# Patient Record
Sex: MALE | Race: WHITE | NOT HISPANIC OR LATINO | Employment: FULL TIME | URBAN - METROPOLITAN AREA
[De-identification: names, ages, dates, MRNs, and addresses within clinical notes are randomized per-mention and may not be internally consistent; named-entity substitution may affect disease eponyms.]

---

## 2017-01-09 ENCOUNTER — TELEPHONE (OUTPATIENT)
Dept: TRANSPLANT | Facility: CLINIC | Age: 22
End: 2017-01-09

## 2017-01-09 NOTE — TELEPHONE ENCOUNTER
Left voicemail for Hussain regarding his appointment scheduled tomorrow 1/10/17 with Dr. Pearson.  Just called to confirm he was able to make it to the appointment.  Asked him to give me a call back.

## 2017-01-25 DIAGNOSIS — Z94.0 KIDNEY REPLACED BY TRANSPLANT: ICD-10-CM

## 2017-01-25 PROCEDURE — 80158 DRUG ASSAY CYCLOSPORINE: CPT | Performed by: PEDIATRICS

## 2017-01-26 LAB
CYCLOSPORINE BLD LC/MS/MS-MCNC: 84 UG/L (ref 50–400)
TME LAST DOSE: NORMAL H

## 2017-02-24 DIAGNOSIS — Z94.0 KIDNEY REPLACED BY TRANSPLANT: ICD-10-CM

## 2017-02-24 PROCEDURE — 80158 DRUG ASSAY CYCLOSPORINE: CPT | Performed by: PEDIATRICS

## 2017-02-25 LAB
CYCLOSPORINE BLD LC/MS/MS-MCNC: 86 UG/L (ref 50–400)
TME LAST DOSE: NORMAL H

## 2017-03-08 ENCOUNTER — OFFICE VISIT (OUTPATIENT)
Dept: NEPHROLOGY | Facility: CLINIC | Age: 22
End: 2017-03-08
Attending: PEDIATRICS
Payer: COMMERCIAL

## 2017-03-08 VITALS
BODY MASS INDEX: 32.88 KG/M2 | DIASTOLIC BLOOD PRESSURE: 67 MMHG | HEIGHT: 74 IN | HEART RATE: 74 BPM | WEIGHT: 256.17 LBS | SYSTOLIC BLOOD PRESSURE: 136 MMHG | TEMPERATURE: 98.9 F

## 2017-03-08 DIAGNOSIS — D84.9 IMMUNOSUPPRESSION (H): ICD-10-CM

## 2017-03-08 DIAGNOSIS — Z94.0 KIDNEY TRANSPLANT STATUS: Primary | ICD-10-CM

## 2017-03-08 DIAGNOSIS — I15.1 HYPERTENSION SECONDARY TO OTHER RENAL DISORDERS: ICD-10-CM

## 2017-03-08 DIAGNOSIS — Z94.0 KIDNEY REPLACED BY TRANSPLANT: ICD-10-CM

## 2017-03-08 DIAGNOSIS — N18.30 CKD (CHRONIC KIDNEY DISEASE) STAGE 3, GFR 30-59 ML/MIN (H): ICD-10-CM

## 2017-03-08 PROCEDURE — 99212 OFFICE O/P EST SF 10 MIN: CPT | Mod: ZF

## 2017-03-08 PROCEDURE — 90651 9VHPV VACCINE 2/3 DOSE IM: CPT | Mod: ZF

## 2017-03-08 PROCEDURE — 25000128 H RX IP 250 OP 636: Mod: ZF

## 2017-03-08 PROCEDURE — 90472 IMMUNIZATION ADMIN EACH ADD: CPT

## 2017-03-08 PROCEDURE — 90471 IMMUNIZATION ADMIN: CPT

## 2017-03-08 PROCEDURE — 90732 PPSV23 VACC 2 YRS+ SUBQ/IM: CPT | Mod: ZF

## 2017-03-08 PROCEDURE — 90620 MENB-4C VACCINE IM: CPT | Mod: ZF

## 2017-03-08 PROCEDURE — 25000125 ZZHC RX 250: Mod: ZF

## 2017-03-08 PROCEDURE — G0009 ADMIN PNEUMOCOCCAL VACCINE: HCPCS

## 2017-03-08 RX ORDER — MYCOPHENOLATE MOFETIL 250 MG/1
1000 CAPSULE ORAL EVERY 12 HOURS
Qty: 240 CAPSULE | Refills: 6 | Status: SHIPPED | OUTPATIENT
Start: 2017-03-08 | End: 2017-10-31

## 2017-03-08 ASSESSMENT — PAIN SCALES - GENERAL: PAINLEVEL: NO PAIN (0)

## 2017-03-08 NOTE — PROGRESS NOTES
Return Visit for kidney transplant, hypertension, CKD3, immunosuppression    Chief Complaint:  Chief Complaint   Patient presents with     RECHECK     kidney txp follow up        HPI:    I had the pleasure of seeing Hussain Steven in the Pediatric Nephrology Clinic today for follow-up of kidney transplant, hypertension, CKD3, immunosuppression. Hussain is a 21 year old male who was transplanted in 2009.    Hussain had no complaints today.  He has been well recently without fevers, severe headaches, changes in vision, sore throat, difficulty swallowing, persistent cough, difficulty breathing, chest pain, abdominal pain, vomiting, diarrhea, dysuria, extremity pain or swelling, rash or skin lesions.  He is taking his medications as prescribed.    Hussain will be graduating this spring from [employer].  He hopes to get a job in [employer] in the [place] area.    Review of Systems:  A comprehensive review of systems was performed and found to be negative other than noted in the HPI.    Allergies:  Hussain is allergic to lisinopril..    Active Medications:  Current Outpatient Prescriptions   Medication Sig Dispense Refill     mycophenolate (CELLCEPT - GENERIC EQUIVALENT) 250 MG capsule Take 4 capsules (1,000 mg) by mouth every 12 hours 240 capsule 6     losartan (COZAAR) 50 MG tablet Take 1 tablet (50 mg) by mouth daily 30 tablet 11     atenolol (TENORMIN) 50 MG tablet Take 1 tablet (50 mg) by mouth two times daily 60 tablet 11     sulfamethoxazole-trimethoprim (BACTRIM,SEPTRA) 400-80 MG per tablet Take 1 tablet by mouth twice a week Twice a week, decreased for low WBC 10 tablet 11     GENGRAF 25 MG PO CAPSULE Take 4 capsules (100 mg) by mouth every 12 hours Dose change, family aware.  Please profile. 240 capsule 11     [DISCONTINUED] mycophenolate (CELLCEPT - GENERIC EQUIVALENT) 250 MG capsule Take 4 capsules (1,000 mg) by mouth every 12 hours 240 capsule 11        Immunizations:  Immunization History   Administered Date(s) Administered     DTAP  (<7y) 1995, 1995, 01/09/1996, 10/08/1996, 07/13/2000     HIB 1995, 1995, 01/09/1996, 10/08/1996     Hepatitis A Vac Ped/Adol-2 Dose 01/19/2009, 09/20/2010     Hepatitis B 1995, 1995, 01/09/1996, 01/19/2009, 07/02/2009     Human Papilloma Virus 08/26/2013, 12/26/2013, 03/08/2017     IPV 07/13/2000     Influenza (H1N1) 10/26/2009     Influenza (IIV3) 09/28/2009, 09/20/2010, 10/01/2011, 10/10/2012, 10/15/2013     Influenza Vaccine IM 3yrs+ 4 Valent IIV4 10/14/2014, 10/13/2015, 10/12/2016     MMR 07/09/1996, 07/13/2000     Mantoux 01/07/2009     Meningococcal (Bexsero ) 03/08/2017     Meningococcal (Menactra ) 05/21/2007, 08/26/2013     OPV 1995, 1995, 01/09/1996     Pneumococcal (PCV 13) 12/26/2013     Pneumococcal 23 valent 01/19/2009, 03/08/2017     Tdap (Adacel,Boostrix) 05/21/2007     Tetramune (DtP/HIB) 1995, 1995, 01/09/1996, 10/08/1996     Varicella 07/11/1997, 05/21/2007        PMHx:  Past Medical History   Diagnosis Date     Congenital hydrocele      rt side     Hyperplasia of gingivae      from amlodipine s/p gingeval resection     Kidney transplant Feb 2009     Unspecified hypertensive kidney disease      VUR (vesicoureteric reflux)      in native kidney, removed 2/09         PSHx:    Past Surgical History   Procedure Laterality Date     Transplant kidney recipient living related  2/2009     Biopsy kidney  4/2009     Biopsy kidney  8/2011     Orchiopexy infant       L with hernia at 18 mo     Orthopedic surgery  8/2009     Genu Valgum plating, removed 4/2010       FHx:  Family History   Problem Relation Age of Onset     Hypertension Father      DIABETES Paternal Uncle      Type 2     DIABETES Paternal Grandmother      Type 2     Hypertension Maternal Grandmother      Hypertension Maternal Grandfather      Hypertension Paternal Grandmother      Hypertension Paternal Grandfather      CANCER Paternal Grandfather      Lung     Cancer - colorectal  "Paternal Grandfather      Lyphoma, also with IP     Breast Cancer Maternal Grandmother        SHx:  Social History   Substance Use Topics     Smoking status: Never Smoker     Smokeless tobacco: Not on file     Alcohol use Not on file     Social History     Social History Narrative       Physical Exam:    /67 (BP Location: Right arm, Patient Position: Chair, Cuff Size: Adult Large)  Pulse 74  Temp 98.9  F (37.2  C) (Oral)  Ht 6' 2.29\" (188.7 cm)  Wt 256 lb 2.8 oz (116.2 kg)  BMI 32.63 kg/m2  Exam:  Constitutional: healthy, alert and no distress  Head: Normocephalic. No masses, lesions, tenderness or abnormalities  Neck: Neck supple. No adenopathy. Thyroid symmetric, normal size,  EYE: no periorbital edema  ENT: ENT exam normal, no neck nodes or sinus tenderness  Cardiovascular: negative, PMI normal. No lifts, heaves, or thrills. RRR. No murmurs, clicks gallops or rub  Respiratory: negative, Percussion normal. Good diaphragmatic excursion. Lungs clear  Gastrointestinal: Abdomen soft, non-tender. BS normal. No masses, organomegaly  : Deferred  Musculoskeletal: extremities normal- no gross deformities noted, gait normal, normal muscle tone and no  ankle edema  Skin: no suspicious lesions or rashes  Neurologic: Gait normal.   Psychiatric: mentation appears normal and affect normal/bright  Hematologic/Lymphatic/Immunologic: normal ant/post cervical, axillary, supraclavicular and inguinal nodes    Labs and Imaging:  Results for orders placed or performed in visit on 03/01/17   TXP External Lab Result   Result Value Ref Range    Glucose (External) 95 65 - 99 mg/dL    Urea Nitrogen (External) 29 (H) 7 - 25 mg/dL    Creatinine (External) 1.77 (H) 0.60 - 1.35 mg/dL    GFR Estimated (External) 54 (L) >=60 ml/min/1.73 m2    GFR Est if Black (External) 62 >=60 ml/min/1.73 m2    BUN/Creatinine Ratio (External) 16 6 - 22    Sodium (External) 139 135 - 146 mmol/L    Potassium (External) 4.4 3.5 - 5.3 mmol/L    Chloride " (External) 107 98 - 110 mmol/L    CO2 (External) 23 20 - 31 mmol/L    Calcium (External) 10.4 (H) 8.6 - 10.3 mg/dL    Phosphorus (External) 4.0 2.5 - 4.5 mg/dL    Albumin (External) 4.5 3.6 - 5.1 g/dL    Magnesium (External) 1.9 1.5 - 2.5 mg/dL    WBC Count (External) 7.2 4.1 - 11.0 K/uL    RBC Count (External) 4.99 3.60 - 6.30 M/uL    Hemoglobin (External) 14.1 12.0 - 18.0 g/dL    Hematocrit (External) 41.5 35.0 - 54.0 %    MCV (External) 83 80 - 99 fL    MCH (External) 28.3 26.0 - 32.0 pg    MCHC (External) 34.0 31.0 - 36.0 g/dL    RDW (External) 12.1 11.5 - 14.5 %    Platelet Count (External) 312 140 - 440 K/uL    MPV (External) 6.8 0.0 - 40.0 FL    Narrative    Verified by Radha Amato on 03/01/2017.       I personally reviewed results of laboratory evaluation, imaging studies and past medical records that were available during this outpatient visit.      Assessment and Plan:      ICD-10-CM    1. Kidney transplant status Z94.0 HUMAN PAPILLOMAVIRUS VACCINE     PNEUMOCOCCAL VACCINE,ADULT,SQ OR IM (PPSV23)     MENINGOCOCCAL RP W/OMV VACCINE 2 DOSE IM (BEXSERO )   2. Renal transplant recipient Z94.0 mycophenolate (CELLCEPT - GENERIC EQUIVALENT) 250 MG capsule   3. Hypertension secondary to other renal disorders I15.1 mycophenolate (CELLCEPT - GENERIC EQUIVALENT) 250 MG capsule    N28.89    4. CKD (chronic kidney disease) stage 3, GFR 30-59 ml/min N18.3    5. Immunosuppression (H) D89.9        Hussain is 8 years post renal transplant with stable CKD3.  He has hypertension that is well controlled on his current regimen.     I made no medication changes today.  We gave Hussain his 3rd HPV, PPV23 and his first Bexsera.    Our plan is to transition Hussain to the adult transplant program, since he is 21 and responsibly managing his care.  We will help him identify a nephrologist in the [place] area to manage his hypertension and CKD, and a transplant physician at the [facility].  I will remain his transplant  physician until he has established care with my counterpart in the adult program.    Patient Education: During this visit I discussed in detail the patient s symptoms, physical exam and evaluation results findings, tentative diagnosis as well as the treatment plan (Including but not limited to possible side effects and complications related to the disease, treatment modalities and intervention(s). Family expressed understanding and consent. Family was receptive and ready to learn; no apparent learning barriers were identified.    Follow up: Return in about 6 months (around 9/8/2017) for With Transplant. Please return sooner should Hussain become symptomatic.          Sincerely,    Mando Pearson MD   Pediatric Nephrology    CC:   Patient Care Team:  Mary Giles NP as PCP - General  Lizabeth Ohara, RN as Registered Nurse (Pediatrics)  Mando Pearson MD as MD (Pediatrics)  INDIANA ANDREA MD    Copy to patient  Lindsey Steven [facility] [id number]

## 2017-03-08 NOTE — LETTER
3/8/2017      RE: Hussain Steven  [address] [address]       Return Visit for kidney transplant, hypertension, CKD3, immunosuppression    Chief Complaint:  Chief Complaint   Patient presents with     RECHECK     kidney txp follow up        HPI:    I had the pleasure of seeing Hussain Steven in the Pediatric Nephrology Clinic today for follow-up of kidney transplant, hypertension, CKD3, immunosuppression. Hussain is a 21 year old male who was transplanted in 2009.    Hussain had no complaints today.  He has been well recently without fevers, severe headaches, changes in vision, sore throat, difficulty swallowing, persistent cough, difficulty breathing, chest pain, abdominal pain, vomiting, diarrhea, dysuria, extremity pain or swelling, rash or skin lesions.  He is taking his medications as prescribed.    Hussain will be graduating this spring from [employer].  He hopes to get a job in [employer] in the [place] area.    Review of Systems:  A comprehensive review of systems was performed and found to be negative other than noted in the HPI.    Allergies:  Hussain is allergic to lisinopril..    Active Medications:  Current Outpatient Prescriptions   Medication Sig Dispense Refill     mycophenolate (CELLCEPT - GENERIC EQUIVALENT) 250 MG capsule Take 4 capsules (1,000 mg) by mouth every 12 hours 240 capsule 6     losartan (COZAAR) 50 MG tablet Take 1 tablet (50 mg) by mouth daily 30 tablet 11     atenolol (TENORMIN) 50 MG tablet Take 1 tablet (50 mg) by mouth two times daily 60 tablet 11     sulfamethoxazole-trimethoprim (BACTRIM,SEPTRA) 400-80 MG per tablet Take 1 tablet by mouth twice a week Twice a week, decreased for low WBC 10 tablet 11     GENGRAF 25 MG PO CAPSULE Take 4 capsules (100 mg) by mouth every 12 hours Dose change, family aware.  Please profile. 240 capsule 11     [DISCONTINUED] mycophenolate (CELLCEPT - GENERIC EQUIVALENT) 250 MG capsule Take 4 capsules (1,000 mg) by mouth every 12 hours 240 capsule 11         Immunizations:  Immunization History   Administered Date(s) Administered     DTAP (<7y) 1995, 1995, 01/09/1996, 10/08/1996, 07/13/2000     HIB 1995, 1995, 01/09/1996, 10/08/1996     Hepatitis A Vac Ped/Adol-2 Dose 01/19/2009, 09/20/2010     Hepatitis B 1995, 1995, 01/09/1996, 01/19/2009, 07/02/2009     Human Papilloma Virus 08/26/2013, 12/26/2013, 03/08/2017     IPV 07/13/2000     Influenza (H1N1) 10/26/2009     Influenza (IIV3) 09/28/2009, 09/20/2010, 10/01/2011, 10/10/2012, 10/15/2013     Influenza Vaccine IM 3yrs+ 4 Valent IIV4 10/14/2014, 10/13/2015, 10/12/2016     MMR 07/09/1996, 07/13/2000     Mantoux 01/07/2009     Meningococcal (Bexsero ) 03/08/2017     Meningococcal (Menactra ) 05/21/2007, 08/26/2013     OPV 1995, 1995, 01/09/1996     Pneumococcal (PCV 13) 12/26/2013     Pneumococcal 23 valent 01/19/2009, 03/08/2017     Tdap (Adacel,Boostrix) 05/21/2007     Tetramune (DtP/HIB) 1995, 1995, 01/09/1996, 10/08/1996     Varicella 07/11/1997, 05/21/2007        PMHx:  Past Medical History   Diagnosis Date     Congenital hydrocele      rt side     Hyperplasia of gingivae      from amlodipine s/p gingeval resection     Kidney transplant Feb 2009     Unspecified hypertensive kidney disease      VUR (vesicoureteric reflux)      in native kidney, removed 2/09         PSHx:    Past Surgical History   Procedure Laterality Date     Transplant kidney recipient living related  2/2009     Biopsy kidney  4/2009     Biopsy kidney  8/2011     Orchiopexy infant       L with hernia at 18 mo     Orthopedic surgery  8/2009     Genu Valgum plating, removed 4/2010       FHx:  Family History   Problem Relation Age of Onset     Hypertension Father      DIABETES Paternal Uncle      Type 2     DIABETES Paternal Grandmother      Type 2     Hypertension Maternal Grandmother      Hypertension Maternal Grandfather      Hypertension Paternal Grandmother      Hypertension Paternal  "Grandfather      CANCER Paternal Grandfather      Lung     Cancer - colorectal Paternal Grandfather      Lyphoma, also with IP     Breast Cancer Maternal Grandmother        SHx:  Social History   Substance Use Topics     Smoking status: Never Smoker     Smokeless tobacco: Not on file     Alcohol use Not on file     Social History     Social History Narrative       Physical Exam:    /67 (BP Location: Right arm, Patient Position: Chair, Cuff Size: Adult Large)  Pulse 74  Temp 98.9  F (37.2  C) (Oral)  Ht 6' 2.29\" (188.7 cm)  Wt 256 lb 2.8 oz (116.2 kg)  BMI 32.63 kg/m2  Exam:  Constitutional: healthy, alert and no distress  Head: Normocephalic. No masses, lesions, tenderness or abnormalities  Neck: Neck supple. No adenopathy. Thyroid symmetric, normal size,  EYE: no periorbital edema  ENT: ENT exam normal, no neck nodes or sinus tenderness  Cardiovascular: negative, PMI normal. No lifts, heaves, or thrills. RRR. No murmurs, clicks gallops or rub  Respiratory: negative, Percussion normal. Good diaphragmatic excursion. Lungs clear  Gastrointestinal: Abdomen soft, non-tender. BS normal. No masses, organomegaly  : Deferred  Musculoskeletal: extremities normal- no gross deformities noted, gait normal, normal muscle tone and no  ankle edema  Skin: no suspicious lesions or rashes  Neurologic: Gait normal.   Psychiatric: mentation appears normal and affect normal/bright  Hematologic/Lymphatic/Immunologic: normal ant/post cervical, axillary, supraclavicular and inguinal nodes    Labs and Imaging:  Results for orders placed or performed in visit on 03/01/17   TXP External Lab Result   Result Value Ref Range    Glucose (External) 95 65 - 99 mg/dL    Urea Nitrogen (External) 29 (H) 7 - 25 mg/dL    Creatinine (External) 1.77 (H) 0.60 - 1.35 mg/dL    GFR Estimated (External) 54 (L) >=60 ml/min/1.73 m2    GFR Est if Black (External) 62 >=60 ml/min/1.73 m2    BUN/Creatinine Ratio (External) 16 6 - 22    Sodium (External) 139 " 135 - 146 mmol/L    Potassium (External) 4.4 3.5 - 5.3 mmol/L    Chloride (External) 107 98 - 110 mmol/L    CO2 (External) 23 20 - 31 mmol/L    Calcium (External) 10.4 (H) 8.6 - 10.3 mg/dL    Phosphorus (External) 4.0 2.5 - 4.5 mg/dL    Albumin (External) 4.5 3.6 - 5.1 g/dL    Magnesium (External) 1.9 1.5 - 2.5 mg/dL    WBC Count (External) 7.2 4.1 - 11.0 K/uL    RBC Count (External) 4.99 3.60 - 6.30 M/uL    Hemoglobin (External) 14.1 12.0 - 18.0 g/dL    Hematocrit (External) 41.5 35.0 - 54.0 %    MCV (External) 83 80 - 99 fL    MCH (External) 28.3 26.0 - 32.0 pg    MCHC (External) 34.0 31.0 - 36.0 g/dL    RDW (External) 12.1 11.5 - 14.5 %    Platelet Count (External) 312 140 - 440 K/uL    MPV (External) 6.8 0.0 - 40.0 FL    Narrative    Verified by Radha Amato on 03/01/2017.       I personally reviewed results of laboratory evaluation, imaging studies and past medical records that were available during this outpatient visit.      Assessment and Plan:      ICD-10-CM    1. Kidney transplant status Z94.0 HUMAN PAPILLOMAVIRUS VACCINE     PNEUMOCOCCAL VACCINE,ADULT,SQ OR IM (PPSV23)     MENINGOCOCCAL RP W/OMV VACCINE 2 DOSE IM (BEXSERO )   2. Renal transplant recipient Z94.0 mycophenolate (CELLCEPT - GENERIC EQUIVALENT) 250 MG capsule   3. Hypertension secondary to other renal disorders I15.1 mycophenolate (CELLCEPT - GENERIC EQUIVALENT) 250 MG capsule    N28.89    4. CKD (chronic kidney disease) stage 3, GFR 30-59 ml/min N18.3    5. Immunosuppression (H) D89.9        Hussain is 8 years post renal transplant with stable CKD3.  He has hypertension that is well controlled on his current regimen.     I made no medication changes today.  We gave Hussain his 3rd HPV, PPV23 and his first Bexsera.    Our plan is to transition Hussain to the adult transplant program, since he is 21 and responsibly managing his care.  We will help him identify a nephrologist in the [place] area to manage his hypertension and CKD, and a transplant  physician at the [facility].  I will remain his transplant physician until he has established care with my counterpart in the adult program.    Patient Education: During this visit I discussed in detail the patient s symptoms, physical exam and evaluation results findings, tentative diagnosis as well as the treatment plan (Including but not limited to possible side effects and complications related to the disease, treatment modalities and intervention(s). Family expressed understanding and consent. Family was receptive and ready to learn; no apparent learning barriers were identified.    Follow up: Return in about 6 months (around 9/8/2017) for With Transplant. Please return sooner should Hussain become symptomatic.          Sincerely,    Mando Pearson MD   Pediatric Nephrology    CC:   Patient Care Team:  Mary Giles NP as PCP - General  Lizabeth Ohara, RN as Registered Nurse (Pediatrics)  INDIANA ANDREA MD    Copy to patient  Lindsey Steven   [address] [place] [id number]

## 2017-03-08 NOTE — PATIENT INSTRUCTIONS
Follow up with Primary care physician in [place]. Call Lizabeth with name and phone number if this is a physician you want to stay with, [phone].  Follow up with Transplant in 6 months.  Monthly Labs.

## 2017-03-08 NOTE — NURSING NOTE
"Chief Complaint   Patient presents with     RECHECK     kidney txp follow up        Initial /67 (BP Location: Right arm, Patient Position: Chair, Cuff Size: Adult Large)  Pulse 74  Temp 98.9  F (37.2  C) (Oral)  Ht 6' 2.29\" (188.7 cm)  Wt 256 lb 2.8 oz (116.2 kg)  BMI 32.63 kg/m2 Estimated body mass index is 32.63 kg/(m^2) as calculated from the following:    Height as of this encounter: 6' 2.29\" (188.7 cm).    Weight as of this encounter: 256 lb 2.8 oz (116.2 kg).  Medication Reconciliation: complete   Beatriz Do LPN      "

## 2017-03-08 NOTE — NURSING NOTE
Medications reviewed with Hussain.  Lab frequency discuss, Hussain expressed understanding of our recommendations.  Hussain Steven uses [facility] lab.  Orders are up to date.  Print out of current med list provided.  Hussain verbalized understanding of the clinic visit and plan of care.  Hussain verbalized understanding of upcoming tests and appointments.  Hussain is going to graduate from college in May. He is looking for a job in [place]. He is ready for transition.

## 2017-03-08 NOTE — LETTER
March 8, 2017        Hussain Steven  [address]  [place] [address]    Dear Hussain:    We have reviewed your immunization records.     We are recommending that you complete the following immunizations in the near future:    [x]  Bexero Mengicoccal B vaccine Due after April 8th, 2017. First dose given March 8, 2017.     Please note you SHOULD NOT receive any LIVE vaccines due to post transplant/immunocompromised status.  These vaccines include, but are not limited to MMR, Varicella and Flu Mist.    Please schedule an appointment with your primary care provider or talk to your transplant coordinator about getting immunizations updated at the [facility]    If you receive the immunizations at your primary care office, please have the updated record faxed to Cecy Chapman or Melody Kyle at [phone].    Sincerely,    Your Transplant Care Team    Shara Nation, LIZET Ohara, VERONICA Ryan, VERONICA Kyle, [facility]  Cecy Chapman, [facility]

## 2017-03-08 NOTE — MR AVS SNAPSHOT
After Visit Summary   3/8/2017    Hussain Steven    MRN: [id number]           Patient Information     Date Of Birth          1995        Visit Information        Provider Department      3/8/2017 1:00 PM Mando Pearson MD Peds Nephrology        Today's Diagnoses     Kidney transplant status    -  1    Renal transplant recipient        Hypertension secondary to other renal disorders          Care Instructions    Follow up with Primary care physician in [place]. Call Lizabeth with name and phone number if this is a physician you want to stay with, [phone].  Follow up with Transplant in 6 months.  Monthly Labs.        Follow-ups after your visit        Follow-up notes from your care team     Return in about 6 months (around 9/8/2017) for With Transplant.      Who to contact     Please call your clinic at [phone] to:    Ask questions about your health    Make or cancel appointments    Discuss your medicines    Learn about your test results    Speak to your doctor   If you have compliments or concerns about an experience at your clinic, or if you wish to file a complaint, please contact [facility] Patient Relations at [phone] or email us at [email or web address]         Additional Information About Your Visit        MyChart Information     [employer] gives you secure access to your electronic health record. If you see a primary care provider, you can also send messages to your care team and make appointments. If you have questions, please call your primary care clinic.  If you do not have a primary care provider, please call [phone] and they will assist you.      [employer] is an electronic gateway that provides easy, online access to your medical records. With [employer], you can request a clinic appointment, read your test results, renew a prescription or communicate with your care team.     To access your existing account, please contact your  "[facility] or call [phone] for assistance.        Care EveryWhere ID     This is your Care EveryWhere ID. This could be used by other organizations to access your [place] medical records  [id number]        Your Vitals Were     Pulse Temperature Height BMI (Body Mass Index)          74 98.9  F (37.2  C) (Oral) 6' 2.29\" (188.7 cm) 32.63 kg/m2         Blood Pressure from Last 3 Encounters:   03/08/17 136/67   07/12/16 135/57   04/03/16 122/65    Weight from Last 3 Encounters:   03/08/17 256 lb 2.8 oz (116.2 kg)   07/12/16 255 lb 15.3 oz (116.1 kg)   01/12/16 254 lb 3.1 oz (115.3 kg)              We Performed the Following     HUMAN PAPILLOMAVIRUS VACCINE     MENINGOCOCCAL RP W/OMV VACCINE 2 DOSE IM (BEXSERO )     PNEUMOCOCCAL VACCINE,ADULT,SQ OR IM (PPSV23)          Where to get your medicines      These medications were sent to [facility] [address] [place] [address] AT [facility] & [address] [address]     Phone:  [phone]     mycophenolate 250 MG capsule          Primary Care Provider Office Phone # Fax #    Mary Giles [phone]       [address] [address]        Thank you!     Thank you for choosing PEDS NEPHROLOGY  for your care. Our goal is always to provide you with excellent care. Hearing back from our patients is one way we can continue to improve our services. Please take a few minutes to complete the written survey that you may receive in the mail after your visit with us. Thank you!             Your Updated Medication List - Protect others around you: Learn how to safely use, store and throw away your medicines at www.disposemymeds.org.          This list is accurate as of: 3/8/17  1:56 PM.  Always use your most recent med list.                   Brand Name Dispense Instructions for use    atenolol 50 MG tablet    TENORMIN    60 tablet    Take 1 tablet (50 " mg) by mouth two times daily       cycloSPORINE modified capsule     240 capsule    Take 4 capsules (100 mg) by mouth every 12 hours Dose change, family aware.  Please profile.       losartan 50 MG tablet    COZAAR    30 tablet    Take 1 tablet (50 mg) by mouth daily       mycophenolate 250 MG capsule    CELLCEPT - GENERIC EQUIVALENT    240 capsule    Take 4 capsules (1,000 mg) by mouth every 12 hours       sulfamethoxazole-trimethoprim 400-80 MG per tablet    BACTRIM/SEPTRA    10 tablet    Take 1 tablet by mouth twice a week Twice a week, decreased for low WBC

## 2017-03-24 DIAGNOSIS — Z94.0 KIDNEY REPLACED BY TRANSPLANT: ICD-10-CM

## 2017-03-24 PROCEDURE — 80158 DRUG ASSAY CYCLOSPORINE: CPT | Performed by: PEDIATRICS

## 2017-03-27 LAB
CYCLOSPORINE BLD LC/MS/MS-MCNC: 78 UG/L (ref 50–400)
TME LAST DOSE: NORMAL H

## 2017-04-28 DIAGNOSIS — Z94.0 KIDNEY REPLACED BY TRANSPLANT: ICD-10-CM

## 2017-04-28 PROCEDURE — 80158 DRUG ASSAY CYCLOSPORINE: CPT | Performed by: PEDIATRICS

## 2017-04-30 LAB
CYCLOSPORINE BLD LC/MS/MS-MCNC: 88 UG/L (ref 50–400)
TME LAST DOSE: NORMAL H

## 2017-05-26 DIAGNOSIS — Z94.0 KIDNEY REPLACED BY TRANSPLANT: ICD-10-CM

## 2017-05-26 PROCEDURE — 80158 DRUG ASSAY CYCLOSPORINE: CPT | Performed by: PEDIATRICS

## 2017-05-31 ENCOUNTER — TELEPHONE (OUTPATIENT)
Dept: TRANSPLANT | Facility: CLINIC | Age: 22
End: 2017-05-31

## 2017-05-31 LAB
CYCLOSPORINE BLD LC/MS/MS-MCNC: 45 UG/L (ref 50–400)
TME LAST DOSE: ABNORMAL H

## 2017-05-31 NOTE — TELEPHONE ENCOUNTER
Called Hussain with cyclosporine dose adjustment due to lab result of 45.  Hussain confirm the doses were taken accurately but states that the labs were taken two hours late.  Hussain confirmed current dose is 100mg every 12 hours.  Informed him we will not make a dose adjustment at this time but would like to repeat a level next week.  Hussain verbalized understanding of this information and confirmed he uses the [facility] lab in [place].  A lab order will be faxed over for next week (June 5-9).

## 2017-06-06 DIAGNOSIS — Z94.0 KIDNEY REPLACED BY TRANSPLANT: ICD-10-CM

## 2017-06-06 PROCEDURE — 80158 DRUG ASSAY CYCLOSPORINE: CPT | Performed by: PEDIATRICS

## 2017-06-08 LAB
CYCLOSPORINE BLD LC/MS/MS-MCNC: 67 UG/L (ref 50–400)
TME LAST DOSE: NORMAL H

## 2017-07-11 DIAGNOSIS — Z94.0 KIDNEY REPLACED BY TRANSPLANT: ICD-10-CM

## 2017-07-11 PROCEDURE — 80158 DRUG ASSAY CYCLOSPORINE: CPT | Performed by: PEDIATRICS

## 2017-07-13 LAB
CYCLOSPORINE BLD LC/MS/MS-MCNC: 67 UG/L (ref 50–400)
TME LAST DOSE: NORMAL H

## 2017-07-25 DIAGNOSIS — I15.1 HYPERTENSION SECONDARY TO OTHER RENAL DISORDERS: ICD-10-CM

## 2017-07-25 DIAGNOSIS — Z94.0 KIDNEY REPLACED BY TRANSPLANT: ICD-10-CM

## 2017-07-25 RX ORDER — ATENOLOL 50 MG/1
50 TABLET ORAL
Qty: 60 TABLET | Refills: 11 | Status: SHIPPED | OUTPATIENT
Start: 2017-07-25 | End: 2017-07-26

## 2017-07-26 ENCOUNTER — TELEPHONE (OUTPATIENT)
Dept: TRANSPLANT | Facility: CLINIC | Age: 22
End: 2017-07-26

## 2017-07-26 DIAGNOSIS — Z94.0 KIDNEY REPLACED BY TRANSPLANT: ICD-10-CM

## 2017-07-26 DIAGNOSIS — I15.1 HYPERTENSION SECONDARY TO OTHER RENAL DISORDERS: ICD-10-CM

## 2017-07-26 RX ORDER — ATENOLOL 50 MG/1
50 TABLET ORAL
Qty: 60 TABLET | Refills: 11 | Status: SHIPPED | OUTPATIENT
Start: 2017-07-26 | End: 2017-07-26

## 2017-07-26 RX ORDER — METOPROLOL TARTRATE 100 MG
100 TABLET ORAL 2 TIMES DAILY
Qty: 60 TABLET | Refills: 1 | Status: SHIPPED | OUTPATIENT
Start: 2017-07-26

## 2017-07-26 RX ORDER — ATENOLOL 50 MG/1
50 TABLET ORAL
Qty: 60 TABLET | Refills: 11 | Status: SHIPPED | OUTPATIENT
Start: 2017-07-26 | End: 2017-07-28

## 2017-07-26 NOTE — TELEPHONE ENCOUNTER
Catracho Nixon,  Hope you re having a good summer. Just thinking about you and wondering what your plans are for transitioning to Adult care.  Are you staying in [place]?  If so I can connect you with a Nephrologist there and transition you to our Adult Transplant Providers.    Let me know when you get a chance.    Thanks,    Lizabeth Ohara, RN BSN CPN  Pediatric Liver and Kidney Post Transplant Coordinator     [facility]  Pediatric Solid Organ Transplant -[address]  [facility] [place] [address]  [email or web address]  Office: [phone]  Main Transplant Center [phone]  Pager: [phone]  Fax:

## 2017-07-28 DIAGNOSIS — I15.1 HYPERTENSION SECONDARY TO OTHER RENAL DISORDERS: ICD-10-CM

## 2017-07-28 DIAGNOSIS — Z94.0 KIDNEY REPLACED BY TRANSPLANT: ICD-10-CM

## 2017-08-01 ENCOUNTER — OFFICE VISIT (OUTPATIENT)
Dept: NEPHROLOGY | Facility: CLINIC | Age: 22
End: 2017-08-01
Attending: INTERNAL MEDICINE
Payer: COMMERCIAL

## 2017-08-01 ENCOUNTER — APPOINTMENT (OUTPATIENT)
Dept: TRANSPLANT | Facility: CLINIC | Age: 22
End: 2017-08-01
Attending: INTERNAL MEDICINE
Payer: COMMERCIAL

## 2017-08-01 VITALS
DIASTOLIC BLOOD PRESSURE: 69 MMHG | HEIGHT: 74 IN | TEMPERATURE: 98.4 F | OXYGEN SATURATION: 96 % | BODY MASS INDEX: 33.22 KG/M2 | HEART RATE: 69 BPM | WEIGHT: 258.9 LBS | SYSTOLIC BLOOD PRESSURE: 113 MMHG

## 2017-08-01 DIAGNOSIS — Z94.0 STATUS POST KIDNEY TRANSPLANT: Primary | ICD-10-CM

## 2017-08-01 DIAGNOSIS — N18.30 CKD (CHRONIC KIDNEY DISEASE) STAGE 3, GFR 30-59 ML/MIN (H): ICD-10-CM

## 2017-08-01 DIAGNOSIS — Z48.298 CARE AFTER ORGAN TRANSPLANT: ICD-10-CM

## 2017-08-01 DIAGNOSIS — I10 ESSENTIAL HYPERTENSION: ICD-10-CM

## 2017-08-01 DIAGNOSIS — D84.9 IMMUNOSUPPRESSION (H): ICD-10-CM

## 2017-08-01 DIAGNOSIS — Z94.0 KIDNEY REPLACED BY TRANSPLANT: Primary | ICD-10-CM

## 2017-08-01 PROCEDURE — 99212 OFFICE O/P EST SF 10 MIN: CPT | Mod: ZF

## 2017-08-01 RX ORDER — SERTRALINE HYDROCHLORIDE 100 MG/1
100 TABLET, FILM COATED ORAL DAILY
COMMUNITY
Start: 2017-05-12 | End: 2022-02-07

## 2017-08-01 ASSESSMENT — PAIN SCALES - GENERAL: PAINLEVEL: NO PAIN (0)

## 2017-08-01 NOTE — MR AVS SNAPSHOT
After Visit Summary   8/1/2017    Hussain Steven    MRN: [id number]           Patient Information     Date Of Birth          1995        Visit Information        Provider Department      8/1/2017 3:00 PM Tash Segundo MSW [facility] Solid Organ Transplant        Today's Diagnoses     Renal transplant recipient    -  1       Follow-ups after your visit        Who to contact     If you have questions or need follow up information about today's clinic visit or your schedule please contact [facility] SOLID ORGAN TRANSPLANT directly at [phone].  Normal or non-critical lab and imaging results will be communicated to you by [employer], letter or phone within 4 business days after the clinic has received the results. If you do not hear from us within 7 days, please contact the clinic through [employer] or phone. If you have a critical or abnormal lab result, we will notify you by phone as soon as possible.  Submit refill requests through [employer] or call your pharmacy and they will forward the refill request to us. Please allow 3 business days for your refill to be completed.          Additional Information About Your Visit        MyChart Information     [employer] gives you secure access to your electronic health record. If you see a primary care provider, you can also send messages to your care team and make appointments. If you have questions, please call your primary care clinic.  If you do not have a primary care provider, please call [phone] and they will assist you.        Care EveryWhere ID     This is your Care EveryWhere ID. This could be used by other organizations to access your [place] medical records  [id number]         Blood Pressure from Last 3 Encounters:   08/01/17 113/69   03/08/17 136/67   07/12/16 135/57    Weight from Last 3 Encounters:   08/01/17 117.4 kg (258 lb 14.4 oz)   03/08/17 116.2 kg (256 lb 2.8 oz)   07/12/16 116.1 kg (255 lb 15.3 oz)              Today, you had  the following     No orders found for display       Primary Care Provider Office Phone # Fax #    Mary Giles, SHIRLEY [phone]       [facility] [address] [facility] [address]        Equal Access to Services     ASHLEE KEVIN : Hadii aad ku hadsaraho Sojaiali, waaxda luqadaha, qaybta kaalmada adecristianada, eros joshua lindajesus rosedelmisjessi loya. So [facility] [phone].    ATENCIÓN: Si habla español, tiene a kaufman disposición servicios gratuitos de asistencia lingüística. Llame al [phone].    We comply with applicable federal civil rights laws and [place] laws. We do not discriminate on the basis of race, color, national origin, age, disability sex, sexual orientation or gender identity.            Thank you!     Thank you for choosing [facility] SOLID ORGAN TRANSPLANT  for your care. Our goal is always to provide you with excellent care. Hearing back from our patients is one way we can continue to improve our services. Please take a few minutes to complete the written survey that you may receive in the mail after your visit with us. Thank you!             Your Updated Medication List - Protect others around you: Learn how to safely use, store and throw away your medicines at www.disposemymeds.org.          This list is accurate as of: 8/1/17  3:49 PM.  Always use your most recent med list.                   Brand Name Dispense Instructions for use Diagnosis    cycloSPORINE modified capsule     240 capsule    Take 4 capsules (100 mg) by mouth every 12 hours Dose change, family aware.  Please profile.    Kidney replaced by transplant       losartan 50 MG tablet    COZAAR    30 tablet    Take 1 tablet (50 mg) by mouth daily    Kidney replaced by transplant, Hypertension secondary to other renal disorders       metoprolol 100 MG tablet    LOPRESSOR    60 tablet    Take 1 tablet (100 mg) by mouth 2 times daily    Hypertension secondary to other renal disorders, Kidney replaced by transplant        mycophenolate 250 MG capsule    CELLCEPT - GENERIC EQUIVALENT    240 capsule    Take 4 capsules (1,000 mg) by mouth every 12 hours    Kidney replaced by transplant, Hypertension secondary to other renal disorders       sertraline 100 MG tablet    ZOLOFT     Take 100 mg by mouth daily        sulfamethoxazole-trimethoprim 400-80 MG per tablet    BACTRIM/SEPTRA    10 tablet    Take 1 tablet by mouth twice a week Twice a week, decreased for low WBC    Kidney replaced by transplant, Hypertension secondary to other renal disorders

## 2017-08-01 NOTE — NURSING NOTE
"Chief Complaint   Patient presents with     RECHECK     Follow up CKD stage 2.       Initial /69  Pulse 69  Temp 98.4  F (36.9  C) (Oral)  Ht 1.887 m (6' 2.29\")  Wt 117.4 kg (258 lb 14.4 oz)  SpO2 96%  BMI 32.98 kg/m2 Estimated body mass index is 32.98 kg/(m^2) as calculated from the following:    Height as of this encounter: 1.887 m (6' 2.29\").    Weight as of this encounter: 117.4 kg (258 lb 14.4 oz).  Medication Reconciliation: complete   Elsie Lagunas., CMA    "

## 2017-08-01 NOTE — PROGRESS NOTES
Assessment and Plan:  1. LDKT with CKD III - Mr Steven underwent a LR kid tx in 2009. His baseline creatinine is 1.7 mg / dl. His current immunosuppression is with cyclosporine 100 mg po bid, mmf 1000 mg po bid. No BK or DSA on last checks.   2. Immunosuppression: goal cyclo is . Continue his cellcept.   3. CKD III: stable kidney function. bp at goal. Follow proteinuria every 12 months. On losartan currently.   4. HTN: at goal on losartan / metoprolol.   5. 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   6. No DSA in 2016.     Assessment and plan was discussed with patient and he voiced his understanding and agreement.    Reason for Visit:  Mr. Steven is here for routine follow up.    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         Transplant Hx:       Tx: LDKT  Date: 2/3/2009       Present Maintenance IS: Cyclosporine and Mycophenolate mofetil       Baseline Creatinine: 1.7 mg / dl       Recent DSA: No         Biopsy: Yes: 2011 - negative for rejection    Mr. Steven is transferring from his pediatric nephrologist to us for adult care. He developed ESRD necessitating LR kid tx in 2009. HIs baseline creatinine is about 1.7 mg / dl. Biopsy in 2011 was negative for any rejection.     His immunosuppression is with cyclosporine / mmf.     His bp is currently good. On losartan / metoprolol.     Home BP: at goal.    No cp, sob, no n/v, no constipation or diarrhea, no f/s/c.       ROS:   A comprehensive review of systems was obtained and negative, except as noted in the HPI or PMH.    Active Medical Problems:  Patient Active Problem List   Diagnosis     Hypertensive kidney disease     Hydrocele, right     Renal transplant recipient     CKD (chronic kidney disease) stage 2     Personal Hx:  Social History     Social History     Marital status: Single     Spouse name: N/A     Number of children: N/A     Years of education: N/A  "    Occupational History     Not on file.     Social History Main Topics     Smoking status: Never Smoker     Smokeless tobacco: Not on file     Alcohol use Not on file     Drug use: Not on file     Sexual activity: Not on file     Other Topics Concern     Not on file     Social History Narrative     Allergies:  Allergies   Allergen Reactions     Lisinopril Other (See Comments)     headache     Medications:  Prior to Admission medications    Medication Sig Start Date End Date Taking? Authorizing Provider   metoprolol (LOPRESSOR) 100 MG tablet Take 1 tablet (100 mg) by mouth 2 times daily 7/26/17   Mando Pearson MD   mycophenolate (CELLCEPT - GENERIC EQUIVALENT) 250 MG capsule Take 4 capsules (1,000 mg) by mouth every 12 hours 3/8/17   Mando Pearson MD   losartan (COZAAR) 50 MG tablet Take 1 tablet (50 mg) by mouth daily 9/23/16   Mando Pearson MD   sulfamethoxazole-trimethoprim (BACTRIM,SEPTRA) 400-80 MG per tablet Take 1 tablet by mouth twice a week Twice a week, decreased for low WBC 9/26/16   Mando Pearson MD   GENGRAF 25 MG PO CAPSULE Take 4 capsules (100 mg) by mouth every 12 hours Dose change, family aware.  Please profile. 9/23/16   Mando Pearson MD       Vitals:  /69  Pulse 69  Temp 98.4  F (36.9  C) (Oral)  Ht 1.887 m (6' 2.29\")  Wt 117.4 kg (258 lb 14.4 oz)  SpO2 96%  BMI 32.98 kg/m2    Exam:   GENERAL APPEARANCE: alert and no distress  HENT: mouth without ulcers or lesions  LYMPHATICS: no cervical or supraclavicular nodes  RESP: lungs clear to auscultation - no rales, rhonchi or wheezes  CV: regular rhythm, normal rate, no rub, no murmur  EDEMA: no LE edema bilaterally  ABDOMEN: soft, nondistended, nontender, bowel sounds normal  MS: extremities normal - no gross deformities noted, no evidence of inflammation in joints, no muscle tenderness  SKIN: no rash    Results:   Results were reviewed with the patient today.     "

## 2017-08-01 NOTE — PATIENT INSTRUCTIONS
Continue your current medications.    Goal blood pressure is 100-130 / 60-80.    Get your flu shot every year in October / November.    Continue with monthly labs.    Goal cyclosporine is around 75.    Call with any questions.    Follow up yearly. Sooner if any problems. Let us know where you would like a dermatology referral or we can schedule it with us for next year on the same day as your kidney transplant visit.

## 2017-08-01 NOTE — MR AVS SNAPSHOT
After Visit Summary   8/1/2017    Hussain Steven    MRN: [id number]           Patient Information     Date Of Birth          1995        Visit Information        Provider Department      8/1/2017 1:55 PM Sancho Marshall MD [facility] Nephrology        Care Instructions    Continue your current medications.    Goal blood pressure is 100-130 / 60-80.    Get your flu shot every year in October / November.    Continue with monthly labs.    Goal cyclosporine is around 70.    Call with any questions.    Follow up yearly. Sooner if any problems. Let us know where you would like a dermatology referral or we can schedule it with us for next year on the same day as your kidney transplant visit.           Follow-ups after your visit        Follow-up notes from your care team     Return in about 1 year (around 8/1/2018).      Your next 10 appointments already scheduled     Aug 01, 2017  3:00 PM CDT   (Arrive by 2:45 PM)   SOT SOCIAL WORK EVAL with BÁRBARA Hart   [facility] Solid Organ Transplant ([facility])    [address] [place] [id number] [phone]              Who to contact     If you have questions or need follow up information about today's clinic visit or your schedule please contact [facility] NEPHROLOGY directly at [phone].  Normal or non-critical lab and imaging results will be communicated to you by MyChart, letter or phone within 4 business days after the clinic has received the results. If you do not hear from us within 7 days, please contact the clinic through MyChart or phone. If you have a critical or abnormal lab result, we will notify you by phone as soon as possible.  Submit refill requests through [employer] or call your pharmacy and they will forward the refill request to us. Please allow 3 business days for your refill to be completed.          Additional Information About Your Visit        MyChart Information   "   [employer] gives you secure access to your electronic health record. If you see a primary care provider, you can also send messages to your care team and make appointments. If you have questions, please call your primary care clinic.  If you do not have a primary care provider, please call [phone] and they will assist you.        Care EveryWhere ID     This is your Care EveryWhere ID. This could be used by other organizations to access your [place] medical records  [id number]        Your Vitals Were     Pulse Temperature Height Pulse Oximetry BMI (Body Mass Index)       69 98.4  F (36.9  C) (Oral) 1.887 m (6' 2.29\") 96% 32.98 kg/m2        Blood Pressure from Last 3 Encounters:   08/01/17 113/69   03/08/17 136/67   07/12/16 135/57    Weight from Last 3 Encounters:   08/01/17 117.4 kg (258 lb 14.4 oz)   03/08/17 116.2 kg (256 lb 2.8 oz)   07/12/16 116.1 kg (255 lb 15.3 oz)              Today, you had the following     No orders found for display       Primary Care Provider Office Phone # Fax #    Mary GLADYS Giles [phone]       [address] [address]        Equal Access to Services     ASHLEE KEVIN : Hadii violet walton hadasho Soomaali, waaxda luqadaha, qaybta kaalmada adeegyada, eros niño . So [facility] [phone].    ATENCIÓN: Si habla español, tiene a kaufman disposición servicios gratuitos de asistencia lingüística. Llame al [phone].    We comply with applicable federal civil rights laws and [place] laws. We do not discriminate on the basis of race, color, national origin, age, disability sex, sexual orientation or gender identity.            Thank you!     Thank you for choosing [facility] NEPHROLOGY  for your care. Our goal is always to provide you with excellent care. Hearing back from our patients is one way we can continue to improve our services. Please take a few minutes to complete the written survey that you may receive " in the mail after your visit with us. Thank you!             Your Updated Medication List - Protect others around you: Learn how to safely use, store and throw away your medicines at www.disposemymeds.org.          This list is accurate as of: 8/1/17  2:37 PM.  Always use your most recent med list.                   Brand Name Dispense Instructions for use Diagnosis    cycloSPORINE modified capsule     240 capsule    Take 4 capsules (100 mg) by mouth every 12 hours Dose change, family aware.  Please profile.    Kidney replaced by transplant       losartan 50 MG tablet    COZAAR    30 tablet    Take 1 tablet (50 mg) by mouth daily    Kidney replaced by transplant, Hypertension secondary to other renal disorders       metoprolol 100 MG tablet    LOPRESSOR    60 tablet    Take 1 tablet (100 mg) by mouth 2 times daily    Hypertension secondary to other renal disorders, Kidney replaced by transplant       mycophenolate 250 MG capsule    CELLCEPT - GENERIC EQUIVALENT    240 capsule    Take 4 capsules (1,000 mg) by mouth every 12 hours    Kidney replaced by transplant, Hypertension secondary to other renal disorders       sertraline 100 MG tablet    ZOLOFT     Take 100 mg by mouth daily        sulfamethoxazole-trimethoprim 400-80 MG per tablet    BACTRIM/SEPTRA    10 tablet    Take 1 tablet by mouth twice a week Twice a week, decreased for low WBC    Kidney replaced by transplant, Hypertension secondary to other renal disorders

## 2017-08-01 NOTE — LETTER
8/1/2017      RE: Hussain Steven  [address]  [place] [address]       Assessment and Plan:  1. LDKT with CKD III - Mr Steven underwent a LR kid tx in 2009. His baseline creatinine is 1.7 mg / dl. His current immunosuppression is with cyclosporine 100 mg po bid, mmf 1000 mg po bid. No BK or DSA on last checks.   2. Immunosuppression: goal cyclo is . Continue his cellcept.   3. CKD III: stable kidney function. bp at goal. Follow proteinuria every 12 months. On losartan currently.   4. HTN: at goal on losartan / metoprolol.   5. 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   6. No DSA in 2016.     Assessment and plan was discussed with patient and he voiced his understanding and agreement.    Reason for Visit:  Mr. Steven is here for routine follow up.    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         Transplant Hx:       Tx: LDKT  Date: 2/3/2009       Present Maintenance IS: Cyclosporine and Mycophenolate mofetil       Baseline Creatinine: 1.7 mg / dl       Recent DSA: No         Biopsy: Yes: 2011 - negative for rejection    Mr. Steven is transferring from his pediatric nephrologist to us for adult care. He developed ESRD necessitating LR kid tx in 2009. HIs baseline creatinine is about 1.7 mg / dl. Biopsy in 2011 was negative for any rejection.     His immunosuppression is with cyclosporine / mmf.     His bp is currently good. On losartan / metoprolol.     Home BP: at goal.    No cp, sob, no n/v, no constipation or diarrhea, no f/s/c.       ROS:   A comprehensive review of systems was obtained and negative, except as noted in the HPI or PMH.    Active Medical Problems:  Patient Active Problem List   Diagnosis     Hypertensive kidney disease     Hydrocele, right     Renal transplant recipient     CKD (chronic kidney disease) stage 2     Personal Hx:  Social History     Social History     Marital status: Single      "Spouse name: N/A     Number of children: N/A     Years of education: N/A     Occupational History     Not on file.     Social History Main Topics     Smoking status: Never Smoker     Smokeless tobacco: Not on file     Alcohol use Not on file     Drug use: Not on file     Sexual activity: Not on file     Other Topics Concern     Not on file     Social History Narrative     Allergies:  Allergies   Allergen Reactions     Lisinopril Other (See Comments)     headache     Medications:  Prior to Admission medications    Medication Sig Start Date End Date Taking? Authorizing Provider   metoprolol (LOPRESSOR) 100 MG tablet Take 1 tablet (100 mg) by mouth 2 times daily 7/26/17   Mando Pearson MD   mycophenolate (CELLCEPT - GENERIC EQUIVALENT) 250 MG capsule Take 4 capsules (1,000 mg) by mouth every 12 hours 3/8/17   Mando Pearson MD   losartan (COZAAR) 50 MG tablet Take 1 tablet (50 mg) by mouth daily 9/23/16   Mando Pearson MD   sulfamethoxazole-trimethoprim (BACTRIM,SEPTRA) 400-80 MG per tablet Take 1 tablet by mouth twice a week Twice a week, decreased for low WBC 9/26/16   Mando Pearson MD   GENGRAF 25 MG PO CAPSULE Take 4 capsules (100 mg) by mouth every 12 hours Dose change, family aware.  Please profile. 9/23/16   Mando Pearson MD       Vitals:  /69  Pulse 69  Temp 98.4  F (36.9  C) (Oral)  Ht 1.887 m (6' 2.29\")  Wt 117.4 kg (258 lb 14.4 oz)  SpO2 96%  BMI 32.98 kg/m2    Exam:   GENERAL APPEARANCE: alert and no distress  HENT: mouth without ulcers or lesions  LYMPHATICS: no cervical or supraclavicular nodes  RESP: lungs clear to auscultation - no rales, rhonchi or wheezes  CV: regular rhythm, normal rate, no rub, no murmur  EDEMA: no LE edema bilaterally  ABDOMEN: soft, nondistended, nontender, bowel sounds normal  MS: extremities normal - no gross deformities noted, no evidence of inflammation in joints, no muscle tenderness  SKIN: no rash    Results:   Results were " reviewed with the patient today.       Sancho Marshall MD

## 2017-08-01 NOTE — PROGRESS NOTES
Psychosocial Assessment- Peds to Adult Transition  Patient Name/ Age: Hussain Steven 22 year old   Medical Record #: [id number]  Duration of Interview: 30 min  Process:   Face-to-Face Interview                (counseling < 50%)   Present at Appointment: Hussain        : BRITTANY Ramos Date:  August 1, 2017        Prior Transplants:   Kidney 2/3/2009 Status of Transplant: Functioning well       Current Living Situation    Location:   [address] [place] [id number]  With Whom: lives with their family       Family/ Social Support:    Hussain reported he does not have any children. Hussain has one older brother. Hussain lives with his parents.  available, helpful   Committed relationship:     other:  Single   Other supports:    available, helpful       Activities/ Functional Ability    Current level: ambulatory, visually impaired and independent with ADL's     Transportation drives self       Vocational/Employment/Financial     Employment   Unemployed/student   Job Description   Hussain recently graduated from [employer] with a degree in [employer]. Hussain reported he is currently looking for employment here in the [place].      Income   other: Parents assist   Insurance      At this time, patient can afford medication costs:  Yes  private insurance- BCBS through parents       Medical Status    Current mode of treatment for ESRD kidney transplant   Complications none       Behavioral    Tobacco Use No Chemical Dependency No    Hussain reported he has a couple alcoholic drinks a month.      Psychiatric Impairment Yes   Hussain reported he is diagnosed with anxiety and depression. Hussain reported he is prescribed medication by his primary care provider. Hussain reported he feels his mental health is well managed.     Reading ability Good  Education level: Bachelor's Degree Recent Legal History No      Coping Style/Strategies: Take a break, change thought       Ability to Adhere to Complex Medical Regime: Yes     Adherence History:  Hussain reported he attends his appointments, follows his physician's recommendations, and takes his medications as prescribed.         Education  _X_ Medicare  _X_ Rehabilitation  _X_ Donor issues  _X_ Community resources  _X_ Post discharge housing  _X_ Financial resources  _X_ Medical insurance options  _X_ Psych adjustment  _X_ Family adjustment  _X_ Health Care Directive No   Psychosocial Risks of Transplant Reviewed and Discussed:  _X_ Increased stress related to emotional,            family, social, employment or financial           situation  _X_ Affect on work and/or disability benefits  _X_ Affect on future health and life           insurance  _X_ Transplant outcome expectations may           not be met  _X_ Mental Health Risks: anxiety,           depression, PTSD, guilt, grief and           chronic fatigue     Notable Items:   None noted.       Final Evaluation/Assessment   Patient seemed to process information well. Appeared well informed, motivated and able to follow post transplant requirements. Behavior was appropriate during interview. Has adequate income and insurance coverage. Adequate social support.       Signature: BRITTANY Ramos    Title: Clinical

## 2017-08-02 ENCOUNTER — TELEPHONE (OUTPATIENT)
Dept: TRANSPLANT | Facility: CLINIC | Age: 22
End: 2017-08-02

## 2017-08-04 NOTE — TELEPHONE ENCOUNTER
Kidney Transplant Coordinator Pediatric to Adult Transition Note    Pt Name: Hussain Steven  : 1995    Nephrologist: Uday Marshall  Transplant Coordinator: Mason Nova      Patient Active Problem List   Diagnosis     Hypertensive kidney disease     Hydrocele, right     Renal transplant recipient     CKD (chronic kidney disease) stage 2       Education:  Writer met with Hussain Steven. We discussed his past transplant experience, his course has been very successful. He feels he has a firm understanding of his medications and labs. He would like to maintain an every 2 month lab draw schedule for now and move to Q 3 months in the future if successful. He is currently looking for a job as he recently graduated from [employer], he hopes to be located closer to [place]. We further discussed immunosuppression medications, dose adjustments, and lab monitoring. Lab draw schedule was given to pt and fully explained; no questions. Further education was provided on adult transplant expectations, when we will call and when he should call coordinator, and office contact numbers.      Pt questions for follow up: None      Letty Lee RN  Post-Kidney Transplant Coordinator  (ph) [phone]  (pg) [phone]

## 2017-08-18 DIAGNOSIS — Z94.0 KIDNEY REPLACED BY TRANSPLANT: ICD-10-CM

## 2017-08-18 PROCEDURE — 80158 DRUG ASSAY CYCLOSPORINE: CPT | Performed by: PEDIATRICS

## 2017-08-24 LAB
CYCLOSPORINE BLD LC/MS/MS-MCNC: 60 UG/L (ref 50–400)
TME LAST DOSE: NORMAL H

## 2017-08-25 ENCOUNTER — TELEPHONE (OUTPATIENT)
Dept: TRANSPLANT | Facility: CLINIC | Age: 22
End: 2017-08-25

## 2017-08-25 NOTE — TELEPHONE ENCOUNTER
Received message from Barbara MARQUEZ, transplant coordinator of the following information:  Can you call Hussain and ask him if he has been taking 100 mg of CSA BID and at the correct times? If he has will you tell him to increase to 125 mg in the AM and remain at 100 mg in the PM. He should then recheck a level in 3-5 days.    Left voicemail for Hussain to call regarding low CSA level of 60.  This writer informed him we would like to confirm the current doses and the times that he takes his medications.  Requested Hussain to call us back to discuss.

## 2017-08-25 NOTE — TELEPHONE ENCOUNTER
Received a call back from Hussain.  He confirmed his current dose is 100mg every 12 hours and he takes his doses at 8am and 8pm and denies any missed doses.  Informed him to increase to 125mg in the morning and 100mg in the evening and repeat next week.  Hussain verbalized understanding and confirmed we should send the repeat order to [facility].  Hussain proceeded to let us know that he was recently seen with the adult team. After looking into this and discussing with the pediatric transplant coordinator, it looks like the transplant coordinator was not updated in Epic and the lab result came to Lizabeth Ohara.  The writer informed Hussain to proceed with the dose adjustment given to him and we will update this information in [facility] and let the adult transplant coordinator know what happened. In the future the lab results should go to the adult coordinator.

## 2017-08-28 DIAGNOSIS — Z94.0 KIDNEY REPLACED BY TRANSPLANT: ICD-10-CM

## 2017-08-28 RX ORDER — CYCLOSPORINE 25 MG/1
CAPSULE ORAL
Qty: 240 CAPSULE | Refills: 11 | Status: SHIPPED | OUTPATIENT
Start: 2017-08-28 | End: 2018-08-09

## 2017-10-04 DIAGNOSIS — I15.1 HYPERTENSION SECONDARY TO OTHER RENAL DISORDERS: ICD-10-CM

## 2017-10-04 DIAGNOSIS — Z94.0 KIDNEY REPLACED BY TRANSPLANT: ICD-10-CM

## 2017-10-04 RX ORDER — SULFAMETHOXAZOLE AND TRIMETHOPRIM 400; 80 MG/1; MG/1
1 TABLET ORAL
Qty: 10 TABLET | Refills: 11 | Status: SHIPPED | OUTPATIENT
Start: 2017-10-05 | End: 2018-08-09

## 2017-10-23 PROCEDURE — 80158 DRUG ASSAY CYCLOSPORINE: CPT | Performed by: PEDIATRICS

## 2017-10-26 LAB
CYCLOSPORINE BLD LC/MS/MS-MCNC: 49 UG/L (ref 50–400)
TME LAST DOSE: ABNORMAL H

## 2017-10-31 DIAGNOSIS — Z94.0 KIDNEY REPLACED BY TRANSPLANT: ICD-10-CM

## 2017-10-31 DIAGNOSIS — I15.1 HYPERTENSION SECONDARY TO OTHER RENAL DISORDERS: ICD-10-CM

## 2017-10-31 RX ORDER — MYCOPHENOLATE MOFETIL 250 MG/1
1000 CAPSULE ORAL EVERY 12 HOURS
Qty: 240 CAPSULE | Refills: 6 | Status: SHIPPED | OUTPATIENT
Start: 2017-10-31 | End: 2018-06-15

## 2018-01-02 DIAGNOSIS — Z94.0 KIDNEY REPLACED BY TRANSPLANT: ICD-10-CM

## 2018-01-02 PROCEDURE — 80158 DRUG ASSAY CYCLOSPORINE: CPT | Performed by: PEDIATRICS

## 2018-01-07 LAB
CYCLOSPORINE BLD LC/MS/MS-MCNC: 85 UG/L (ref 50–400)
TME LAST DOSE: NORMAL H

## 2018-02-19 PROCEDURE — 80158 DRUG ASSAY CYCLOSPORINE: CPT | Performed by: PEDIATRICS

## 2018-02-21 DIAGNOSIS — Z94.0 KIDNEY REPLACED BY TRANSPLANT: Primary | ICD-10-CM

## 2018-02-21 DIAGNOSIS — Z79.899 ENCOUNTER FOR LONG-TERM CURRENT USE OF MEDICATION: ICD-10-CM

## 2018-02-21 DIAGNOSIS — Z48.298 AFTERCARE FOLLOWING ORGAN TRANSPLANT: ICD-10-CM

## 2018-02-21 LAB
CYCLOSPORINE BLD LC/MS/MS-MCNC: 91 UG/L (ref 50–400)
TME LAST DOSE: NORMAL H

## 2018-05-04 DIAGNOSIS — Z48.298 AFTERCARE FOLLOWING ORGAN TRANSPLANT: ICD-10-CM

## 2018-05-04 DIAGNOSIS — Z79.899 ENCOUNTER FOR LONG-TERM CURRENT USE OF MEDICATION: ICD-10-CM

## 2018-05-04 DIAGNOSIS — Z94.0 KIDNEY REPLACED BY TRANSPLANT: ICD-10-CM

## 2018-05-04 PROCEDURE — 80158 DRUG ASSAY CYCLOSPORINE: CPT | Performed by: INTERNAL MEDICINE

## 2018-05-08 LAB
CYCLOSPORINE BLD LC/MS/MS-MCNC: 67 UG/L (ref 50–400)
TME LAST DOSE: NORMAL H

## 2018-06-15 DIAGNOSIS — Z94.0 KIDNEY REPLACED BY TRANSPLANT: ICD-10-CM

## 2018-06-15 DIAGNOSIS — I15.1 HYPERTENSION SECONDARY TO OTHER RENAL DISORDERS: ICD-10-CM

## 2018-06-15 RX ORDER — MYCOPHENOLATE MOFETIL 250 MG/1
1000 CAPSULE ORAL EVERY 12 HOURS
Qty: 240 CAPSULE | Refills: 11 | Status: SHIPPED | OUTPATIENT
Start: 2018-06-15 | End: 2018-08-09

## 2018-06-29 DIAGNOSIS — Z94.0 KIDNEY REPLACED BY TRANSPLANT: ICD-10-CM

## 2018-06-29 DIAGNOSIS — Z79.899 ENCOUNTER FOR LONG-TERM CURRENT USE OF MEDICATION: ICD-10-CM

## 2018-06-29 DIAGNOSIS — Z48.298 AFTERCARE FOLLOWING ORGAN TRANSPLANT: ICD-10-CM

## 2018-06-29 PROCEDURE — 80158 DRUG ASSAY CYCLOSPORINE: CPT | Performed by: INTERNAL MEDICINE

## 2018-07-02 LAB
CYCLOSPORINE BLD LC/MS/MS-MCNC: 67 UG/L (ref 50–400)
TME LAST DOSE: NORMAL H

## 2018-07-23 ENCOUNTER — TELEPHONE (OUTPATIENT)
Dept: NEPHROLOGY | Facility: CLINIC | Age: 23
End: 2018-07-23

## 2018-07-23 NOTE — TELEPHONE ENCOUNTER
Attempted to reach patient for transplant follow up. No answer, no voicemail.    Eleanor Harris RN

## 2018-08-09 ENCOUNTER — OFFICE VISIT (OUTPATIENT)
Dept: NEPHROLOGY | Facility: CLINIC | Age: 23
End: 2018-08-09
Attending: INTERNAL MEDICINE
Payer: COMMERCIAL

## 2018-08-09 VITALS
WEIGHT: 255.2 LBS | TEMPERATURE: 99.4 F | HEART RATE: 78 BPM | DIASTOLIC BLOOD PRESSURE: 71 MMHG | BODY MASS INDEX: 32.51 KG/M2 | SYSTOLIC BLOOD PRESSURE: 126 MMHG | OXYGEN SATURATION: 97 %

## 2018-08-09 DIAGNOSIS — E55.9 HYPOVITAMINOSIS D: ICD-10-CM

## 2018-08-09 DIAGNOSIS — I15.1 HYPERTENSION SECONDARY TO OTHER RENAL DISORDERS: ICD-10-CM

## 2018-08-09 DIAGNOSIS — Z48.298 AFTERCARE FOLLOWING ORGAN TRANSPLANT: Primary | ICD-10-CM

## 2018-08-09 DIAGNOSIS — Z94.0 KIDNEY REPLACED BY TRANSPLANT: ICD-10-CM

## 2018-08-09 PROCEDURE — G0463 HOSPITAL OUTPT CLINIC VISIT: HCPCS | Mod: ZF

## 2018-08-09 RX ORDER — SULFAMETHOXAZOLE AND TRIMETHOPRIM 400; 80 MG/1; MG/1
1 TABLET ORAL
Qty: 10 TABLET | Refills: 11 | Status: SHIPPED | OUTPATIENT
Start: 2018-08-09 | End: 2019-09-04

## 2018-08-09 RX ORDER — CYCLOSPORINE 25 MG/1
CAPSULE ORAL
Qty: 240 CAPSULE | Refills: 11 | Status: SHIPPED | OUTPATIENT
Start: 2018-08-09 | End: 2019-07-09

## 2018-08-09 RX ORDER — MYCOPHENOLATE MOFETIL 250 MG/1
1000 CAPSULE ORAL EVERY 12 HOURS
Qty: 240 CAPSULE | Refills: 11 | Status: SHIPPED | OUTPATIENT
Start: 2018-08-09 | End: 2019-08-07

## 2018-08-09 ASSESSMENT — PAIN SCALES - GENERAL: PAINLEVEL: NO PAIN (0)

## 2018-08-09 NOTE — LETTER
8/9/2018      RE: Hussain Steven  [address]  [place] [address]       Assessment and Plan:  1. LDKT with CKD III -  His baseline creatinine is 1.5-1.6 mg /dL. His current immunosuppression is with cyclosporine 125/100 mg, mmf 1000 mg po bid. No BK or DSA on last checks.   2. Immunosuppression: goal cyclo is . Continue his Cellcept at the same dose   3. CKD III: stable kidney function. bp at goal. Follow proteinuria every 12 months. On losartan currently.   4. HTN: at goal on losartan / metoprolol.   5. 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   6. Renal osteodystrophy will check PTH and vitamin D  7. Mild anemia will check iron stores   8. PAPA I advised dermatology screens once a year     Assessment and plan was discussed with patient and he voiced his understanding and agreement.    Reason for Visit:  Mr. Steven is here for routine follow up.    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         Transplant Hx:       Tx: LDKT  Date: 2/3/2009       Present Maintenance IS: Cyclosporine and Mycophenolate mofetil       Baseline Creatinine: 1.7 mg / dl       Recent DSA: No         Biopsy: Yes: 2011 - negative for rejection    Mr. Steven is here for routine follow-up.  He has no specific complaints or concerns.  He is tolerating his immunosuppression fairly well without any complications.  He specifically denied any chest pain shortness of breath nausea vomiting diarrhea.  No night sweats or chills.  He was last seen by dermatologist couple years ago without any known lesions.  He had one episode of abdominal pain 2 weeks ago that resolved spontaneously and currently nontender.  Home BP: at goal.      ROS:   A comprehensive review of systems was obtained and negative, except as noted in the HPI or PMH.    Active Medical Problems:  Patient Active Problem List   Diagnosis     Hypertensive kidney disease     Hydrocele, right      Renal transplant recipient     CKD (chronic kidney disease) stage 2     Personal Hx:  Social History     Social History     Marital status: Single     Spouse name: N/A     Number of children: N/A     Years of education: N/A     Occupational History     Not on file.     Social History Main Topics     Smoking status: Never Smoker     Smokeless tobacco: Never Used     Alcohol use Not on file     Drug use: Not on file     Sexual activity: Not on file     Other Topics Concern     Not on file     Social History Narrative     Allergies:  Allergies   Allergen Reactions     Lisinopril Other (See Comments)     headache     Medications:  Outpatient Encounter Prescriptions as of 8/9/2018   Medication Sig Dispense Refill     GENGRAF (BRAND) 25 MG CAPSULE Take 5 caps (125mg) in the AM and 4 caps (100mg) in the PM. 240 capsule 11     losartan (COZAAR) 50 MG tablet Take 1 tablet (50 mg) by mouth daily 30 tablet 11     metoprolol (LOPRESSOR) 100 MG tablet Take 1 tablet (100 mg) by mouth 2 times daily 60 tablet 1     mycophenolate (GENERIC EQUIVALENT) 250 MG capsule Take 4 capsules (1,000 mg) by mouth every 12 hours 240 capsule 11     sertraline (ZOLOFT) 100 MG tablet Take 100 mg by mouth daily       sulfamethoxazole-trimethoprim (BACTRIM/SEPTRA) 400-80 MG per tablet Take 1 tablet by mouth twice a week Twice a week, decreased for low WBC 10 tablet 11     [DISCONTINUED] GENGRAF (BRAND) 25 MG CAPSULE Take 5 caps (125mg) in the AM and 4 caps (100mg) in the PM. 240 capsule 11     [DISCONTINUED] mycophenolate (GENERIC EQUIVALENT) 250 MG capsule Take 4 capsules (1,000 mg) by mouth every 12 hours 240 capsule 11     [DISCONTINUED] mycophenolate (GENERIC EQUIVALENT) 250 MG capsule Take 4 capsules (1,000 mg) by mouth every 12 hours 240 capsule 6     [DISCONTINUED] sulfamethoxazole-trimethoprim (BACTRIM/SEPTRA) 400-80 MG per tablet Take 1 tablet by mouth twice a week Twice a week, decreased for low WBC 10 tablet 11     No  facility-administered encounter medications on file as of 8/9/2018.          Vitals:  /71 (BP Location: Right arm)  Pulse 78  Temp 99.4  F (37.4  C) (Oral)  Wt 115.8 kg (255 lb 3.2 oz)  SpO2 97%  BMI 32.51 kg/m2    Exam:   GENERAL APPEARANCE: alert and no distress  HENT: mouth without ulcers or lesions  LYMPHATICS: no cervical or supraclavicular nodes  RESP: lungs clear to auscultation - no rales, rhonchi or wheezes  CV: regular rhythm, normal rate, no rub, no murmur  EDEMA: no LE edema bilaterally  ABDOMEN: soft, nondistended, nontender, bowel sounds normal  MS: extremities normal - no gross deformities noted, no evidence of inflammation in joints, no muscle tenderness  SKIN: no rash    Results:   Results were reviewed with the patient today.     Kidney/Pancreas Recipient

## 2018-08-09 NOTE — NURSING NOTE
Chief Complaint   Patient presents with     RECHECK     Follow up Kidney TX     /71 (BP Location: Right arm)  Pulse 78  Temp 99.4  F (37.4  C) (Oral)  Wt 115.8 kg (255 lb 3.2 oz)  SpO2 97%  BMI 32.51 kg/m2   Ally Blake

## 2018-08-09 NOTE — PROGRESS NOTES
Assessment and Plan:  1. LDKT with CKD III -  His baseline creatinine is 1.5-1.6 mg /dL. His current immunosuppression is with cyclosporine 125/100 mg, mmf 1000 mg po bid. No BK or DSA on last checks.   2. Immunosuppression: goal cyclo is . Continue his Cellcept at the same dose   3. CKD III: stable kidney function. bp at goal. Follow proteinuria every 12 months. On losartan currently.   4. HTN: at goal on losartan / metoprolol.   5. 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   6. Renal osteodystrophy will check PTH and vitamin D  7. Mild anemia will check iron stores   8. PAPA I advised dermatology screens once a year     Assessment and plan was discussed with patient and he voiced his understanding and agreement.    Reason for Visit:  Mr. Steven is here for routine follow up.    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         Transplant Hx:       Tx: LDKT  Date: 2/3/2009       Present Maintenance IS: Cyclosporine and Mycophenolate mofetil       Baseline Creatinine: 1.7 mg / dl       Recent DSA: No         Biopsy: Yes: 2011 - negative for rejection    Mr. Steven is here for routine follow-up.  He has no specific complaints or concerns.  He is tolerating his immunosuppression fairly well without any complications.  He specifically denied any chest pain shortness of breath nausea vomiting diarrhea.  No night sweats or chills.  He was last seen by dermatologist couple years ago without any known lesions.  He had one episode of abdominal pain 2 weeks ago that resolved spontaneously and currently nontender.  Home BP: at goal.      ROS:   A comprehensive review of systems was obtained and negative, except as noted in the HPI or PMH.    Active Medical Problems:  Patient Active Problem List   Diagnosis     Hypertensive kidney disease     Hydrocele, right     Renal transplant recipient     CKD (chronic kidney disease) stage 2      Personal Hx:  Social History     Social History     Marital status: Single     Spouse name: N/A     Number of children: N/A     Years of education: N/A     Occupational History     Not on file.     Social History Main Topics     Smoking status: Never Smoker     Smokeless tobacco: Never Used     Alcohol use Not on file     Drug use: Not on file     Sexual activity: Not on file     Other Topics Concern     Not on file     Social History Narrative     Allergies:  Allergies   Allergen Reactions     Lisinopril Other (See Comments)     headache     Medications:  Outpatient Encounter Prescriptions as of 8/9/2018   Medication Sig Dispense Refill     GENGRAF (BRAND) 25 MG CAPSULE Take 5 caps (125mg) in the AM and 4 caps (100mg) in the PM. 240 capsule 11     losartan (COZAAR) 50 MG tablet Take 1 tablet (50 mg) by mouth daily 30 tablet 11     metoprolol (LOPRESSOR) 100 MG tablet Take 1 tablet (100 mg) by mouth 2 times daily 60 tablet 1     mycophenolate (GENERIC EQUIVALENT) 250 MG capsule Take 4 capsules (1,000 mg) by mouth every 12 hours 240 capsule 11     sertraline (ZOLOFT) 100 MG tablet Take 100 mg by mouth daily       sulfamethoxazole-trimethoprim (BACTRIM/SEPTRA) 400-80 MG per tablet Take 1 tablet by mouth twice a week Twice a week, decreased for low WBC 10 tablet 11     [DISCONTINUED] GENGRAF (BRAND) 25 MG CAPSULE Take 5 caps (125mg) in the AM and 4 caps (100mg) in the PM. 240 capsule 11     [DISCONTINUED] mycophenolate (GENERIC EQUIVALENT) 250 MG capsule Take 4 capsules (1,000 mg) by mouth every 12 hours 240 capsule 11     [DISCONTINUED] mycophenolate (GENERIC EQUIVALENT) 250 MG capsule Take 4 capsules (1,000 mg) by mouth every 12 hours 240 capsule 6     [DISCONTINUED] sulfamethoxazole-trimethoprim (BACTRIM/SEPTRA) 400-80 MG per tablet Take 1 tablet by mouth twice a week Twice a week, decreased for low WBC 10 tablet 11     No facility-administered encounter medications on file as of 8/9/2018.          Vitals:  BP  126/71 (BP Location: Right arm)  Pulse 78  Temp 99.4  F (37.4  C) (Oral)  Wt 115.8 kg (255 lb 3.2 oz)  SpO2 97%  BMI 32.51 kg/m2    Exam:   GENERAL APPEARANCE: alert and no distress  HENT: mouth without ulcers or lesions  LYMPHATICS: no cervical or supraclavicular nodes  RESP: lungs clear to auscultation - no rales, rhonchi or wheezes  CV: regular rhythm, normal rate, no rub, no murmur  EDEMA: no LE edema bilaterally  ABDOMEN: soft, nondistended, nontender, bowel sounds normal  MS: extremities normal - no gross deformities noted, no evidence of inflammation in joints, no muscle tenderness  SKIN: no rash    Results:   Results were reviewed with the patient today.

## 2018-08-09 NOTE — MR AVS SNAPSHOT
After Visit Summary   8/9/2018    Hussain Steven    MRN: [id number]           Patient Information     Date Of Birth          1995        Visit Information        Provider Department      8/9/2018 2:45 PM Recipient, Uc Kidney/Pancreas [facility]        Today's Diagnoses     Hypovitaminosis D    -  1    Renal transplant recipient        Hypertension secondary to other renal disorders           Follow-ups after your visit        Follow-up notes from your care team     Return in about 1 year (around 8/9/2019).      Your next 10 appointments already scheduled     Aug 08, 2019  1:30 PM CDT   (Arrive by 1:00 PM)   Return Kidney Transplant with Uc Kidney/Pancreas Recipient   [facility] ([facility])    [address]  [address]  [facility] [address] [phone]              Future tests that were ordered for you today     Open Future Orders        Priority Expected Expires Ordered    IRON Routine  8/9/2019 8/9/2018    Ferritin Routine  8/9/2019 8/9/2018    Parathyroid Hormone Intact Routine  9/8/2018 8/9/2018    Vitamin D Deficiency Routine  9/8/2018 8/9/2018            Who to contact     If you have questions or need follow up information about today's clinic visit or your schedule please contact [facility] directly at [phone].  Normal or non-critical lab and imaging results will be communicated to you by MyChart, letter or phone within 4 business days after the clinic has received the results. If you do not hear from us within 7 days, please contact the clinic through [employer] or phone. If you have a critical or abnormal lab result, we will notify you by phone as soon as possible.  Submit refill requests through [employer] or call your pharmacy and they will forward the refill request to us. Please allow 3 business days for your refill to be completed.          Additional Information About Your Visit        MyChart Information     [employer]  gives you secure access to your electronic health record. If you see a primary care provider, you can also send messages to your care team and make appointments. If you have questions, please call your primary care clinic.  If you do not have a primary care provider, please call [phone] and they will assist you.        Care EveryWhere ID     This is your Care EveryWhere ID. This could be used by other organizations to access your [facility] medical records  [id number]        Your Vitals Were     Pulse Temperature Pulse Oximetry BMI (Body Mass Index)          78 99.4  F (37.4  C) (Oral) 97% 32.51 kg/m2         Blood Pressure from Last 3 Encounters:   08/09/18 126/71   08/01/17 113/69   03/08/17 136/67    Weight from Last 3 Encounters:   08/09/18 115.8 kg (255 lb 3.2 oz)   08/01/17 117.4 kg (258 lb 14.4 oz)   03/08/17 116.2 kg (256 lb 2.8 oz)                 Where to get your medicines      These medications were sent to [facility] [address] [place] [address]  AT [facility] [address]  [address] [facility] [address]     Phone:  [phone]     cycloSPORINE modified 25 MG capsule    mycophenolate 250 MG capsule    sulfamethoxazole-trimethoprim 400-80 MG per tablet          Primary Care Provider Office Phone # Fax #    Mary GLADYS Giles [phone]       [facility] [address] [facility] [address]        Equal Access to Services     ASHLEE KEVIN AH: Hadii violet ku hadasho Soomaali, waaxda luqadaha, qaybta kaalmada adeegyada, eros loya. So [facility] [phone].    ATENCIÓN: Si habla rajinder, tiene a kaufman disposición servicios gratuitos de asistencia lingüística. Llame al [phone].    We comply with applicable federal civil rights laws and [place] laws. We do not discriminate on the basis of race, color, national origin, age, disability, sex, sexual orientation, or gender identity.            Thank you!     Thank you  for choosing [facility]  for your care. Our goal is always to provide you with excellent care. Hearing back from our patients is one way we can continue to improve our services. Please take a few minutes to complete the written survey that you may receive in the mail after your visit with us. Thank you!             Your Updated Medication List - Protect others around you: Learn how to safely use, store and throw away your medicines at www.disposemymeds.org.          This list is accurate as of 8/9/18  3:03 PM.  Always use your most recent med list.                   Brand Name Dispense Instructions for use Diagnosis    cycloSPORINE modified 25 MG capsule     240 capsule    Take 5 caps (125mg) in the AM and 4 caps (100mg) in the PM.    Kidney replaced by transplant       losartan 50 MG tablet    COZAAR    30 tablet    Take 1 tablet (50 mg) by mouth daily    Kidney replaced by transplant, Hypertension secondary to other renal disorders       metoprolol tartrate 100 MG tablet    LOPRESSOR    60 tablet    Take 1 tablet (100 mg) by mouth 2 times daily    Hypertension secondary to other renal disorders, Kidney replaced by transplant       mycophenolate 250 MG capsule    GENERIC EQUIVALENT    240 capsule    Take 4 capsules (1,000 mg) by mouth every 12 hours    Kidney replaced by transplant, Hypertension secondary to other renal disorders       sertraline 100 MG tablet    ZOLOFT     Take 100 mg by mouth daily        sulfamethoxazole-trimethoprim 400-80 MG per tablet    BACTRIM/SEPTRA    10 tablet    Take 1 tablet by mouth twice a week Twice a week, decreased for low WBC    Kidney replaced by transplant, Hypertension secondary to other renal disorders

## 2018-08-24 DIAGNOSIS — Z48.298 AFTERCARE FOLLOWING ORGAN TRANSPLANT: ICD-10-CM

## 2018-08-24 DIAGNOSIS — Z79.899 ENCOUNTER FOR LONG-TERM CURRENT USE OF MEDICATION: ICD-10-CM

## 2018-08-24 DIAGNOSIS — Z94.0 KIDNEY REPLACED BY TRANSPLANT: ICD-10-CM

## 2018-08-24 PROCEDURE — 80158 DRUG ASSAY CYCLOSPORINE: CPT | Performed by: INTERNAL MEDICINE

## 2018-08-28 LAB
CYCLOSPORINE BLD LC/MS/MS-MCNC: 81 UG/L (ref 50–400)
TME LAST DOSE: NORMAL H

## 2018-10-19 DIAGNOSIS — Z79.899 ENCOUNTER FOR LONG-TERM CURRENT USE OF MEDICATION: ICD-10-CM

## 2018-10-19 DIAGNOSIS — Z94.0 KIDNEY REPLACED BY TRANSPLANT: ICD-10-CM

## 2018-10-19 DIAGNOSIS — Z48.298 AFTERCARE FOLLOWING ORGAN TRANSPLANT: ICD-10-CM

## 2018-10-19 PROCEDURE — 80158 DRUG ASSAY CYCLOSPORINE: CPT | Performed by: INTERNAL MEDICINE

## 2018-10-24 LAB
CYCLOSPORINE BLD LC/MS/MS-MCNC: 72 UG/L (ref 50–400)
TME LAST DOSE: NORMAL H

## 2018-12-10 ENCOUNTER — TRANSFERRED RECORDS (OUTPATIENT)
Dept: HEALTH INFORMATION MANAGEMENT | Facility: CLINIC | Age: 23
End: 2018-12-10

## 2018-12-21 DIAGNOSIS — Z79.899 ENCOUNTER FOR LONG-TERM CURRENT USE OF MEDICATION: ICD-10-CM

## 2018-12-21 DIAGNOSIS — Z94.0 KIDNEY REPLACED BY TRANSPLANT: ICD-10-CM

## 2018-12-21 DIAGNOSIS — Z48.298 AFTERCARE FOLLOWING ORGAN TRANSPLANT: ICD-10-CM

## 2018-12-21 PROCEDURE — 80158 DRUG ASSAY CYCLOSPORINE: CPT | Performed by: INTERNAL MEDICINE

## 2018-12-23 LAB
CYCLOSPORINE BLD LC/MS/MS-MCNC: 78 UG/L (ref 50–400)
TME LAST DOSE: NORMAL H

## 2019-03-01 DIAGNOSIS — Z48.298 AFTERCARE FOLLOWING ORGAN TRANSPLANT: ICD-10-CM

## 2019-03-01 DIAGNOSIS — Z94.0 KIDNEY REPLACED BY TRANSPLANT: ICD-10-CM

## 2019-03-01 DIAGNOSIS — Z79.899 ENCOUNTER FOR LONG-TERM CURRENT USE OF MEDICATION: ICD-10-CM

## 2019-03-01 PROCEDURE — 80158 DRUG ASSAY CYCLOSPORINE: CPT | Performed by: INTERNAL MEDICINE

## 2019-03-06 LAB
CYCLOSPORINE BLD LC/MS/MS-MCNC: 83 UG/L (ref 50–400)
TME LAST DOSE: NORMAL H

## 2019-04-30 PROCEDURE — 80158 DRUG ASSAY CYCLOSPORINE: CPT | Performed by: INTERNAL MEDICINE

## 2019-05-02 LAB
CYCLOSPORINE BLD LC/MS/MS-MCNC: 87 UG/L (ref 50–400)
TME LAST DOSE: NORMAL H

## 2019-05-10 DIAGNOSIS — Z94.0 KIDNEY REPLACED BY TRANSPLANT: Primary | ICD-10-CM

## 2019-05-10 DIAGNOSIS — Z48.298 AFTERCARE FOLLOWING ORGAN TRANSPLANT: ICD-10-CM

## 2019-05-10 DIAGNOSIS — Z79.899 ENCOUNTER FOR LONG-TERM CURRENT USE OF MEDICATION: ICD-10-CM

## 2019-06-21 DIAGNOSIS — Z94.0 KIDNEY REPLACED BY TRANSPLANT: ICD-10-CM

## 2019-06-21 DIAGNOSIS — Z48.298 AFTERCARE FOLLOWING ORGAN TRANSPLANT: ICD-10-CM

## 2019-06-21 DIAGNOSIS — Z79.899 ENCOUNTER FOR LONG-TERM CURRENT USE OF MEDICATION: ICD-10-CM

## 2019-06-21 PROCEDURE — 80158 DRUG ASSAY CYCLOSPORINE: CPT | Performed by: INTERNAL MEDICINE

## 2019-06-24 ENCOUNTER — TELEPHONE (OUTPATIENT)
Dept: TRANSPLANT | Facility: CLINIC | Age: 24
End: 2019-06-24

## 2019-06-24 DIAGNOSIS — Z94.0 KIDNEY TRANSPLANTED: Primary | ICD-10-CM

## 2019-06-24 NOTE — LETTER
PHYSICIAN ORDERS      DATE & TIME ISSUED: 2019 11:25 AM  PATIENT NAME: Hussain Steven   : 1995     [facility] MR# [if applicable]: [id number]     DIAGNOSIS:  Kidney transplant  ICD-10 CODE: Z94.0     Please obtain the following labs within 1-2 weeks   BMP   CBC    Any questions please call: [phone]  Please fax results to [phone].

## 2019-06-24 NOTE — TELEPHONE ENCOUNTER
ISSUE:  Creatinine elevated 1.86  Bicarb 17    PLAN:  Call and assess hydration status.  How much water is he drinking per day?  Any recent illness, diarrhea, s/s of a UTI, or medication changes?  Any increased intake of alcohol or caffeine?  Recommend increasing hydration and repeating labs within 1 week.  Will review with MD if sodium bicarb supplement should be started    OUTCOME:  Called and left a v/m requesting a return call to sot office to discuss.

## 2019-06-25 LAB
CYCLOSPORINE BLD LC/MS/MS-MCNC: 97 UG/L (ref 50–400)
TME LAST DOSE: NORMAL H

## 2019-06-27 RX ORDER — SODIUM BICARBONATE 650 MG/1
1300 TABLET ORAL 2 TIMES DAILY
Qty: 120 TABLET | Refills: 11 | Status: SHIPPED | OUTPATIENT
Start: 2019-06-27 | End: 2019-08-26

## 2019-06-27 NOTE — TELEPHONE ENCOUNTER
Called and spoke with Hussain.  He confirms that he has been having some diarrhea over the last week or so.  He states it is starting to slow down.   He denies any other illness, s/s if a UTI, medication changes, or increased intake of caffeine or alcohol.    We discussed the need to start sodium bicarb.  Rx sent.  He states he is aiming to drink about 60 oz of water per day.  I advised him to increase his fluid intake aiming closer to  oz of water daily and repeat labs next week.  Lab order faxed.

## 2019-07-09 DIAGNOSIS — Z94.0 KIDNEY REPLACED BY TRANSPLANT: ICD-10-CM

## 2019-07-09 RX ORDER — CYCLOSPORINE 25 MG/1
CAPSULE ORAL
Qty: 240 CAPSULE | Refills: 11 | Status: SHIPPED | OUTPATIENT
Start: 2019-07-09 | End: 2020-03-23 | Stop reason: ALTCHOICE

## 2019-07-12 DIAGNOSIS — Z48.298 AFTERCARE FOLLOWING ORGAN TRANSPLANT: ICD-10-CM

## 2019-07-12 DIAGNOSIS — Z94.0 KIDNEY REPLACED BY TRANSPLANT: ICD-10-CM

## 2019-07-12 DIAGNOSIS — Z79.899 ENCOUNTER FOR LONG-TERM CURRENT USE OF MEDICATION: ICD-10-CM

## 2019-07-12 LAB
HCT VFR BLD AUTO: 31.2 %
HEMOGLOBIN: 9.6 G/DL (ref 13.3–17.7)

## 2019-07-12 PROCEDURE — 80158 DRUG ASSAY CYCLOSPORINE: CPT | Performed by: INTERNAL MEDICINE

## 2019-07-16 LAB
CYCLOSPORINE BLD LC/MS/MS-MCNC: 94 UG/L (ref 50–400)
TME LAST DOSE: NORMAL H

## 2019-08-07 DIAGNOSIS — I15.1 HYPERTENSION SECONDARY TO OTHER RENAL DISORDERS: ICD-10-CM

## 2019-08-07 DIAGNOSIS — Z94.0 KIDNEY REPLACED BY TRANSPLANT: ICD-10-CM

## 2019-08-07 RX ORDER — MYCOPHENOLATE MOFETIL 250 MG/1
1000 CAPSULE ORAL EVERY 12 HOURS
Qty: 240 CAPSULE | Refills: 11 | Status: SHIPPED | OUTPATIENT
Start: 2019-08-07 | End: 2019-11-12

## 2019-08-07 RX ORDER — MYCOPHENOLATE MOFETIL 250 MG/1
1000 CAPSULE ORAL EVERY 12 HOURS
Qty: 240 CAPSULE | Refills: 11 | Status: SHIPPED | OUTPATIENT
Start: 2019-08-07 | End: 2019-08-07

## 2019-08-26 ENCOUNTER — OFFICE VISIT (OUTPATIENT)
Dept: NEPHROLOGY | Facility: CLINIC | Age: 24
End: 2019-08-26
Attending: INTERNAL MEDICINE
Payer: COMMERCIAL

## 2019-08-26 VITALS
TEMPERATURE: 98.2 F | BODY MASS INDEX: 32.28 KG/M2 | DIASTOLIC BLOOD PRESSURE: 70 MMHG | WEIGHT: 251.5 LBS | OXYGEN SATURATION: 97 % | HEART RATE: 89 BPM | SYSTOLIC BLOOD PRESSURE: 124 MMHG | HEIGHT: 74 IN

## 2019-08-26 DIAGNOSIS — Z94.0 KIDNEY REPLACED BY TRANSPLANT: ICD-10-CM

## 2019-08-26 DIAGNOSIS — N25.81 SECONDARY RENAL HYPERPARATHYROIDISM (H): ICD-10-CM

## 2019-08-26 DIAGNOSIS — D84.9 IMMUNOSUPPRESSION (H): ICD-10-CM

## 2019-08-26 DIAGNOSIS — R63.4 UNINTENTIONAL WEIGHT LOSS: ICD-10-CM

## 2019-08-26 DIAGNOSIS — Z48.298 AFTERCARE FOLLOWING ORGAN TRANSPLANT: ICD-10-CM

## 2019-08-26 DIAGNOSIS — B35.4 TINEA OF THE BODY: ICD-10-CM

## 2019-08-26 DIAGNOSIS — I15.1 HTN, KIDNEY TRANSPLANT RELATED: ICD-10-CM

## 2019-08-26 DIAGNOSIS — R53.83 FATIGUE, UNSPECIFIED TYPE: Primary | ICD-10-CM

## 2019-08-26 DIAGNOSIS — Z79.899 ENCOUNTER FOR LONG-TERM CURRENT USE OF MEDICATION: ICD-10-CM

## 2019-08-26 DIAGNOSIS — R53.83 FATIGUE, UNSPECIFIED TYPE: ICD-10-CM

## 2019-08-26 DIAGNOSIS — N18.30 ANEMIA IN STAGE 3 CHRONIC KIDNEY DISEASE (H): ICD-10-CM

## 2019-08-26 DIAGNOSIS — Z94.0 HTN, KIDNEY TRANSPLANT RELATED: ICD-10-CM

## 2019-08-26 DIAGNOSIS — D63.1 ANEMIA IN STAGE 3 CHRONIC KIDNEY DISEASE (H): ICD-10-CM

## 2019-08-26 PROBLEM — N18.9 ANEMIA IN CHRONIC RENAL DISEASE: Status: ACTIVE | Noted: 2019-08-26

## 2019-08-26 LAB
CREAT UR-MCNC: 88 MG/DL
FERRITIN SERPL-MCNC: 62 NG/ML (ref 26–388)
IRON SERPL-MCNC: 23 UG/DL (ref 35–180)
LDH SERPL L TO P-CCNC: 246 U/L (ref 85–227)
PROT UR-MCNC: 0.15 G/L
PROT/CREAT 24H UR: 0.17 G/G CR (ref 0–0.2)
PTH-INTACT SERPL-MCNC: 12 PG/ML (ref 18–80)

## 2019-08-26 PROCEDURE — 82728 ASSAY OF FERRITIN: CPT | Performed by: INTERNAL MEDICINE

## 2019-08-26 PROCEDURE — 83615 LACTATE (LD) (LDH) ENZYME: CPT | Performed by: INTERNAL MEDICINE

## 2019-08-26 PROCEDURE — 87799 DETECT AGENT NOS DNA QUANT: CPT | Performed by: INTERNAL MEDICINE

## 2019-08-26 PROCEDURE — 36415 COLL VENOUS BLD VENIPUNCTURE: CPT | Performed by: INTERNAL MEDICINE

## 2019-08-26 PROCEDURE — 83540 ASSAY OF IRON: CPT | Performed by: INTERNAL MEDICINE

## 2019-08-26 PROCEDURE — 84156 ASSAY OF PROTEIN URINE: CPT | Performed by: INTERNAL MEDICINE

## 2019-08-26 PROCEDURE — G0463 HOSPITAL OUTPT CLINIC VISIT: HCPCS | Mod: ZF

## 2019-08-26 PROCEDURE — 82306 VITAMIN D 25 HYDROXY: CPT | Performed by: INTERNAL MEDICINE

## 2019-08-26 PROCEDURE — 83970 ASSAY OF PARATHORMONE: CPT | Performed by: INTERNAL MEDICINE

## 2019-08-26 RX ORDER — CLOTRIMAZOLE 1 %
CREAM (GRAM) TOPICAL 2 TIMES DAILY
Qty: 15 G | Refills: 0 | Status: SHIPPED | OUTPATIENT
Start: 2019-08-26 | End: 2021-02-24

## 2019-08-26 ASSESSMENT — MIFFLIN-ST. JEOR: SCORE: 2200.55

## 2019-08-26 ASSESSMENT — PAIN SCALES - GENERAL: PAINLEVEL: NO PAIN (0)

## 2019-08-26 NOTE — NURSING NOTE
Chief Complaint   Patient presents with     RECHECK     Annual follow up     Lizabeth Nesbitt on 8/26/2019 at 1:40 PM

## 2019-08-26 NOTE — PROGRESS NOTES
CHRONIC TRANSPLANT NEPHROLOGY VISIT    Assessment & Plan   # LDKT: Stable   - Baseline Cr ~ 1.6-1.9   - Proteinuria: Normal (<0.2 grams)   - Date DSA Last Checked: Feb/2016       Latest DSA: No   - BK Viremia: Not checked recently due to time from transplant   - Kidney Tx Biopsy: Aug 26, 2011; Result: No diagnostic evidence of acute rejection. Unremarkable.     # Immunosuppression: Cyclosporine (goal ) and Mycophenolate mofetil (goal not followed)   - Changes: No    # Prophylaxis:   - PJP: Sulfa/TMP (Bactrim)    # Hypertension: Controlled; Goal BP: < 130/80   - Changes: No    # Anemia in Chronic Renal Disease: Hgb: Trend down  MELIA: No   - Iron studies: Not checked recently and will recheck iron studies.    # Mineral Bone Disorder:   - Secondary renal hyperparathyroidism; PTH level: Not checked recently and will recheck PTH.  - Vitamin D; level: Not checked recently and will recheck vitamin D level.  - Calcium; level: High and will check PTH and vitamin D levels.    # Electrolytes:   - Potassium; level: Normal  - Bicarbonate; level: Normal and will continue on sodium bicarbonate supplement.    # Unintentional Weight Loss: Patient reports a weight gain of ~30 pounds in the last year, and now has lost all that weight. He says he is continuing to lose weight.  Weight documented today is ~ 251 lbs, which is only 4 lbs down from last clinic appointment a year ago when he was ~ 255 lbs.   - Will check CMV and EBV PCR, as well as LDH as a lymphoma screen.   - If weight loss continues, would recommend obtaining CT chest/abd/pelvis to rule out cancer (lymphoma).   - Would consider changing off mycophenolate if weight loss persists and no obvious etiology.    # Rash, Right Arm: On inner arm. Appears fungal (tinea versicolor).    - Recommend using clotrimazole 1% cream to affected area twice daily.   - If no improvement over the next week, recommend referral to Dermatology.    # Skin Cancer Risk:    - Discussed sun  protection and recommend regular follow up with Dermatology.    # Medical Compliance: Yes    # Transplant History:  Etiology of kidney failure: congenital solitary kidney  Tx: LDKT  Transplant: 2/3/2009 (Kidney)  Donor Type: Living Donor Class: Standard Criteria Donor  Significant changes in immunosuppression: None  Significant transplant-related complications: None    Transplant Office Phone Number: [phone]    Assessment and plan was discussed with the patient and he voiced his understanding and agreement.    Return visit: Return in about 1 year (around 8/26/2020).    Chief Complaint   Mr. Steven is a 24 year old here for routine follow up.    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 He was last seen in clinic by Dr. Vernon on 8/9/18; please see that note for further details.     Since his last visit, the patient reports doing well. However, he lost weight recently without changing anything drastically. He had gained weight (~ 30 pounds) within the last year, but then lost it all prior to today. He feels that he is still losing weight, and thinks that his appetite has decreased as well. He has not changed his diet in the last year. He also suffered from some intermittent nausea that would last for a few hours at a time. The patient also reports getting the chicken pox for the first time in February 2019, but did not mention any complications or issues recovering from it.     Today, the patient reports feeling well. He had no edema in his legs today and denies edema at any other times. His weight today is only four pounds different than it was last year. He had no other questions or concerns today.      Recent Hospitalizations:  [x] No [] Yes    New Medical Issues: [x] No [] Yes    Decreased energy: [] No [x] Yes Has stayed consistent   Chest pain or  SOB with exertion:  [x] No [] Yes    Appetite change or weight change: [x] No [] Yes Slight decrease in appetite, lost weight recently   Nausea, vomiting or diarrhea:  [] No [x] Yes Intermittent nausea   Fever, sweats or chills: [] No [x] Yes Night sweats once per month   Leg swelling: [x] No [] Yes      Home BP: 120-130/70-80    Review of Systems   A comprehensive review of systems was obtained and negative, except as noted in the HPI or PMH.    Problem List   Patient Active Problem List   Diagnosis     HTN, kidney transplant related     Hydrocele, right     Kidney replaced by transplant     CKD (chronic kidney disease) stage 2     Aftercare following organ transplant     Immunosuppression (H)     Anemia in chronic renal disease       Social History   Social History     Tobacco Use     Smoking status: Never Smoker     Smokeless tobacco: Never Used   Substance Use Topics     Alcohol use: Yes     Comment: rare     Drug use: Never       Allergies   Allergies   Allergen Reactions     Lisinopril Other (See Comments)     headache       Medications   Current Outpatient Medications   Medication Sig     clotrimazole (LOTRIMIN) 1 % external cream Apply topically 2 times daily     GENGRAF (BRAND) 25 MG CAPSULE Take 5 caps (125mg) in the AM and 4 caps (100mg) in the PM.     losartan (COZAAR) 50 MG tablet Take 1 tablet (50 mg) by mouth daily     metoprolol (LOPRESSOR) 100 MG tablet Take 1 tablet (100 mg) by mouth 2 times daily     mycophenolate (GENERIC EQUIVALENT) 250 MG capsule Take 4 capsules (1,000 mg) by mouth every 12 hours     sertraline (ZOLOFT) 100 MG tablet Take 100 mg by mouth daily     sulfamethoxazole-trimethoprim (BACTRIM/SEPTRA) 400-80 MG per tablet Take 1 tablet by mouth twice a week Twice a week, decreased for low WBC     No current facility-administered medications for this visit.      Medications Discontinued During This Encounter   Medication Reason     sodium bicarbonate 650 MG tablet        Physical Exam  "  Vital Signs: /70 (BP Location: Right arm, Patient Position: Sitting, Cuff Size: Adult Regular)   Pulse 89   Temp 98.2  F (36.8  C) (Oral)   Ht 1.88 m (6' 2\")   Wt 114.1 kg (251 lb 8 oz)   SpO2 97%   BMI 32.29 kg/m      GENERAL APPEARANCE: alert and no distress  HENT: mouth without ulcers or lesions  LYMPHATICS: no cervical or supraclavicular nodes  RESP: lungs clear to auscultation - no rales, rhonchi or wheezes  CV: regular rhythm, normal rate, no rub, no murmur  EDEMA: no LE edema bilaterally  ABDOMEN: soft, nondistended, nontender, bowel sounds normal, overweight  MS: extremities normal - no gross deformities noted, no evidence of inflammation in joints, no muscle tenderness  SKIN: no rash  TX KIDNEY: normal  DIALYSIS ACCESS:  None      Data     Renal Latest Ref Rng & Units 7/12/2019 6/21/2019 4/30/2019   Na mmol/L - - -   Na (external) 134 - 143 mEq/L 140 139 139   K mmol/L - - -   K (external) 3.4 - 5.1 mEq/L 4.7 4.8 4.5   Cl mmol/L - - -   Cl (external) 99 - 110 mEq/L 104 109 107   CO2 mmol/L - - -   CO2 (external) 19 - 29 mEq/L 23 17(L) 21   BUN mg/dL - - -   BUN (external) 5 - 24 mg/dL 24 39(H) 33(H)   Cr 0.50 - 1.00 mg/dL - - -   Cr (external) 0.70 - 1.20 mg/dL 1.65(H) 1.86(H) 1.77(H)   Glucose 60 - 99 mg/dL - - -   Glucose (external) 70 - 99 mg/dL 99 95 99   Ca  mg/dL - - -   Ca (external) 8.4 - 10.5 mg/dL 10.2 10.7(H) 10.6(H)   Mg mg/dL - - -   Mg (external) 1.5 - 2.2 mEq/L - - -     Bone Health Latest Ref Rng & Units 7/11/2017 6/6/2017 5/26/2017   Phos mg/dL - - -   Phos (external) 2.5 - 4.6 mg/dL 3.9 3.8 3.1   PTHi pg/mL - - -     Heme Latest Ref Rng & Units 7/12/2019 6/21/2019 4/30/2019   WBC 10:9/L - - -   WBC (external) 3.2 - 11.0 10*9/L 8.2 9.3 7.5   Hgb 11.7 - 15.7 gm/dL - - -   Hgb (external) 12.9 - 16.9 g/dL 9.6(L) 9.9(L) 10.9(L)   Plt 10:9/L - - -   Plt (external) 130 - 375 10*9/L 441(H) 418(H) 332     Liver Latest Ref Rng & Units 7/11/2017 6/6/2017 5/26/2017   AP 65 - 260 U/L - - " -   AP (external) 40 - 115 iu/l - - -   TBili 0.2 - 1.3 mg/dL - - -   TBili (external) 0.2 - 1.2 mg/dl - - -   DBili (external) <=0.2 mg/dl - - -   ALT 0 - 50 U/L - - -   ALT (external) 9 - 46 U/L - - -   AST 0 - 45 U/L - - -   AST (external) 10 - 40 U/L - - -   Tot Protein 6.8 - 8.8 g/dL - - -   Tot Protein (external) 6.1 - 8.1 g/dL - - -   Albumin 3.9 - 5.1 g/dL - - -   Albumin (external) 3.5 - 5.0 g/dL 4.1 4.0 4.2        Iron studies Latest Ref Rng & Units 8/11/2009 6/2/2009 4/7/2009   Iron 35 - 180 ug/dL 62 45 75   Ferritin 7 - 142 ng/mL - 23 22     [facility] Txp Virology Latest Ref Rng & Units 12/9/2013 8/9/2013 12/28/2012   CMV IgG EU/mL - - -   CMV IgM <0.90 - - -   CMV IgM Interp <0.90 - - -   CVM DNA Quant - Whole blood, EDTA anticoagulant - -   CMV Quant <100 Copies/mL <100 - -   CMV QT Log <2.0 Log copies/mL <2.0  The Cytomegalovirus DNA Quantitation assay is a real-time polymerase chain   reaction (PCR) utilizing analyte specific reagents manufactured by [employer]   [employer]. Analyte Specific Reagents (ASRs) are used in many laboratory   tests necessary for standard medical care and generally do not require FDA   approval.   This test was developed and its performance characteristics determined by   [facility].  It has not been   cleared or approved by the US Food and Drug Administration. - -   BK Spec - Plasma, EDTA anticoagulant  CORRECTED ON 12/16 AT 0902: PREVIOUSLY REPORTED AS Whole blood, EDTA   anticoagulant Plasma, EDTA anticoagulant  CORRECTED ON 08/13 AT 1007: PREVIOUSLY REPORTED AS Whole blood, EDTA   anticoagulant Plasma, EDTA anticoagulant  CORRECTED ON 01/01 AT 1400: PREVIOUSLY REPORTED AS Whole blood, EDTA   anticoagulant   BK Res <1000 copies/mL <1000 <1000 <1000   BK Log <3.0 Log copies/mL <3.0  The lower limit of detection for this assay is 1000 copies/mL.  Real-time TaqMan   PCR was performed using BK primers and probe for the detection of a 90  bp   portion of the  1 gene.  The performance characteristics were validated by   the [facility].   It has not been cleared or approved by the U.S. Food and Drug Administration. <3.0  The lower limit of detection for this assay is 1000 copies/mL.  Real-time TaqMan   PCR was performed using BK primers and probe for the detection of a 90 bp   portion of the  1 gene.  The performance characteristics were validated by   the [facility].   It has not been cleared or approved by the U.S. Food and Drug Administration. <3.0  The lower limit of detection for this assay is 1000 copies/mL.  Real-time TaqMan   PCR was performed using BK primers and probe for the detection of a 90 bp   portion of the  1 gene.  The performance characteristics were validated by   the [facility].   It has not been cleared or approved by the U.S. Food and Drug Administration.   EBV IgG - - - -   Hep B Core NEG - - -   Hep B Surf - - - -   HIV 1&2 NEG - - -     Scribe Disclosure:  I, Cassia Mendes, am serving as a scribe to document services personally performed by James Dumont M.D. at this visit, based upon the provider's statements to me. All documentation has been reviewed by the aforementioned provider prior to being entered into the official medical record.

## 2019-08-26 NOTE — LETTER
8/26/2019      RE: Hussain Steven  [address]  [place] [address]       CHRONIC TRANSPLANT NEPHROLOGY VISIT    Assessment & Plan   # LDKT: Stable   - Baseline Cr ~ 1.6-1.9   - Proteinuria: Normal (<0.2 grams)   - Date DSA Last Checked: Feb/2016       Latest DSA: No   - BK Viremia: Not checked recently due to time from transplant   - Kidney Tx Biopsy: Aug 26, 2011; Result: No diagnostic evidence of acute rejection. Unremarkable.     # Immunosuppression: Cyclosporine (goal ) and Mycophenolate mofetil (goal not followed)   - Changes: No    # Prophylaxis:   - PJP: Sulfa/TMP (Bactrim)    # Hypertension: Controlled; Goal BP: < 130/80   - Changes: No    # Anemia in Chronic Renal Disease: Hgb: Trend down  MELIA: No   - Iron studies: Not checked recently and will recheck iron studies.    # Mineral Bone Disorder:   - Secondary renal hyperparathyroidism; PTH level: Not checked recently and will recheck PTH.  - Vitamin D; level: Not checked recently and will recheck vitamin D level.  - Calcium; level: High and will check PTH and vitamin D levels.    # Electrolytes:   - Potassium; level: Normal  - Bicarbonate; level: Normal and will continue on sodium bicarbonate supplement.    # Unintentional Weight Loss: Patient reports a weight gain of ~30 pounds in the last year, and now has lost all that weight. He says he is continuing to lose weight.  Weight documented today is ~ 251 lbs, which is only 4 lbs down from last clinic appointment a year ago when he was ~ 255 lbs.   - Will check CMV and EBV PCR, as well as LDH as a lymphoma screen.   - If weight loss continues, would recommend obtaining CT chest/abd/pelvis to rule out cancer (lymphoma).   - Would consider changing off mycophenolate if weight loss persists and no obvious etiology.    # Rash, Right Arm: On inner arm. Appears fungal (tinea versicolor).    - Recommend using clotrimazole 1% cream to affected area twice daily.   - If no improvement over the next week,  recommend referral to Dermatology.    # Skin Cancer Risk:    - Discussed sun protection and recommend regular follow up with Dermatology.    # Medical Compliance: Yes    # Transplant History:  Etiology of kidney failure: congenital solitary kidney  Tx: LDKT  Transplant: 2/3/2009 (Kidney)  Donor Type: Living Donor Class: Standard Criteria Donor  Significant changes in immunosuppression: None  Significant transplant-related complications: None    Transplant Office Phone Number: [phone]    Assessment and plan was discussed with the patient and he voiced his understanding and agreement.    Return visit: Return in about 1 year (around 8/26/2020).    Chief Complaint   Mr. Steven is a 24 year old here for routine follow up.    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 He was last seen in clinic by Dr. Vernon on 8/9/18; please see that note for further details.     Since his last visit, the patient reports doing well. However, he lost weight recently without changing anything drastically. He had gained weight (~ 30 pounds) within the last year, but then lost it all prior to today. He feels that he is still losing weight, and thinks that his appetite has decreased as well. He has not changed his diet in the last year. He also suffered from some intermittent nausea that would last for a few hours at a time. The patient also reports getting the chicken pox for the first time in February 2019, but did not mention any complications or issues recovering from it.     Today, the patient reports feeling well. He had no edema in his legs today and denies edema at any other times. His weight today is only four pounds different than it was last year. He had no other questions or concerns today.      Recent Hospitalizations:  [x] No [] Yes    New Medical Issues: [x] No []  Yes    Decreased energy: [] No [x] Yes Has stayed consistent   Chest pain or SOB with exertion:  [x] No [] Yes    Appetite change or weight change: [x] No [] Yes Slight decrease in appetite, lost weight recently   Nausea, vomiting or diarrhea:  [] No [x] Yes Intermittent nausea   Fever, sweats or chills: [] No [x] Yes Night sweats once per month   Leg swelling: [x] No [] Yes      Home BP: 120-130/70-80    Review of Systems   A comprehensive review of systems was obtained and negative, except as noted in the HPI or PMH.    Problem List   Patient Active Problem List   Diagnosis     HTN, kidney transplant related     Hydrocele, right     Kidney replaced by transplant     CKD (chronic kidney disease) stage 2     Aftercare following organ transplant     Immunosuppression (H)     Anemia in chronic renal disease       Social History   Social History     Tobacco Use     Smoking status: Never Smoker     Smokeless tobacco: Never Used   Substance Use Topics     Alcohol use: Yes     Comment: rare     Drug use: Never       Allergies   Allergies   Allergen Reactions     Lisinopril Other (See Comments)     headache       Medications   Current Outpatient Medications   Medication Sig     clotrimazole (LOTRIMIN) 1 % external cream Apply topically 2 times daily     GENGRAF (BRAND) 25 MG CAPSULE Take 5 caps (125mg) in the AM and 4 caps (100mg) in the PM.     losartan (COZAAR) 50 MG tablet Take 1 tablet (50 mg) by mouth daily     metoprolol (LOPRESSOR) 100 MG tablet Take 1 tablet (100 mg) by mouth 2 times daily     mycophenolate (GENERIC EQUIVALENT) 250 MG capsule Take 4 capsules (1,000 mg) by mouth every 12 hours     sertraline (ZOLOFT) 100 MG tablet Take 100 mg by mouth daily     sulfamethoxazole-trimethoprim (BACTRIM/SEPTRA) 400-80 MG per tablet Take 1 tablet by mouth twice a week Twice a week, decreased for low WBC     No current facility-administered medications for this visit.      Medications Discontinued During This Encounter  "  Medication Reason     sodium bicarbonate 650 MG tablet        Physical Exam   Vital Signs: /70 (BP Location: Right arm, Patient Position: Sitting, Cuff Size: Adult Regular)   Pulse 89   Temp 98.2  F (36.8  C) (Oral)   Ht 1.88 m (6' 2\")   Wt 114.1 kg (251 lb 8 oz)   SpO2 97%   BMI 32.29 kg/m       GENERAL APPEARANCE: alert and no distress  HENT: mouth without ulcers or lesions  LYMPHATICS: no cervical or supraclavicular nodes  RESP: lungs clear to auscultation - no rales, rhonchi or wheezes  CV: regular rhythm, normal rate, no rub, no murmur  EDEMA: no LE edema bilaterally  ABDOMEN: soft, nondistended, nontender, bowel sounds normal, overweight  MS: extremities normal - no gross deformities noted, no evidence of inflammation in joints, no muscle tenderness  SKIN: no rash  TX KIDNEY: normal  DIALYSIS ACCESS:  None      Data     Renal Latest Ref Rng & Units 7/12/2019 6/21/2019 4/30/2019   Na mmol/L - - -   Na (external) 134 - 143 mEq/L 140 139 139   K mmol/L - - -   K (external) 3.4 - 5.1 mEq/L 4.7 4.8 4.5   Cl mmol/L - - -   Cl (external) 99 - 110 mEq/L 104 109 107   CO2 mmol/L - - -   CO2 (external) 19 - 29 mEq/L 23 17(L) 21   BUN mg/dL - - -   BUN (external) 5 - 24 mg/dL 24 39(H) 33(H)   Cr 0.50 - 1.00 mg/dL - - -   Cr (external) 0.70 - 1.20 mg/dL 1.65(H) 1.86(H) 1.77(H)   Glucose 60 - 99 mg/dL - - -   Glucose (external) 70 - 99 mg/dL 99 95 99   Ca  mg/dL - - -   Ca (external) 8.4 - 10.5 mg/dL 10.2 10.7(H) 10.6(H)   Mg mg/dL - - -   Mg (external) 1.5 - 2.2 mEq/L - - -     Bone Health Latest Ref Rng & Units 7/11/2017 6/6/2017 5/26/2017   Phos mg/dL - - -   Phos (external) 2.5 - 4.6 mg/dL 3.9 3.8 3.1   PTHi pg/mL - - -     Heme Latest Ref Rng & Units 7/12/2019 6/21/2019 4/30/2019   WBC 10:9/L - - -   WBC (external) 3.2 - 11.0 10*9/L 8.2 9.3 7.5   Hgb 11.7 - 15.7 gm/dL - - -   Hgb (external) 12.9 - 16.9 g/dL 9.6(L) 9.9(L) 10.9(L)   Plt 10:9/L - - -   Plt (external) 130 - 375 10*9/L 441(H) 418(H) 332 "     Liver Latest Ref Rng & Units 7/11/2017 6/6/2017 5/26/2017   AP 65 - 260 U/L - - -   AP (external) 40 - 115 iu/l - - -   TBili 0.2 - 1.3 mg/dL - - -   TBili (external) 0.2 - 1.2 mg/dl - - -   DBili (external) <=0.2 mg/dl - - -   ALT 0 - 50 U/L - - -   ALT (external) 9 - 46 U/L - - -   AST 0 - 45 U/L - - -   AST (external) 10 - 40 U/L - - -   Tot Protein 6.8 - 8.8 g/dL - - -   Tot Protein (external) 6.1 - 8.1 g/dL - - -   Albumin 3.9 - 5.1 g/dL - - -   Albumin (external) 3.5 - 5.0 g/dL 4.1 4.0 4.2        Iron studies Latest Ref Rng & Units 8/11/2009 6/2/2009 4/7/2009   Iron 35 - 180 ug/dL 62 45 75   Ferritin 7 - 142 ng/mL - 23 22     [facility] Txp Virology Latest Ref Rng & Units 12/9/2013 8/9/2013 12/28/2012   CMV IgG EU/mL - - -   CMV IgM <0.90 - - -   CMV IgM Interp <0.90 - - -   CVM DNA Quant - Whole blood, EDTA anticoagulant - -   CMV Quant <100 Copies/mL <100 - -   CMV QT Log <2.0 Log copies/mL <2.0  The Cytomegalovirus DNA Quantitation assay is a real-time polymerase chain   reaction (PCR) utilizing analyte specific reagents manufactured by [employer]   [facility]. Analyte Specific Reagents (ASRs) are used in many laboratory   tests necessary for standard medical care and generally do not require FDA   approval.   This test was developed and its performance characteristics determined by   [facility].  It has not been   cleared or approved by the US Food and Drug Administration. - -   BK Spec - Plasma, EDTA anticoagulant  CORRECTED ON 12/16 AT 0902: PREVIOUSLY REPORTED AS Whole blood, EDTA   anticoagulant Plasma, EDTA anticoagulant  CORRECTED ON 08/13 AT 1007: PREVIOUSLY REPORTED AS Whole blood, EDTA   anticoagulant Plasma, EDTA anticoagulant  CORRECTED ON 01/01 AT 1400: PREVIOUSLY REPORTED AS Whole blood, EDTA   anticoagulant   BK Res <1000 copies/mL <1000 <1000 <1000   BK Log <3.0 Log copies/mL <3.0  The lower limit of detection for this assay is 1000 copies/mL.   Real-time TaqMan   PCR was performed using BK primers and probe for the detection of a 90 bp   portion of the  1 gene.  The performance characteristics were validated by   the [facility].   It has not been cleared or approved by the U.S. Food and Drug Administration. <3.0  The lower limit of detection for this assay is 1000 copies/mL.  Real-time TaqMan   PCR was performed using BK primers and probe for the detection of a 90 bp   portion of the  1 gene.  The performance characteristics were validated by   the [facility].   It has not been cleared or approved by the U.S. Food and Drug Administration. <3.0  The lower limit of detection for this assay is 1000 copies/mL.  Real-time TaqMan   PCR was performed using BK primers and probe for the detection of a 90 bp   portion of the  1 gene.  The performance characteristics were validated by   the [facility].   It has not been cleared or approved by the U.S. Food and Drug Administration.   EBV IgG - - - -   Hep B Core NEG - - -   Hep B Surf - - - -   HIV 1&2 NEG - - -     Scribe Disclosure:  I, Cassia Mendes, am serving as a scribe to document services personally performed by James Dumont M.D. at this visit, based upon the provider's statements to me. All documentation has been reviewed by the aforementioned provider prior to being entered into the official medical record.     James Dumont MD

## 2019-08-27 LAB
CMV DNA SPEC NAA+PROBE-ACNC: NORMAL [IU]/ML
CMV DNA SPEC NAA+PROBE-LOG#: NORMAL {LOG_IU}/ML
DEPRECATED CALCIDIOL+CALCIFEROL SERPL-MC: 29 UG/L (ref 20–75)
SPECIMEN SOURCE: NORMAL

## 2019-08-28 LAB
EBV DNA # SPEC NAA+PROBE: NORMAL {COPIES}/ML
EBV DNA SPEC NAA+PROBE-LOG#: NORMAL {LOG_COPIES}/ML

## 2019-08-29 ENCOUNTER — TELEPHONE (OUTPATIENT)
Dept: TRANSPLANT | Facility: CLINIC | Age: 24
End: 2019-08-29

## 2019-08-29 DIAGNOSIS — D64.9 ANEMIA: ICD-10-CM

## 2019-08-29 DIAGNOSIS — Z94.0 KIDNEY TRANSPLANTED: Primary | ICD-10-CM

## 2019-08-29 NOTE — TELEPHONE ENCOUNTER
Reviewed low iron with Dr. Vernon.    Plan to check FIT stool sample to see if he is losing blood in stool and refer him to anemia clinic.   Lab order/referral placed.   XRONet message sent to Hussain with recommendations.

## 2019-09-03 ENCOUNTER — TELEPHONE (OUTPATIENT)
Dept: PHARMACY | Facility: CLINIC | Age: 24
End: 2019-09-03

## 2019-09-03 DIAGNOSIS — Z94.0 KIDNEY REPLACED BY TRANSPLANT: Primary | ICD-10-CM

## 2019-09-03 DIAGNOSIS — D63.1 ANEMIA OF CHRONIC RENAL FAILURE, STAGE 3 (MODERATE) (H): ICD-10-CM

## 2019-09-03 DIAGNOSIS — N18.30 CKD (CHRONIC KIDNEY DISEASE) STAGE 3, GFR 30-59 ML/MIN (H): ICD-10-CM

## 2019-09-03 DIAGNOSIS — N18.30 ANEMIA OF CHRONIC RENAL FAILURE, STAGE 3 (MODERATE) (H): ICD-10-CM

## 2019-09-03 NOTE — TELEPHONE ENCOUNTER
Anemia Management Note - Enrollment  SUBJECTIVE/OBJECTIVE:    Referred by Dr. Dwayne Vernon on 2019  Primary Diagnosis: Anemia in Chronic Kidney Disease (N18.3, D63.1)     Secondary Diagnosis:  Chronic Kidney Disease, Stage 3 (N18.3)   Kidney Tx: 2009  Hgb goal range:  9-10  Epo/Darbo: TBD. Initiate when Hgb <9   Iron regimen:  NA  Labs : 2020  Recent MELIA use, transfusion, IV iron: Unknown  RX/TX plans : TBD  No history of stroke, MI and blood clots or cancers    Contact:  Ok to leave message regarding Medical, Billing and Scheduling per consent to communicate dated 2014    OK to speak with Lindsey Steven (mother)and Silvano Steven (father) regarding Medical, Billing and Scheduling per consent to communicate   dated 2014    Labs at [facility].   Phone # [phone]  Fax # [phone]      Anemia Latest Ref Rng & Units 2011   Hemoglobin 13.3 - 17.7 g/dL 13.5 12.7 11.6(L) 11.9 12.9 9.6(A) -   Ferritin 26 - 388 ng/mL - - - - - - 62       BP Readings from Last 3 Encounters:   19 124/70   18 126/71   17 113/69     Wt Readings from Last 2 Encounters:   19 114.1 kg (251 lb 8 oz)   18 115.8 kg (255 lb 3.2 oz)     Current Outpatient Medications   Medication Sig Dispense Refill     clotrimazole (LOTRIMIN) 1 % external cream Apply topically 2 times daily 15 g 0     GENGRAF (BRAND) 25 MG CAPSULE Take 5 caps (125mg) in the AM and 4 caps (100mg) in the PM. 240 capsule 11     losartan (COZAAR) 50 MG tablet Take 1 tablet (50 mg) by mouth daily 30 tablet 11     metoprolol (LOPRESSOR) 100 MG tablet Take 1 tablet (100 mg) by mouth 2 times daily 60 tablet 1     mycophenolate (GENERIC EQUIVALENT) 250 MG capsule Take 4 capsules (1,000 mg) by mouth every 12 hours 240 capsule 11     sertraline (ZOLOFT) 100 MG tablet Take 100 mg by mouth daily       sulfamethoxazole-trimethoprim (BACTRIM/SEPTRA) 400-80  MG per tablet Take 1 tablet by mouth twice a week Twice a week, decreased for low WBC 10 tablet 11     ASSESSMENT:  Hgb At goal. Due to have labs drawn.  Ferritin: Due for labs  TSat: Due for labs  Iron regimen recommended: NA  Recommended MELIA regimen to initiate when Hgb < 9  Blood Pressure: Stable    PLAN:  1.Spoke with Hussain today for enrollment in Anemia Management Service.  2. Discussed:  anemia overview, monitoring service and goal hemoglobin range and rationale and risks of MELIA blood clots, stroke and increase in blood pressure  3. Dose location: [facility]  4. Labs: [facility]  5. Pharmacy: [facility]      9/10/19; Hussain will start taking Ferrous Sulfate 1 tab daily with lunch.  Recheck Iron labs in 4 weeks.  He will have his Hgb drawn early next week. Has been fatigued. Andrews start MELIA therapy if Hgb <9.     Patient verbalized understanding of the plan.     Next call date:  09/06/2019 9/11 fax labs to [facility] Levi RN   Anemia Services  [address] [address]   [email or web address]   Office : [phone]  Fax: [phone]

## 2019-09-04 DIAGNOSIS — Z94.0 KIDNEY REPLACED BY TRANSPLANT: ICD-10-CM

## 2019-09-04 DIAGNOSIS — I15.1 HYPERTENSION SECONDARY TO OTHER RENAL DISORDERS: ICD-10-CM

## 2019-09-04 RX ORDER — SULFAMETHOXAZOLE AND TRIMETHOPRIM 400; 80 MG/1; MG/1
1 TABLET ORAL
Qty: 10 TABLET | Refills: 11 | Status: SHIPPED | OUTPATIENT
Start: 2019-09-05

## 2019-09-06 DIAGNOSIS — Z79.899 ENCOUNTER FOR LONG-TERM CURRENT USE OF MEDICATION: ICD-10-CM

## 2019-09-06 DIAGNOSIS — Z94.0 KIDNEY REPLACED BY TRANSPLANT: ICD-10-CM

## 2019-09-06 DIAGNOSIS — Z48.298 AFTERCARE FOLLOWING ORGAN TRANSPLANT: ICD-10-CM

## 2019-09-06 PROCEDURE — 80158 DRUG ASSAY CYCLOSPORINE: CPT | Performed by: INTERNAL MEDICINE

## 2019-09-10 ENCOUNTER — TELEPHONE (OUTPATIENT)
Dept: TRANSPLANT | Facility: CLINIC | Age: 24
End: 2019-09-10

## 2019-09-10 NOTE — LETTER
PHYSICIAN ORDERS      DATE & TIME ISSUED: September 10, 2019 9:28 AM  PATIENT NAME: Hussain Steven   : 1995     [facility] MR# [if applicable]: [id number]     DIAGNOSIS:  Kidney transplant/ Hypercalcemia  ICD-10 CODE: Z94.0/ E83.52    Please obtain the following labs within 1-2 weeks   Renal Panel    TSH   1,25 Dihydroxyvitamin D   PTH Related Peptide Test (aka Parathyroid Hormone Related Protein)       Any questions please call: [phone]  Please fax results to [phone].    .

## 2019-09-10 NOTE — TELEPHONE ENCOUNTER
ISSUE:  Calcium 11.7  PTH 12   Vitamin D 29    PLAN:  Call and encourage good hydration.  Confirm patient is taking no calcium supplements (including TUMS).  Plan to check Renal panel, 1,25 dihydroxy vitamin D, TSH, and PTHrp.    OUTCOME:  Called and spoke with Hussain.  He denies any calcium supplements or TUMS.  He verbalized understanding to increase hydration and repeat labs this week.  Lab orders faxed to [facility] in [place].    James Dumont MD Hasebroock, Rebecca, RN Becca,     Please make sure patient is hydrated and recheck renal panel (includes BMP plus albumin and phosphorus).  Also check 1,25 dihydroxy vitamin D (not vitamin D deficiency, but 1,25) and TSH and PTHrp (different PTH).     Make sure he is not taking any calcium supplement for heartburn or anything.     Thanks.

## 2019-09-11 LAB
CYCLOSPORINE BLD LC/MS/MS-MCNC: 71 UG/L (ref 50–400)
TME LAST DOSE: NORMAL H

## 2019-09-18 LAB
HCT VFR BLD AUTO: 30.2 %
HEMOGLOBIN: 9.3 G/DL (ref 13.3–17.7)

## 2019-09-19 ENCOUNTER — TELEPHONE (OUTPATIENT)
Dept: PHARMACY | Facility: CLINIC | Age: 24
End: 2019-09-19

## 2019-09-19 NOTE — TELEPHONE ENCOUNTER
Anemia Management Note  SUBJECTIVE/OBJECTIVE:  Referred by Dr. Dwayne Vernon on 2019  Primary Diagnosis: Anemia in Chronic Kidney Disease (N18.3, D63.1)     Secondary Diagnosis:  Chronic Kidney Disease, Stage 3 (N18.3)   Kidney Tx: 2009  Hgb goal range:  9-10  Epo/Darbo: TBD. Initiate when Hgb <9   Iron regimen:  NA  Labs : 2020  Recent MELIA use, transfusion, IV iron: Unknown  RX/TX plans : TBD  No history of stroke, MI and blood clots or cancers     Contact:            Ok to leave message regarding Medical, Billing and Scheduling per consent to communicate dated 2014                              OK to speak with Lindsey Steven (mother)and Silvano Steven (father) regarding Medical, Billing and Scheduling per consent to communicate   dated 2014     Labs at [facility].   Phone # [phone]  Fax # [phone]    Anemia Latest Ref Rng & Units 2011   Hemoglobin 13.3 - 17.7 g/dL 12.7 11.6(L) 11.9 12.9 9.6(A) - 9.3(A)   Ferritin 26 - 388 ng/mL - - - - - 62 -     BP Readings from Last 3 Encounters:   19 124/70   18 126/71   17 113/69     Wt Readings from Last 2 Encounters:   19 114.1 kg (251 lb 8 oz)   18 115.8 kg (255 lb 3.2 oz)       Per conversation on 9/10/19; Hussain will start taking Ferrous Sulfate 1 tab daily with lunch.  Recheck Iron labs in 4 weeks.  He will have his Hgb drawn early next week. Has been fatigued. Andrews start MELIA therapy if Hgb <9.    ASSESSMENT:  Hgb:at goal - continue to monitor  TSat: Due for labs Ferritin: Due for labs    PLAN:  RTC for Hgb in 1-2 week(s)    Orders needed to be renewed (for next follow-up date) in LETTERS - for external labs: None    Iron labs due:  10/08/2019    Plan discussed with:  LVM for Hussain  Plan provided by:  [facility]    NEXT FOLLOW-UP DATE:  2019    Anemia Management Service  Liudmila Levi RN  Phone: [phone]  Fax:  [phone]

## 2019-09-23 ENCOUNTER — TELEPHONE (OUTPATIENT)
Dept: TRANSPLANT | Facility: CLINIC | Age: 24
End: 2019-09-23

## 2019-09-23 DIAGNOSIS — Z94.0 KIDNEY TRANSPLANTED: Primary | ICD-10-CM

## 2019-09-23 NOTE — TELEPHONE ENCOUNTER
Contacted patients lab to follow up on 1,25 dihydroxy vitamin D and PTHrp.  Per Laury in the lab, they are still in process and they will fax results when they are in.

## 2019-09-24 RX ORDER — SODIUM BICARBONATE 650 MG/1
650 TABLET ORAL 2 TIMES DAILY
Qty: 60 TABLET | Refills: 11 | Status: SHIPPED | OUTPATIENT
Start: 2019-09-24 | End: 2021-02-23

## 2019-09-24 NOTE — TELEPHONE ENCOUNTER
ISSUE:  Bicarb 18     PLAN:  Call and see if he has been having any diarrhea.   Encourage good hydration   Recommend starting sodium bicarb 650 mg BID    OUTCOME:  Called and spoke with Hussain.  He denies any recent illness/diarrhea.    He verbalized understanding to start bicarb supplement.  Rx sent.    James Dumont MD Hasebroock, Rebecca, VERONICA             Would start sodium bicarbonate 650 mg bid.

## 2019-09-26 ENCOUNTER — TELEPHONE (OUTPATIENT)
Dept: TRANSPLANT | Facility: CLINIC | Age: 24
End: 2019-09-26

## 2019-09-26 NOTE — TELEPHONE ENCOUNTER
Will continue to monitor labs per Dr. Dumont.     James Dumont MD Hasebroock, Rebecca, VERONICA             No, just follow.    Previous Messages      ----- Message -----   From: Dinora Kebede, RN   Sent: 9/26/2019   8:45 AM CDT   To: James Dumont MD   Subject: review labs                                       Hi Dr. Dumont-   Please review and advise on Hussain's labs.  His 1,25 dihydroxy vitamin d came back at 51.2, PTHrp at 0.7, TSH was 1.58.  Calcium improved from 11.7 to 10.8, albumin and phos were normal.       Let me know if you have further recommendations.   ----- Message -----   From: James Dumont MD   Sent: 9/9/2019   7:28 PM CDT   To: Dinora Kebede, VERONICA     Chhaya,     Please make sure patient is hydrated and recheck renal panel (includes BMP plus albumin and phosphorus).  Also check 1,25 dihydroxy vitamin D (not vitamin D deficiency, but 1,25) and TSH and PTHrp (different PTH).

## 2019-10-03 ENCOUNTER — TELEPHONE (OUTPATIENT)
Dept: PHARMACY | Facility: CLINIC | Age: 24
End: 2019-10-03

## 2019-10-03 DIAGNOSIS — Z94.0 KIDNEY REPLACED BY TRANSPLANT: ICD-10-CM

## 2019-10-03 DIAGNOSIS — Z48.298 AFTERCARE FOLLOWING ORGAN TRANSPLANT: ICD-10-CM

## 2019-10-03 DIAGNOSIS — Z79.899 ENCOUNTER FOR LONG-TERM CURRENT USE OF MEDICATION: ICD-10-CM

## 2019-10-03 NOTE — TELEPHONE ENCOUNTER
Follow-up with anemia management service:    LVM for Hussain to remind him that he is due to have labs drawn.     Anemia Latest Ref Rng & Units 8/26/2011 8/26/2011 8/27/2011 11/4/2011 7/12/2019 8/26/2019 9/18/2019   Hemoglobin 13.3 - 17.7 g/dL 12.7 11.6(L) 11.9 12.9 9.6(A) - 9.3(A)   Ferritin 26 - 388 ng/mL - - - - - 62 -           Follow-up call date: 10/09/2019        Anemia Management Service  Liudmila Levi RN  Phone: [phone]  Fax: [phone]

## 2019-10-04 ENCOUNTER — HOSPITAL ENCOUNTER (OUTPATIENT)
Facility: CLINIC | Age: 24
Setting detail: SPECIMEN
Discharge: HOME | End: 2019-10-04
Admitting: INTERNAL MEDICINE
Payer: COMMERCIAL

## 2019-10-04 LAB
HCT VFR BLD AUTO: 30.2 %
HEMOGLOBIN: 9.1 G/DL (ref 13.3–17.7)

## 2019-10-04 PROCEDURE — 80158 DRUG ASSAY CYCLOSPORINE: CPT | Performed by: INTERNAL MEDICINE

## 2019-10-07 ENCOUNTER — TELEPHONE (OUTPATIENT)
Dept: PHARMACY | Facility: CLINIC | Age: 24
End: 2019-10-07

## 2019-10-07 DIAGNOSIS — N18.30 CKD (CHRONIC KIDNEY DISEASE) STAGE 3, GFR 30-59 ML/MIN (H): ICD-10-CM

## 2019-10-07 DIAGNOSIS — N18.30 ANEMIA OF CHRONIC RENAL FAILURE, STAGE 3 (MODERATE) (H): ICD-10-CM

## 2019-10-07 DIAGNOSIS — D63.1 ANEMIA OF CHRONIC RENAL FAILURE, STAGE 3 (MODERATE) (H): ICD-10-CM

## 2019-10-07 NOTE — TELEPHONE ENCOUNTER
Anemia Management Note  SUBJECTIVE/OBJECTIVE:  Referred by Dr. Dwayne Vernon on 2019  Primary Diagnosis: Anemia in Chronic Kidney Disease (N18.3, D63.1)     Secondary Diagnosis:  Chronic Kidney Disease, Stage 3 (N18.3)   Kidney Tx: 2009  Hgb goal range:  9-10  Epo/Darbo: Aranesp 60mcg every 14 days for Hgb <10. In Clinic.   Iron regimen:  NA  Labs : 2020  Recent MELIA use, transfusion, IV iron: Unknown  RX/TX plans : TBD  No history of stroke, MI and blood clots or cancers     Contact:            Ok to leave message regarding Medical, Billing and Scheduling per consent to communicate dated 2014                              OK to speak with Lindsey Steven (mother)and Silvano Steven (father) regarding Medical, Billing and Scheduling per consent to communicate   dated 2014    Anemia Latest Ref Rng & Units 2011 2011 2011 2019 2019 2019 10/4/2019   Hemoglobin 13.3 - 17.7 g/dL 11.6(L) 11.9 12.9 9.6(A) - 9.3(A) 9.1(A)   Ferritin 26 - 388 ng/mL - - - - 62 - -     BP Readings from Last 3 Encounters:   19 124/70   18 126/71   17 113/69     Wt Readings from Last 2 Encounters:   19 114.1 kg (251 lb 8 oz)   18 115.8 kg (255 lb 3.2 oz)       Hussain called and would like to start the Aranesp injections.   Order placed and sent to Dr. Vernon for signature. Once Orders are singed will discuss injections with [facility].  [facility] is a 2hr drive.  Hussain was hesitant to give himself the injections.         PLAN:  Once Aranesp orders are signed call [facility] in [place] to arrange injections.         NEXT FOLLOW-UP DATE:  10/08/2019  Have orders been signed?    Anemia Management Service  Liudmila Levi RN  Phone: [phone]  Fax: [phone]

## 2019-10-08 LAB
CYCLOSPORINE BLD LC/MS/MS-MCNC: 73 UG/L (ref 50–400)
TME LAST DOSE: NORMAL H

## 2019-10-09 ENCOUNTER — TELEPHONE (OUTPATIENT)
Dept: PHARMACY | Facility: CLINIC | Age: 24
End: 2019-10-09

## 2019-10-09 NOTE — TELEPHONE ENCOUNTER
Aranesp orders faxed to Infusion [facility] at [facility] in [place], [place]  Fax #[phone].    Received call from [facility] Curiel. The fax # I  was given was for [facility].  Called [phone] (Ambulatory [facility]fusion [facility]) and ROSMERY for Fax # to [facility].     Hussain was called and is aware that orders have been faxed.     Liudmila Levi RN   Anemia Services  [facility]  [address] [place] [id number]   [email or web address]   Office : [phone]  Fax: [phone]

## 2019-10-09 NOTE — TELEPHONE ENCOUNTER
Follow-up with anemia management service:    LM at [facility] to see if they are able to administer Aranesp injections for Hussain.     Waiting for call back.     Anemia Latest Ref Rng & Units 8/26/2011 8/27/2011 11/4/2011 7/12/2019 8/26/2019 9/18/2019 10/4/2019   Hemoglobin 13.3 - 17.7 g/dL 11.6(L) 11.9 12.9 9.6(A) - 9.3(A) 9.1(A)   Ferritin 26 - 388 ng/mL - - - - 62 - -           Follow-up call date: 10/09/2019        Anemia Management Service  Liudmila Levi RN  Phone: [phone]  Fax: [phone]

## 2019-10-10 NOTE — TELEPHONE ENCOUNTER
Spoke with Klarissa at the injection clinic.  They are able to give Aranesp injections.     Orders Faxed to [phone]  Ph# to the injection clinic is [phone].    Liudmila Levi RN   Anemia Services  [address] [address]   [email or web address]   Office : [phone]  Fax: [phone]

## 2019-10-15 ENCOUNTER — TELEPHONE (OUTPATIENT)
Dept: PHARMACY | Facility: CLINIC | Age: 24
End: 2019-10-15

## 2019-10-15 ENCOUNTER — TELEPHONE (OUTPATIENT)
Dept: INFUSION THERAPY | Facility: OTHER | Age: 24
End: 2019-10-15

## 2019-10-15 NOTE — TELEPHONE ENCOUNTER
Rec'd call from Liudmila with the Anemia Clinic at the [facility]. Would like for patient to have his Aranesp injections here as it is closer to home. Treatment plan is able to be reviewed, as well as labs. Confirmed scheduling phone number with Liudmila who will give number to patient. This will pt's first injection, so whenever we can get him on the schedule will work for a date and time.     Routing to [facility] to give heads up that patient will be calling.

## 2019-10-15 NOTE — TELEPHONE ENCOUNTER
Patient scheduled for 10-17-19, as patient wanted to get first injection done right away.  Pharmacy notified.

## 2019-10-15 NOTE — TELEPHONE ENCOUNTER
Follow-up with anemia management service:    Received a call from Klarissa at [facility] in [place], [place] . They are unable to obtain a PA for an outside provider, and will not be able to give the Arasnesp.     Spoke with Hussain, he is NOT interested in giving injections to himself.  He would like for me to check with [facility] ([phone]) or [facility] ([phone])    Call placed to [facility]. They are able to give him his Aranesp.     Spoke with nurse in the infusion Center ([phone])     Hussain is to call [phone] to schedule an appt.     Anemia Latest Ref Rng & Units 8/26/2011 8/27/2011 11/4/2011 7/12/2019 8/26/2019 9/18/2019 10/4/2019   Hemoglobin 13.3 - 17.7 g/dL 11.6(L) 11.9 12.9 9.6(A) - 9.3(A) 9.1(A)   Ferritin 26 - 388 ng/mL - - - - 62 - -           Follow-up call date: 10/17/2019  Did he get Aranesp?    Anemia Management Service  Liudmila Levi RN  Phone: [phone]  Fax: [phone]

## 2019-10-16 ENCOUNTER — TELEPHONE (OUTPATIENT)
Dept: NEPHROLOGY | Facility: CLINIC | Age: 24
End: 2019-10-16

## 2019-10-16 NOTE — TELEPHONE ENCOUNTER
Patient called yesterday requesting he be placed on the schedule at [facility] to receive his aranesp injection.  Therapy plan in place, however this will need to be co-signed from a provider with signing privileges at [facility] prior to patient receiving injection.  This telephone encounter to be routed to Dr. Enedelia Saavedra, CMO,  for approval to administer injection.  All labwork will be sent to Dr. Vernon.      Received call from Everton Boucher, , that patient has not had iron studies since August.  She is checking with patient's insurance on this.  Administration of injection will need to wait until prior authorization is received.     Everton is calling patient to inform him of this and will reach out to Dr. Vernon regarding any further labs needed for coverage of aranesp.

## 2019-10-22 ENCOUNTER — TELEPHONE (OUTPATIENT)
Dept: PHARMACY | Facility: CLINIC | Age: 24
End: 2019-10-22

## 2019-10-22 NOTE — TELEPHONE ENCOUNTER
Follow-up with anemia management service:    LVM for Hussain for an update re Aranesp injections at [facility].     Currently has an appt scheduled for 11/1/2019    Anemia Latest Ref Rng & Units 8/26/2011 8/27/2011 11/4/2011 7/12/2019 8/26/2019 9/18/2019 10/4/2019   Hemoglobin 13.3 - 17.7 g/dL 11.6(L) 11.9 12.9 9.6(A) - 9.3(A) 9.1(A)   Ferritin 26 - 388 ng/mL - - - - 62 - -           Follow-up call date: 10/30/2019        Anemia Management Service  Liudmila Levi RN  Phone: [phone]  Fax: [phone]

## 2019-10-24 ENCOUNTER — TELEPHONE (OUTPATIENT)
Dept: NEPHROLOGY | Facility: CLINIC | Age: 24
End: 2019-10-24

## 2019-10-24 ENCOUNTER — TELEPHONE (OUTPATIENT)
Dept: PHARMACY | Facility: CLINIC | Age: 24
End: 2019-10-24

## 2019-10-24 DIAGNOSIS — Z94.0 KIDNEY REPLACED BY TRANSPLANT: Primary | ICD-10-CM

## 2019-10-24 DIAGNOSIS — D63.1 ANEMIA OF CHRONIC RENAL FAILURE: ICD-10-CM

## 2019-10-24 DIAGNOSIS — N18.9 ANEMIA OF CHRONIC RENAL FAILURE: ICD-10-CM

## 2019-10-24 DIAGNOSIS — N18.30 CKD (CHRONIC KIDNEY DISEASE) STAGE 3, GFR 30-59 ML/MIN (H): ICD-10-CM

## 2019-10-24 NOTE — TELEPHONE ENCOUNTER
Anemia Management Note  SUBJECTIVE/OBJECTIVE:  Referred by Dr. Dwayne Vernon on 2019  Primary Diagnosis: Anemia in Chronic Kidney Disease (N18.3, D63.1)     Secondary Diagnosis:  Chronic Kidney Disease, Stage 3 (N18.3)   Kidney Tx: 2009  Hgb goal range:  9-10  Epo/Darbo: Aranesp 60mcg every 14 days for Hgb <10. In Clinic.   Iron regimen:  NA  Labs : 2020  Recent MELIA use, transfusion, IV iron: Unknown  RX/TX plans : TBD  No history of stroke, MI and blood clots or cancers     Contact:            Ok to leave message regarding Medical, Billing and Scheduling per consent to communicate dated 2014                              OK to speak with Lindsey Steven (mother)and Silvano Steven (father) regarding Medical, Billing and Scheduling per consent to communicate   dated 2014    Aranesp will be given at [facility] [place].   #[phone]  Fax # to lab is [phone]    Scheduling # is [phone]    Anemia Latest Ref Rng & Units 2011 2011 2011 2019 2019 2019 10/4/2019   Hemoglobin 13.3 - 17.7 g/dL 11.6(L) 11.9 12.9 9.6(A) - 9.3(A) 9.1(A)   Ferritin 26 - 388 ng/mL - - - - 62 - -     BP Readings from Last 3 Encounters:   19 124/70   18 126/71   17 113/69     Wt Readings from Last 2 Encounters:   19 114.1 kg (251 lb 8 oz)   18 115.8 kg (255 lb 3.2 oz)       Lab orders faxed to [facility] (they were unable to see standing lab orders).    Hussain will have his labs drawn in the next couple of days.     Labs need to be complete before Prior Auth is given for the Aranesp.         PLAN:  Follow up 10/29/2019  Did he get his labs drawn?            Anemia Management Service  Liudmila Levi RN  Phone: [phone]  Fax: [phone]

## 2019-10-24 NOTE — TELEPHONE ENCOUNTER
Sosa with Dr. Vernon called stating Dr. Vernon has placed orders for patient to have iron studies done.  Unable to view these orders.  She will fax to the [facility].  Patient has been instructed to come into lab as soon as he can, and is scheduled for Aranesp injection on 11/1/19.  Sosa states she will follow up with patient on Tuesday.

## 2019-10-29 NOTE — TELEPHONE ENCOUNTER
Hussain has not gone in yet for Iron labs. He is aware that he needs to have Iron and Ferritin levels before his insurance will approve his Aranesp.    Liudmila Levi RN   Anemia Services  [facility]  [address] [place] [address]   [email or web address]   Office : [phone]  Fax: [phone]

## 2019-10-30 ENCOUNTER — TELEPHONE (OUTPATIENT)
Dept: TRANSPLANT | Facility: CLINIC | Age: 24
End: 2019-10-30

## 2019-10-30 PROCEDURE — 87799 DETECT AGENT NOS DNA QUANT: CPT | Performed by: PATHOLOGY

## 2019-10-30 NOTE — TELEPHONE ENCOUNTER
Received a call back from Trini RN at patients PCP's office.  Explained Dr. Dumnot's recommendation regarding rechecking EBV/LDH and making sure biopsy is stained for EBV.    Discussed I did speak with the RNCC at the surgeons office and she will be discussing that with the surgeon.    Gave her a heads up of patients mother's concerns regarding increased n/v.  Will discuss at appointment this afternoon.  Asked that they keep us in updated regarding patients results and plan of care going forward.  Trini verbalized understanding, given fax number.

## 2019-10-30 NOTE — TELEPHONE ENCOUNTER
Returned call to patients mother Lindsey.  She explained that Hussain has been experiencing unintentional weight loss since February, and recently developed abdominal pain, n/v, and decreased appetitie.  His PCP did a CT scan that showed a suspicious mass in LLQ.  Per patients mother, it is on the small intestine.  He is scheduled to have a biopsy on 11/4 in [place].  I discussed that if he wants to have it done here at the [facility] I can help arrange this.  Lindsey states they prefer to have it done closer to home.  Per Dr. Dumont, this is okay.  I explained to Lindsey that we need to be kept in the loop of the results and the treatment plan.    She is concerned that the n/v has increased and he could be getting dehydrated.  I explained the concerns of the mass possibly leading to an intestinal blockage and that increased abdominal pain or n/v would warrant evaluation in the ER.  Lindsey reports they are seeing the PCP this afternoon to discuss the plan and have Hussain assessed.  Encouraged good hydration as tolerated.  Discussed the need to go to the ED if unable to keep fluids or IS medication down.     I explained to Lindsey that Dr. Dumont is recommending we recheck EBV/LDH.  Lab orders faxed and he will have them drawn today.     Called patient's PCP office to discuss the plan going forward, asked that we be kept in the loop, and give Dr. Thurston recommendation to recheck EBV/LDH and make sure biopsy is stained for EBV.  No answer, left v/m.    Per Care everywhere, biopsy to be done by Dr. Hussain Holly in [place].  Contacted Dr. Holly's office and spoke with Cecelia RNCC.  Recommended that they stain biopsy for EBV per Dr. Dumont.  Cecelia will review it with Dr. Holly and contact us if there are any further questions, otherwise they will make sure the stain is added on.     James Dumont MD [place], Rebecca, RN             Would offer to have it done here, but okay if he really wants it done up there.  Would  recheck EBV PCR and LDH.  Make sure they stain it for EBV.     We need to stay in the loop in this.     Damir    Previous Messages      ----- Message -----   From: Dinora Kebede RN   Sent: 10/30/2019  10:49 AM CDT   To: James Dumont MD   Subject: mass found on CT                                 Hi Dr. Dumont-   You saw Hussain in August and he had reported an unintentional weight loss of about 30 lbs over the last year (although the documented weight loss in our system didn't reflect the weight loss).  We check CMV/EBV and both were negative.  LDH was mildly elevated at 246.       He recently went to his PCP for abdominal pain.  They did a CT scan that showed a LLQ mass- see impression below.     IMPRESSION:    1.  Large 18.9 cm multilobulated masslike structure within the left lower quadrant. Additional 6.1 cm soft tissue mass within the midpelvis. Findings are suspicious for a neoplastic process.    2.  Multiple dilated loops of small bowel with a questionable transition point in the midpelvis adjacent to the aforementioned soft tissue mass, nonspecific, may represent small bowel obstruction versus ileus.      He is getting set up to have a biopsy up in [place] with general surgery.  Wanted to give you an update and also check if this is something we would recommend he have done here due to his transplant, or is it ok to have done in [place]?  Looks like his kidney is on the right side.     Chhaya

## 2019-10-31 ENCOUNTER — TELEPHONE (OUTPATIENT)
Dept: PHARMACY | Facility: CLINIC | Age: 24
End: 2019-10-31

## 2019-10-31 NOTE — TELEPHONE ENCOUNTER
LVM for Hussain re labs.  Requested that he have his labs drawn today at [facility]. Informed Hussain via VM that appt for Aranesp on 11/1/2019 has been canceled.     Instructed Hussain to call me with any questions.    Liudmila Levi RN   Anemia Services  [facility]  [address] [place] [address]   [email or web address]   Office : [phone]  Fax: [phone]

## 2019-10-31 NOTE — TELEPHONE ENCOUNTER
Anemia Management Note  SUBJECTIVE/OBJECTIVE:  Referred by Dr. Dwayne Vernon on 2019  Primary Diagnosis: Anemia in Chronic Kidney Disease (N18.3, D63.1)     Secondary Diagnosis:  Chronic Kidney Disease, Stage 3 (N18.3)   Kidney Tx: 2009  Hgb goal range:  9-10  Epo/Darbo: TBD. Initiate when Hgb <9   Iron regimen:  NA  Labs : 2020  Recent MELIA use, transfusion, IV iron: Unknown  RX/TX plans : TBD  No history of stroke, MI and blood clots or cancers     Contact:            Ok to leave message regarding Medical, Billing and Scheduling per consent to communicate dated 2014                              OK to speak with Lindsey Steven (mother)and Silvano Steven (father) regarding Medical, Billing and Scheduling per consent to communicate   dated 2014     Labs at [facility].   Phone # [phone]  Fax # [phone]    Aranesp at [facility]  Phone # [phone]      Scheduling line for pts is: [phone]    Anemia Latest Ref Rng & Units 2011 2011 2011 2019 2019 2019 10/4/2019   Hemoglobin 13.3 - 17.7 g/dL 11.6(L) 11.9 12.9 9.6(A) - 9.3(A) 9.1(A)   Ferritin 26 - 388 ng/mL - - - - 62 - -     BP Readings from Last 3 Encounters:   19 124/70   18 126/71   17 113/69     Wt Readings from Last 2 Encounters:   19 114.1 kg (251 lb 8 oz)   18 115.8 kg (255 lb 3.2 oz)       Hussain  Went to [facility] to have his labs drawn, and they did not have the Anemia Labs on file.  They were faxed on 2019 to an incorrect fax #.  Will re fax labs today and follow up with Hussain.    Iron and Ferritin levels need to be drawn before his insurance will approve his Aranesp.           PLAN:  Call Hussain to inform him that labs have been faxed to [facility].         NEXT FOLLOW-UP DATE:  10/31/2019    Anemia Management Service  Liudmila Levi RN  Phone: [phone]  Fax: [phone]

## 2019-11-04 LAB
EBV DNA # SPEC NAA+PROBE: NORMAL {COPIES}/ML
EBV DNA SPEC NAA+PROBE-LOG#: NORMAL {LOG_COPIES}/ML

## 2019-11-05 ENCOUNTER — TELEPHONE (OUTPATIENT)
Dept: PHARMACY | Facility: CLINIC | Age: 24
End: 2019-11-05

## 2019-11-05 DIAGNOSIS — Z94.0 KIDNEY REPLACED BY TRANSPLANT: ICD-10-CM

## 2019-11-05 DIAGNOSIS — N18.30 ANEMIA OF CHRONIC RENAL FAILURE, STAGE 3 (MODERATE) (H): ICD-10-CM

## 2019-11-05 DIAGNOSIS — N18.30 CKD (CHRONIC KIDNEY DISEASE) STAGE 3, GFR 30-59 ML/MIN (H): ICD-10-CM

## 2019-11-05 DIAGNOSIS — D63.1 ANEMIA OF CHRONIC RENAL FAILURE, STAGE 3 (MODERATE) (H): ICD-10-CM

## 2019-11-05 LAB
FERRITIN SERPL-MCNC: 53 NG/ML (ref 26–388)
HCT VFR BLD AUTO: 31.5 % (ref 40–53)
HGB BLD-MCNC: 9.5 G/DL (ref 13.3–17.7)
IRON SATN MFR SERPL: 7 % (ref 15–46)
IRON SERPL-MCNC: 20 UG/DL (ref 35–180)
TIBC SERPL-MCNC: 275 UG/DL (ref 240–430)

## 2019-11-05 PROCEDURE — 36415 COLL VENOUS BLD VENIPUNCTURE: CPT | Performed by: INTERNAL MEDICINE

## 2019-11-05 PROCEDURE — 82728 ASSAY OF FERRITIN: CPT | Performed by: INTERNAL MEDICINE

## 2019-11-05 PROCEDURE — 85018 HEMOGLOBIN: CPT | Performed by: INTERNAL MEDICINE

## 2019-11-05 PROCEDURE — 83540 ASSAY OF IRON: CPT | Performed by: INTERNAL MEDICINE

## 2019-11-05 PROCEDURE — 85014 HEMATOCRIT: CPT | Performed by: INTERNAL MEDICINE

## 2019-11-05 PROCEDURE — 83550 IRON BINDING TEST: CPT | Performed by: INTERNAL MEDICINE

## 2019-11-05 NOTE — TELEPHONE ENCOUNTER
Follow-up with anemia management service:    Labs were drawn today. Hgb is at 9.5.  Iron and Ferritin levels are pending. Notified Clinic ([facility]).  Waiting for approval from insurance before he can get the Aranesp injection.     Anemia Latest Ref Rng & Units 8/27/2011 11/4/2011 7/12/2019 8/26/2019 9/18/2019 10/4/2019 11/5/2019   Hemoglobin 13.3 - 17.7 g/dL 11.9 12.9 9.6(A) - 9.3(A) 9.1(A) 9.5(L)   Ferritin 26 - 388 ng/mL - - - 62 - - -           Follow-up call date: 11/12/2019  Has the PA been approved?        Anemia Management Service  Liudmila Levi RN  Phone: [phone]  Fax: [phone]

## 2019-11-06 ENCOUNTER — TRANSFERRED RECORDS (OUTPATIENT)
Dept: HEALTH INFORMATION MANAGEMENT | Facility: CLINIC | Age: 24
End: 2019-11-06

## 2019-11-09 ENCOUNTER — HEALTH MAINTENANCE LETTER (OUTPATIENT)
Age: 24
End: 2019-11-09

## 2019-11-12 ENCOUNTER — TELEPHONE (OUTPATIENT)
Dept: TRANSPLANT | Facility: CLINIC | Age: 24
End: 2019-11-12

## 2019-11-12 DIAGNOSIS — Z94.0 KIDNEY REPLACED BY TRANSPLANT: ICD-10-CM

## 2019-11-12 DIAGNOSIS — D47.Z1 PTLD (POST-TRANSPLANT LYMPHOPROLIFERATIVE DISORDER) (H): Primary | ICD-10-CM

## 2019-11-12 DIAGNOSIS — I15.1 HYPERTENSION SECONDARY TO OTHER RENAL DISORDERS: ICD-10-CM

## 2019-11-12 DIAGNOSIS — D84.9 IMMUNOSUPPRESSION (H): ICD-10-CM

## 2019-11-12 RX ORDER — PREDNISONE 10 MG/1
10 TABLET ORAL DAILY
Qty: 30 TABLET | Refills: 3 | Status: SHIPPED | OUTPATIENT
Start: 2019-11-12 | End: 2020-03-30

## 2019-11-12 RX ORDER — MYCOPHENOLATE MOFETIL 250 MG/1
500 CAPSULE ORAL EVERY 12 HOURS
Qty: 120 CAPSULE | Refills: 11 | Status: SHIPPED | OUTPATIENT
Start: 2019-11-12 | End: 2020-03-23 | Stop reason: ALTCHOICE

## 2019-11-12 NOTE — TELEPHONE ENCOUNTER
Provider Call: General    Reason for call: Please connect with the provider for a care update     Call back needed? Yes    Return Call Needed  Same as documented in contacts section  When to return call?: Same day: [facility]

## 2019-11-12 NOTE — TELEPHONE ENCOUNTER
Call placed to Dr Rose. 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  He is wondering what to do with pt IS.  Dr Dumont spoke with him regarding IS plan.  Call placed to pt to review IS plan.  Pt v/u of above plan     ISSUE:  Call placed to Dr Rose. 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  He is wondering what to do with pt IS.  Dr Dumont spoke with him regarding IS plan.    PLAN:   Per Dr Dumont:  -Starting today, pt to half MMF dose to 500 mg BID and cont on current CSA dose.    -Once pt starts R-Chop (likely starting next week), pt to STOP MMF and CSA.  Pt to START Pred 10 mg daily and cont on during therapy.    -Pt to f/u in clinic as his treatment is nearing completion.  Pt will call when he has 1 month left of treatment to get scheduled for f/u appointment.     OUTCOME:   Call placed to pt regarding above plan.  Pt v/u and aware of dose changes and when to make them.  Pt v/u to call when he has a date for his R-Chop start date.      Pt questions answered, pt v/u.

## 2019-11-13 ENCOUNTER — TELEPHONE (OUTPATIENT)
Dept: PHARMACY | Facility: CLINIC | Age: 24
End: 2019-11-13

## 2019-11-13 NOTE — TELEPHONE ENCOUNTER
Referred by Dr. 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  He will be receiving R-CHOP. All Aranesp/Lab appts through [facility] will be canceled.     Spoke with Payal MARTIN at [facility] in [place]. Oncology will be managing Anemia going forward.     Anemia Services Closed.     Anemia Latest Ref Rng & Units 8/27/2011 11/4/2011 7/12/2019 8/26/2019 9/18/2019 10/4/2019 11/5/2019   Hemoglobin 13.3 - 17.7 g/dL 11.9 12.9 9.6(A) - 9.3(A) 9.1(A) 9.5(L)   TSAT 15 - 46 % - - - - - - 7(L)   Ferritin 26 - 388 ng/mL - - - 62 - - 53       Anemia labs discontinued, therapy plans canceled, removed from active patient list. Dr. Dumont and Dr. Vernon are both aware.     Liudmila Levi RN   Anemia Services  [facility]  [address] [place] [address]   [email or web address]   Office : [phone]  Fax: [phone]

## 2019-11-15 ENCOUNTER — TRANSFERRED RECORDS (OUTPATIENT)
Dept: HEALTH INFORMATION MANAGEMENT | Facility: CLINIC | Age: 24
End: 2019-11-15

## 2019-11-16 ENCOUNTER — TELEPHONE (OUTPATIENT)
Dept: TRANSPLANT | Facility: CLINIC | Age: 24
End: 2019-11-16

## 2019-11-16 NOTE — TELEPHONE ENCOUNTER
Returned call to Oriana ([facility] Pharmacist) regarding Hussain's immunosuppression. She reported that Hussain was currently inpatient at [facility] with a SBO. Physician there wanting to switch immunosuppression to IV. Oriana stated stated that they have in stock IV MMF but do not have IV Cyclosporine. Wanting recommendations. This writer called adult Renal Fellow on call and discussed plan. Per Dr. Loaiza; ok for IV MMF; continue cyclosporine po, check levels frequently, and adjust dose as needed. Instructed Oriana of the above plan and she verbalized understanding.

## 2019-11-18 PROCEDURE — 80158 DRUG ASSAY CYCLOSPORINE: CPT | Performed by: INTERNAL MEDICINE

## 2019-11-19 ENCOUNTER — TELEPHONE (OUTPATIENT)
Dept: TRANSPLANT | Facility: CLINIC | Age: 24
End: 2019-11-19

## 2019-11-19 DIAGNOSIS — Z94.0 KIDNEY REPLACED BY TRANSPLANT: Primary | ICD-10-CM

## 2019-11-19 DIAGNOSIS — Z48.298 AFTERCARE FOLLOWING ORGAN TRANSPLANT: ICD-10-CM

## 2019-11-19 DIAGNOSIS — Z79.899 ENCOUNTER FOR LONG-TERM CURRENT USE OF MEDICATION: ICD-10-CM

## 2019-11-19 LAB
CYCLOSPORINE BLD LC/MS/MS-MCNC: 47 UG/L (ref 50–400)
TME LAST DOSE: ABNORMAL H

## 2019-11-19 NOTE — TELEPHONE ENCOUNTER
Called Hussain to check in on current health status.   Hussain is still inpatient at [facility] with a SBO. Hussain stated that they have started his chemo while inpatient. His MMF and CSA were stopped and he is on prednisone 10 mg daily (per plan in note written 11/12/19). Hussain does not know the duration of his current chemo regimen, nor how long he will be inpatient. Hussain states he will speak with his oncologist to find out the exact plan. He states he will call the transplant office back after speaking to his oncologist.

## 2019-12-27 ENCOUNTER — TELEPHONE (OUTPATIENT)
Dept: TRANSPLANT | Facility: CLINIC | Age: 24
End: 2019-12-27

## 2019-12-27 ENCOUNTER — TRANSFERRED RECORDS (OUTPATIENT)
Dept: HEALTH INFORMATION MANAGEMENT | Facility: CLINIC | Age: 24
End: 2019-12-27

## 2019-12-27 NOTE — TELEPHONE ENCOUNTER
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

## 2019-12-27 NOTE — LETTER
PHYSICIAN ORDERS      DATE & TIME ISSUED: 2019 10:35 AM  PATIENT NAME: Hussain Steven   : 1995     [facility] MR# [if applicable]: [id number]     DIAGNOSIS / ICD-10 CODE:    Kidney Transplanted (Z94.0),   Elevated Creatinine (R79.89),   Immunosuppressed Status (D89.9)     Please draw the following post-transplant labs once this week 19:     -BMP  -CBC w/platelets  -Cyclosporine level (12 hr trough-record last dose date and time)      Any questions please call: [phone]. Fax results to [phone].        .

## 2019-12-30 NOTE — TELEPHONE ENCOUNTER
Message   Received: Yesterday   Message Contents   James Dumont MD [place], Juliet Farias RN             Elevated serum creatinine and recommend good hydration and will recheck labs.

## 2019-12-30 NOTE — TELEPHONE ENCOUNTER
OUTCOME: RNCC spoke with Hussain regarding the increase in Creatinine. He is currently undergoing R-CHOP infusion. Feels as though he is dehydrated. Plans to hydrate and repeat labs later this week.     Patient Preferred Lab: Orders Faxed  [facility] [phone] (Phone)  [phone] (Fax)

## 2020-01-06 ENCOUNTER — TELEPHONE (OUTPATIENT)
Dept: TRANSPLANT | Facility: CLINIC | Age: 25
End: 2020-01-06

## 2020-02-12 ENCOUNTER — TRANSFERRED RECORDS (OUTPATIENT)
Dept: HEALTH INFORMATION MANAGEMENT | Facility: CLINIC | Age: 25
End: 2020-02-12

## 2020-03-03 ENCOUNTER — TELEPHONE (OUTPATIENT)
Dept: TRANSPLANT | Facility: CLINIC | Age: 25
End: 2020-03-03

## 2020-03-03 NOTE — TELEPHONE ENCOUNTER
ISSUE:  -urine pr/cr 0.4 (previously 0.1)    PLAN:  -Called pt, how is he feeling? How is blood pressure control? Any recent changes in diet or exercise?  -advise repeat with lab in 2 weeks     OUTCOME:  -Message left for pt with number to transplant office  -Lab order sent

## 2020-03-03 NOTE — LETTER
PHYSICIAN ORDERS      DATE & TIME ISSUED: March 3, 2020 2:01 PM  PATIENT NAME: Hussain Steven   : 1995     [facility] MR# [if applicable]: [id number]     DIAGNOSIS:  Kidney Transplant   ICD-10 CODE: Z94.0     Please complete the following lab in 1-2 weeks:  -Protein random urine with creatinine ratio    Any questions please call: [phone], option 5    Please fax these results to [phone]    .

## 2020-03-23 ENCOUNTER — TELEPHONE (OUTPATIENT)
Dept: TRANSPLANT | Facility: CLINIC | Age: 25
End: 2020-03-23

## 2020-03-23 DIAGNOSIS — Z94.0 KIDNEY TRANSPLANTED: Primary | ICD-10-CM

## 2020-03-23 RX ORDER — EVEROLIMUS 0.75 MG/1
0.75 TABLET ORAL 2 TIMES DAILY
Qty: 180 TABLET | Refills: 3 | Status: SHIPPED | OUTPATIENT
Start: 2020-03-23 | End: 2020-04-08

## 2020-03-23 RX ORDER — EVEROLIMUS 0.5 MG/1
0.5 TABLET ORAL 2 TIMES DAILY
Qty: 180 TABLET | Refills: 3 | Status: SHIPPED | OUTPATIENT
Start: 2020-03-23 | End: 2020-04-08

## 2020-03-23 NOTE — LETTER
PHYSICIAN ORDERS      DATE & TIME ISSUED: 2020 2:24 PM  PATIENT NAME: Hussain Steven   : 1995     [facility] MR# [if applicable]: [id number]     DIAGNOSIS:  Kidney Transplant   ICD-10 CODE: Z94.0     To [facility],  For patient Hussain Steven please collect the following labs:   -BMP twice a week   -CBC twice a week   -Everolimus drug level (12 hour trough) twice a week   -Liver function panel-now and every 6 months   -Lipid panel-now and every 6 months   -Urine protein/creatinine ratio-now and every 6 months      Any questions please call: [phone].

## 2020-03-23 NOTE — LETTER
PHYSICIAN ORDERS      DATE & TIME ISSUED: 2020 2:24 PM  PATIENT NAME: Hussain Steven   : 1995     [facility] MR# [if applicable]: [id number]     DIAGNOSIS:  Kidney Transplant   ICD-10 CODE: Z94.0     To [facility],  For patient Hussain Steven please collect the following labs:   -BMP twice a week   -CBC twice a week   -Everolimus drug level (12 hour trough) twice a week   -Liver function panel-now and every 6 months   -Lipid panel-now and every 6 months   -Urine protein/creatinine ratio-now and every 6 months      Any questions please call: [phone].

## 2020-03-23 NOTE — TELEPHONE ENCOUNTER
Per Dr. Marshall note:     Hussain is finished with the R-Chop. I would start everolimus 1.25 mg po bid and  check twice weekly levels. Goal is 4-6 ng / ml. Continue pred 10 mg daily until  therapeutic. Once therapeutic, can decrease pred to 5 mg daily.      Check lfts, upc per routine with mTOR initiation.     Outcome:  I spoke with Hussain regarding above plan. Hussain agreeable to starting Everolimus 1.25 mg BID and continuing Prednisone 10 mg daily until Everolimus is therapeutic (4-6). Medication sent to preferred pharmacy and lab orders sent.

## 2020-03-27 ENCOUNTER — TRANSFERRED RECORDS (OUTPATIENT)
Dept: HEALTH INFORMATION MANAGEMENT | Facility: CLINIC | Age: 25
End: 2020-03-27

## 2020-03-30 DIAGNOSIS — D47.Z1 PTLD (POST-TRANSPLANT LYMPHOPROLIFERATIVE DISORDER) (H): ICD-10-CM

## 2020-03-30 DIAGNOSIS — Z94.0 KIDNEY REPLACED BY TRANSPLANT: Primary | ICD-10-CM

## 2020-03-30 DIAGNOSIS — D84.9 IMMUNOSUPPRESSION (H): ICD-10-CM

## 2020-03-30 RX ORDER — PREDNISONE 10 MG/1
10 TABLET ORAL DAILY
Qty: 30 TABLET | Refills: 11 | Status: SHIPPED | OUTPATIENT
Start: 2020-03-30 | End: 2020-05-04 | Stop reason: ALTCHOICE

## 2020-04-01 ENCOUNTER — TELEPHONE (OUTPATIENT)
Dept: TRANSPLANT | Facility: CLINIC | Age: 25
End: 2020-04-01

## 2020-04-01 NOTE — TELEPHONE ENCOUNTER
Called Hussain to see if he had labs drawn to follow his Everolimus level since starting the medication on 3/23. Per the plan he should be having twice weekly labs.  No answer. I left a voice message reminding Hussain to have his labs drawn. Requested a call back to verbalize understanding.

## 2020-04-02 ENCOUNTER — TELEPHONE (OUTPATIENT)
Dept: TRANSPLANT | Facility: CLINIC | Age: 25
End: 2020-04-02

## 2020-04-02 NOTE — TELEPHONE ENCOUNTER
Called Hussain to make sure he was taking his Everolimus 1.25 mg BID as ordered (new to him as of 3/23/20) and that he was going to have labs drawn.  Hussain confirmed dose of Everolimus. He reports he feels well and has a lab appointment for tomorrow.

## 2020-04-03 PROCEDURE — 80169 DRUG ASSAY EVEROLIMUS: CPT | Performed by: INTERNAL MEDICINE

## 2020-04-06 ENCOUNTER — TELEPHONE (OUTPATIENT)
Dept: TRANSPLANT | Facility: CLINIC | Age: 25
End: 2020-04-06

## 2020-04-06 NOTE — TELEPHONE ENCOUNTER
Notes recorded by Maura Bustillos, RN on 4/6/2020 at 12:41 PM CDT   Hi Dr. Dumont, Hussain's WBC is 1.7, ANC 0.1. He finished R-CHOP and started Everolimus 1.25 mg BID on 3/23/20 (level is pending) with prednisone 10 mg daily. He takes Bactrim twice weekly (due to low WBC in the past.   Recommendations?   Barbara Johnson RN ,RNCC     James Dumont MD [place], Kathleen, VERONICA               This should be managed by Oncology as it is likely a result from chemotherapy.        Outcome:  I called Hussain to notify him of low WBC (1.7) and ANC (0.5). I advised Hussain to contact his oncologist regarding these results. Hussain stated he has been in touch with his oncologist recently and will call him with these lab results.   I educated Hussain to be diligent with hand hygiene, avoiding crowds and sick contacts-he verbalized understanding.    Waiting on an Everolimus level that was drawn on Friday (in process per the lab).

## 2020-04-07 DIAGNOSIS — Z94.0 KIDNEY REPLACED BY TRANSPLANT: Primary | ICD-10-CM

## 2020-04-07 DIAGNOSIS — Z79.899 ENCOUNTER FOR LONG-TERM CURRENT USE OF MEDICATION: ICD-10-CM

## 2020-04-07 DIAGNOSIS — Z48.298 AFTERCARE FOLLOWING ORGAN TRANSPLANT: ICD-10-CM

## 2020-04-08 ENCOUNTER — TELEPHONE (OUTPATIENT)
Dept: TRANSPLANT | Facility: CLINIC | Age: 25
End: 2020-04-08

## 2020-04-08 DIAGNOSIS — Z94.0 KIDNEY TRANSPLANTED: ICD-10-CM

## 2020-04-08 LAB
EVEROLIMUS BLD-MCNC: 2.5 UG/L (ref 3–8)
TME LAST DOSE: ABNORMAL H

## 2020-04-08 RX ORDER — EVEROLIMUS 0.5 MG/1
TABLET ORAL
Qty: 180 TABLET | Refills: 3 | Status: SHIPPED | OUTPATIENT
Start: 2020-04-08 | End: 2020-04-20

## 2020-04-08 RX ORDER — EVEROLIMUS 0.75 MG/1
TABLET ORAL
Qty: 360 TABLET | Refills: 3 | Status: SHIPPED | OUTPATIENT
Start: 2020-04-08 | End: 2020-04-20

## 2020-04-08 NOTE — TELEPHONE ENCOUNTER
ISSUE:   Everolimus level 2.5 on 4/3/20, goal 4-6, dose 1.25 mg BID.    PLAN:   Please call patient and confirm this was an accurate 12-hour trough. Verify Everolimus dose 1.25 mg BID. Confirm no new medications or illness. Confirm no missed doses. If accurate trough and accurate dose, increase Everolimus dose to 1.5 mg BID and repeat labs in 1 week.    OUTCOME:   Left a message with Hussain verifying a good 12 hour trough, and current dose of 1.25 mg BID-if so INCREASE Everolimus to 1.5 mg twice a day and have labs drawn in 1 week.   Requested a call back to verbalize understanding.

## 2020-04-09 NOTE — TELEPHONE ENCOUNTER
Called Hussain to confirm that he received a message about a dose change of his Everolimus. Hussain stated he did get the message and has increased his Everolimus to 1.5 mg BID and will have labs drawn next week.  He reports he has not contacted his oncologist about his WBC from his last labs. He would like to see his set of labs from next week first.

## 2020-04-15 DIAGNOSIS — Z79.899 ENCOUNTER FOR LONG-TERM CURRENT USE OF MEDICATION: ICD-10-CM

## 2020-04-15 DIAGNOSIS — Z48.298 AFTERCARE FOLLOWING ORGAN TRANSPLANT: ICD-10-CM

## 2020-04-15 DIAGNOSIS — Z94.0 KIDNEY REPLACED BY TRANSPLANT: ICD-10-CM

## 2020-04-15 PROCEDURE — 80169 DRUG ASSAY EVEROLIMUS: CPT | Performed by: INTERNAL MEDICINE

## 2020-04-20 ENCOUNTER — TELEPHONE (OUTPATIENT)
Dept: TRANSPLANT | Facility: CLINIC | Age: 25
End: 2020-04-20

## 2020-04-20 DIAGNOSIS — Z94.0 KIDNEY TRANSPLANTED: ICD-10-CM

## 2020-04-20 LAB
EVEROLIMUS BLD-MCNC: 3.5 UG/L (ref 3–8)
TME LAST DOSE: NORMAL H

## 2020-04-20 RX ORDER — EVEROLIMUS 0.5 MG/1
1 TABLET ORAL 2 TIMES DAILY
Qty: 360 TABLET | Refills: 3 | Status: SHIPPED | OUTPATIENT
Start: 2020-04-20 | End: 2021-05-20

## 2020-04-20 RX ORDER — EVEROLIMUS 0.75 MG/1
0.75 TABLET ORAL 2 TIMES DAILY
Qty: 180 TABLET | Refills: 3 | Status: SHIPPED | OUTPATIENT
Start: 2020-04-20 | End: 2021-05-20

## 2020-04-20 NOTE — TELEPHONE ENCOUNTER
ISSUE:   Everolimus level 3.5 on 4/15/20, goal 4-6, dose 1.5 mg BID.    PLAN:   Please call patient and confirm this was an accurate 12-hour trough. Verify Everolimus dose 1.5 mg BID. Confirm no new medications or illness. Confirm no missed doses. If accurate trough and accurate dose, increase Everolimus dose to 1.75 mg BID and repeat labs in 1 week.    OUTCOME:   Spoke with patient, they confirm accurate trough level and current dose 1.5 mg BID. Patient confirmed dose change to 1.75 mg BID and to repeat labs in 1 week. Orders sent to [facility] pharmacy for dose change and lab for repeat labs. Patient voiced understanding of plan.     Hussain confirmed that he is still taking his Prednisone 10 mg daily. If next weeks trough is therapeutic Prednisone can be decrease to 5 mg daily and we can decrease lab frequency.

## 2020-04-28 DIAGNOSIS — Z94.0 KIDNEY REPLACED BY TRANSPLANT: ICD-10-CM

## 2020-04-28 DIAGNOSIS — Z48.298 AFTERCARE FOLLOWING ORGAN TRANSPLANT: ICD-10-CM

## 2020-04-28 DIAGNOSIS — Z79.899 ENCOUNTER FOR LONG-TERM CURRENT USE OF MEDICATION: ICD-10-CM

## 2020-04-28 PROCEDURE — 80169 DRUG ASSAY EVEROLIMUS: CPT | Performed by: INTERNAL MEDICINE

## 2020-05-01 LAB
EVEROLIMUS BLD-MCNC: 4 UG/L (ref 3–8)
TME LAST DOSE: NORMAL H

## 2020-05-04 ENCOUNTER — TELEPHONE (OUTPATIENT)
Dept: TRANSPLANT | Facility: CLINIC | Age: 25
End: 2020-05-04

## 2020-05-04 DIAGNOSIS — Z94.0 KIDNEY TRANSPLANTED: Primary | ICD-10-CM

## 2020-05-04 RX ORDER — PREDNISONE 5 MG/1
5 TABLET ORAL DAILY
Qty: 90 TABLET | Refills: 3 | Status: SHIPPED | OUTPATIENT
Start: 2020-05-04 | End: 2021-05-20

## 2020-05-04 NOTE — TELEPHONE ENCOUNTER
Issue:  Everolimus 4.0-in goal range of 4-6 on dose of 1.75 mg BID.  Per plan, OK to down decrease Prednisone from 10 mg daily, to 5 mg daily, and go to monthly labs.    Outcome;  Called Hussain to update him on Everolimus and prednisone. Hussain verbalized understanding to continue on Everolimus 1.75 mg BID, to decrease prednisone to 5 mg daily, and to have monthly labs done.  Prednisone prescription sent to [facility] pharmacy.

## 2020-05-29 DIAGNOSIS — Z48.298 AFTERCARE FOLLOWING ORGAN TRANSPLANT: ICD-10-CM

## 2020-05-29 DIAGNOSIS — Z79.899 ENCOUNTER FOR LONG-TERM CURRENT USE OF MEDICATION: ICD-10-CM

## 2020-05-29 DIAGNOSIS — Z94.0 KIDNEY REPLACED BY TRANSPLANT: ICD-10-CM

## 2020-05-29 PROCEDURE — 80169 DRUG ASSAY EVEROLIMUS: CPT | Performed by: INTERNAL MEDICINE

## 2020-06-05 LAB
EVEROLIMUS BLD-MCNC: 5.5 UG/L (ref 3–8)
TME LAST DOSE: NORMAL H

## 2020-07-01 ENCOUNTER — TELEPHONE (OUTPATIENT)
Dept: TRANSPLANT | Facility: CLINIC | Age: 25
End: 2020-07-01

## 2020-07-01 NOTE — TELEPHONE ENCOUNTER
Issue:  Received a CBC and Iron studies from lab, but no creatinine and no Everolimus level.    Outcome:  Called [facility] lab.  states Hussain had a CMP drawn. I requested the results be faxed here.  states a drug level was not drawn.  Called Hussain, no answer. Left a voice message requesting Hussain go to lab within the week to have a Everolimus level drawn.

## 2020-07-09 DIAGNOSIS — Z48.298 AFTERCARE FOLLOWING ORGAN TRANSPLANT: ICD-10-CM

## 2020-07-09 DIAGNOSIS — Z94.0 KIDNEY REPLACED BY TRANSPLANT: ICD-10-CM

## 2020-07-09 DIAGNOSIS — Z79.899 ENCOUNTER FOR LONG-TERM CURRENT USE OF MEDICATION: ICD-10-CM

## 2020-07-09 PROCEDURE — 80169 DRUG ASSAY EVEROLIMUS: CPT | Performed by: INTERNAL MEDICINE

## 2020-07-13 ENCOUNTER — TELEPHONE (OUTPATIENT)
Dept: TRANSPLANT | Facility: CLINIC | Age: 25
End: 2020-07-13

## 2020-07-13 LAB
EVEROLIMUS BLD-MCNC: 4.2 UG/L (ref 3–8)
TME LAST DOSE: NORMAL H

## 2020-07-13 NOTE — TELEPHONE ENCOUNTER
Issue:  Received a CBC and BMP, but no drug level.    Outcome:  Per [facility] lab a drug level was drawn and mailed out. Will await results.

## 2020-08-04 ENCOUNTER — TELEPHONE (OUTPATIENT)
Dept: TRANSPLANT | Facility: CLINIC | Age: 25
End: 2020-08-04

## 2020-08-04 NOTE — TELEPHONE ENCOUNTER
Prior Authorization Specialty Medication Request    Medication/Dose:   everolimus (ZORTRESS) 0.5 MG tablet  Take 2 tablets (1 mg) by mouth 2 times daily Total dose is 1.75 mg twice a day.    everolimus (ZORTRESS) 0.75 MG tablet  Take 1 tablet (0.75 mg) by mouth 2 times daily Total dose is 1.75 mg twice a day      ICD code (if different than what is on RX):  Z94.0  Previously Tried and Failed:      Important Lab Values:   Rationale:     Insurance Name: [facility]  Insurance ID: [id number]  Insurance Phone Number:     Pharmacy Information (if different than what is on RX)  Name:  [facility]  Phone:

## 2020-08-06 NOTE — TELEPHONE ENCOUNTER
Prior Authorization Not Needed per Insurance    Medication: Zortress  Insurance Company: [employer] [place]     Expected CoPay:  [facility]    Pharmacy Filling the Rx:  [facility]   Pharmacy Notified:  Yes  Patient Notified:  No     RX was filled by [facility]

## 2020-08-18 DIAGNOSIS — Z94.0 KIDNEY REPLACED BY TRANSPLANT: ICD-10-CM

## 2020-08-18 DIAGNOSIS — Z48.298 AFTERCARE FOLLOWING ORGAN TRANSPLANT: ICD-10-CM

## 2020-08-18 DIAGNOSIS — Z79.899 ENCOUNTER FOR LONG-TERM CURRENT USE OF MEDICATION: ICD-10-CM

## 2020-08-18 PROCEDURE — 80169 DRUG ASSAY EVEROLIMUS: CPT | Performed by: INTERNAL MEDICINE

## 2020-08-21 LAB
EVEROLIMUS BLD-MCNC: 4.3 UG/L (ref 3–8)
TME LAST DOSE: NORMAL H

## 2020-08-24 ENCOUNTER — VIRTUAL VISIT (OUTPATIENT)
Dept: NEPHROLOGY | Facility: CLINIC | Age: 25
End: 2020-08-24
Attending: INTERNAL MEDICINE
Payer: COMMERCIAL

## 2020-08-24 VITALS
WEIGHT: 249.3 LBS | BODY MASS INDEX: 32.01 KG/M2 | SYSTOLIC BLOOD PRESSURE: 127 MMHG | DIASTOLIC BLOOD PRESSURE: 72 MMHG | TEMPERATURE: 98.4 F

## 2020-08-24 DIAGNOSIS — D63.1 ANEMIA OF CHRONIC RENAL FAILURE, STAGE 3 (MODERATE) (H): ICD-10-CM

## 2020-08-24 DIAGNOSIS — I15.1 HTN, KIDNEY TRANSPLANT RELATED: ICD-10-CM

## 2020-08-24 DIAGNOSIS — Z29.89 NEED FOR PNEUMOCYSTIS PROPHYLAXIS: ICD-10-CM

## 2020-08-24 DIAGNOSIS — D47.Z1 PTLD AFTER KIDNEY TRANSPLANTATION (H): ICD-10-CM

## 2020-08-24 DIAGNOSIS — T86.19 PTLD AFTER KIDNEY TRANSPLANTATION (H): ICD-10-CM

## 2020-08-24 DIAGNOSIS — N18.30 ANEMIA OF CHRONIC RENAL FAILURE, STAGE 3 (MODERATE) (H): ICD-10-CM

## 2020-08-24 DIAGNOSIS — Z94.0 HTN, KIDNEY TRANSPLANT RELATED: ICD-10-CM

## 2020-08-24 DIAGNOSIS — Z94.0 KIDNEY REPLACED BY TRANSPLANT: Primary | ICD-10-CM

## 2020-08-24 DIAGNOSIS — D84.9 IMMUNOSUPPRESSION (H): ICD-10-CM

## 2020-08-24 DIAGNOSIS — Z48.298 AFTERCARE FOLLOWING ORGAN TRANSPLANT: ICD-10-CM

## 2020-08-24 RX ORDER — FAMOTIDINE 20 MG/1
20 TABLET, FILM COATED ORAL 2 TIMES DAILY
COMMUNITY
Start: 2019-12-02

## 2020-08-24 ASSESSMENT — PAIN SCALES - GENERAL: PAINLEVEL: NO PAIN (0)

## 2020-08-24 NOTE — PROGRESS NOTES
"Hussain Steven is a 25 year old male who is being evaluated via a billable video visit.      The patient has been notified of following:     \"This video visit will be conducted via a call between you and your physician/provider. We have found that certain health care needs can be provided without the need for an in-person physical exam.  This service lets us provide the care you need with a video conversation.  If a prescription is necessary we can send it directly to your pharmacy.  If lab work is needed we can place an order for that and you can then stop by our lab to have the test done at a later time.    Video visits are billed at different rates depending on your insurance coverage.  Please reach out to your insurance provider with any questions.    If during the course of the call the physician/provider feels a video visit is not appropriate, you will not be charged for this service.\"    Patient has given verbal consent for Video visit? Yes  How would you like to obtain your AVS? MyChart  Will anyone else be joining your video visit? No        Video-Visit Details    Type of service:  Video Visit    Video Start Time: 1:55  Video End Time: 2:27 PM    Originating Location (pt. Location): Home    Distant Location (provider location):  [facility] NEPHROLOGY     Platform used for Video Visit: Taylor Em MD        "

## 2020-08-24 NOTE — LETTER
"8/24/2020       RE: Hussain Steven  [address]  [place] [address]     Dear Colleague,    Thank you for referring your patient, Hussain Steven, to the [facility] NEPHROLOG[facility] at [facility] Please see a copy of my visit note below.    Hussain Steven is a 25 year old male who is being evaluated via a billable video visit.      The patient has been notified of following:     \"This video visit will be conducted via a call between you and your physician/provider. We have found that certain health care needs can be provided without the need for an in-person physical exam.  This service lets us provide the care you need with a video conversation.  If a prescription is necessary we can send it directly to your pharmacy.  If lab work is needed we can place an order for that and you can then stop by our lab to have the test done at a later time.    Video visits are billed at different rates depending on your insurance coverage.  Please reach out to your insurance provider with any questions.    If during the course of the call the physician/provider feels a video visit is not appropriate, you will not be charged for this service.\"    Patient has given verbal consent for Video visit? Yes  How would you like to obtain your AVS? MyChart  Will anyone else be joining your video visit? No        Video-Visit Details    Type of service:  Video Visit    Video Start Time: 1:55  Video End Time: 2:27 PM    Originating Location (pt. Location): Home    Distant Location (provider location):  [facility] NEPHROLOGY     Platform used for Video Visit: Taylor Em MD          CHRONIC TRANSPLANT NEPHROLOGY VISIT    Assessment & Plan   # LDKT: Stable   - Baseline Cr ~ 1.6-1.9   - Proteinuria: Normal (<0.2 grams), UA showing 100mg/dL albuminuria   - Date DSA Last Checked: Feb/2016       Latest DSA: No   - BK Viremia: Not checked recently due to time from transplant   - Kidney Tx Biopsy: Aug 26, " 2011; Result: No diagnostic evidence of acute rejection. Unremarkable.    -Recommend checking DSA with Scr increases    # Immunosuppression: Everolimus (goal 4-6)  and Prednisone (5mg)   - Changes: No, but since last visit, CsA and MMF stopped due to PTLD    # Prophylaxis:   - PJP: Sulfa/TMP (Bactrim)    # Hypertension: Controlled; Goal BP: < 130/80   - Changes: No    # Anemia in Chronic Renal Disease: Hgb: Stable  MELIA: No   - Iron studies: Not checked recently    # Mineral Bone Disorder:   - Secondary renal hyperparathyroidism; PTH level: Normal (18-80 pg/ml)  - Vitamin D; level: Normal  - Calcium; level: High normal    # Electrolytes:   - Potassium; level: Normal  - Bicarbonate; level: Normal    # PTLD: Stage IV DLBCL, s/p R-CHOP w/ 6 cycles of therapy, now on mTORi + predisone. C/b neutropenic fevers after round 1 R-CHOP. PET scan showing shrinkage of mass but persistent uptake in 5.5cm mass in LLQ, partial response noted. S/p consolidative radiotherapy to the residual mass in the LLQ, 30Gy over 15 fractions.     # Skin Cancer Risk:    - Discussed sun protection and recommend regular follow up with Dermatology.    # Medical Compliance: Yes    # Transplant History:  Etiology of kidney failure: congenital solitary kidney  Tx: LDKT  Transplant: 2/3/2009 (Kidney)  Donor Type: Living Donor Class: Standard Criteria Donor  Significant changes in immunosuppression: None  Significant transplant-related complications: None    Transplant Office Phone Number: [phone]    Assessment and plan was discussed with the patient and he voiced his understanding and agreement.    Return visit: Return in about 6 months (around 2/24/2021).     Physician Attestation   I, Sander Love MD, personally examined and evaluated this patient.  I discussed the patient with the resident/fellow and care team, and agree with the assessment and plan of care as documented in the note on 08/24/20 .      I personally reviewed vital signs,  medications, labs and imaging.    Key findings: 25yM w/ PMH of congenital solitary kidney s/p LDKT 2/2009 complicated by Stage IV DLBCL with mass in RLQ s/p 6 cycles of R-CHOP as well as localized radiation to RLQ mass, now on everolimus and prednisone 5mg daily presenting for follow up. The patient's weight is now stable.. He follows with an oncologist at an outside hospital and I have read his notes. There is a concern that he may continue to have a mass in the RLQ from the radiation. Plan for repeat PET-CT 12/2020.   Sander Love MD  Date of Service (when I saw the patient): 08/24/20          Chief Complaint   Mr. Steven is a 25 year old here for routine follow up.    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 He was last seen in clinic by Dr. Vernon on 8/9/18; please see that note for further details.     Since his last visit, the patient reports doing well. However, he lost weight recently without changing anything drastically. He had gained weight (~ 30 pounds) within the last year, but then lost it all prior to today. He feels that he is still losing weight, and thinks that his appetite has decreased as well. He has not changed his diet in the last year. He also suffered from some intermittent nausea that would last for a few hours at a time. The patient also reports getting the chicken pox for the first time in February 2019, but did not mention any complications or issues recovering from it.     Today, the patient reports feeling well. He had no edema in his legs today and denies edema at any other times. His weight today is only four pounds different than it was last year. He had no other questions or concerns today.      Recent Hospitalizations:  [x] No [] Yes    New Medical Issues: [x] No [] Yes    Decreased energy: [] No [x] Yes Has stayed  consistent   Chest pain or SOB with exertion:  [x] No [] Yes    Appetite change or weight change: [x] No [] Yes Slight decrease in appetite, lost weight recently   Nausea, vomiting or diarrhea:  [] No [x] Yes Intermittent nausea   Fever, sweats or chills: [] No [x] Yes Night sweats once per month   Leg swelling: [x] No [] Yes      Home BP: 120-130/70-80    Review of Systems   A comprehensive review of systems was obtained and negative, except as noted in the HPI or PMH.    Problem List   Patient Active Problem List   Diagnosis     HTN, kidney transplant related     Hydrocele, right     Kidney replaced by transplant     CKD (chronic kidney disease) stage 2     Aftercare following organ transplant     Immunosuppression (H)     Anemia in chronic renal disease     CKD (chronic kidney disease) stage 3, GFR 30-59 ml/min (H)     Anemia of chronic renal failure, stage 3 (moderate) (H)       Social History   Social History     Tobacco Use     Smoking status: Never Smoker     Smokeless tobacco: Never Used   Substance Use Topics     Alcohol use: Yes     Comment: rare     Drug use: Never       Allergies   Allergies   Allergen Reactions     Lisinopril Other (See Comments)     headache       Medications   Current Outpatient Medications   Medication Sig     everolimus (ZORTRESS) 0.5 MG tablet Take 2 tablets (1 mg) by mouth 2 times daily Total dose is 1.75 mg twice a day.     everolimus (ZORTRESS) 0.75 MG tablet Take 1 tablet (0.75 mg) by mouth 2 times daily Total dose is 1.75 mg twice a day     famotidine (PEPCID) 20 MG tablet Take 20 mg by mouth 2 times daily     metoprolol (LOPRESSOR) 100 MG tablet Take 1 tablet (100 mg) by mouth 2 times daily     predniSONE (DELTASONE) 5 MG tablet Take 1 tablet (5 mg) by mouth daily     sertraline (ZOLOFT) 100 MG tablet Take 100 mg by mouth daily     sulfamethoxazole-trimethoprim (BACTRIM/SEPTRA) 400-80 MG tablet Take 1 tablet by mouth twice a week Twice a week, decreased for low WBC      clotrimazole (LOTRIMIN) 1 % external cream Apply topically 2 times daily (Patient not taking: Reported on 8/24/2020)     losartan (COZAAR) 50 MG tablet Take 1 tablet (50 mg) by mouth daily (Patient not taking: Reported on 8/24/2020)     sodium bicarbonate 650 MG tablet Take 1 tablet (650 mg) by mouth 2 times daily (Patient not taking: Reported on 8/24/2020)     No current facility-administered medications for this visit.      There are no discontinued medications.    Physical Exam   Vital Signs: /72   Temp 98.4  F (36.9  C) (Oral)   Wt 113.1 kg (249 lb 4.8 oz)   BMI 32.01 kg/m      GENERAL APPEARANCE: alert and no distress  HENT: mouth without ulcers or lesions  LYMPHATICS: no cervical or supraclavicular nodes  RESP: lungs clear to auscultation - no rales, rhonchi or wheezes  CV: regular rhythm, normal rate, no rub, no murmur  EDEMA: no LE edema bilaterally  ABDOMEN: soft, nondistended, nontender, bowel sounds normal, overweight  MS: extremities normal - no gross deformities noted, no evidence of inflammation in joints, no muscle tenderness  SKIN: no rash  TX KIDNEY: normal  DIALYSIS ACCESS:  None      Data     Renal Latest Ref Rng & Units 8/18/2020 7/9/2020 6/26/2020   Na mmol/L - - -   Na (external) 134 - 143 mEq/L 141 140 140   K mmol/L - - -   K (external) 3.4 - 5.1 mEq/L 3.9 4.0 3.8   Cl mmol/L - - -   Cl (external) 99 - 110 mEq/L 105 105 104   CO2 mmol/L - - -   CO2 (external) 19 - 29 mEq/L 24 24 23   BUN mg/dL - - -   BUN (external) 5 - 24 mg/dL 17 23 20   Cr 0.50 - 1.00 mg/dL - - -   Cr (external) 0.70 - 1.20 mg/dL 1.53(H) 1.47(H) 1.44(H)   Glucose 60 - 99 mg/dL - - -   Glucose (external) 70 - 99 mg/dL 96 93 94   Ca  mg/dL - - -   Ca (external) 8.4 - 10.5 mg/dL 10.0 10.0 10.4   Mg mg/dL - - -   Mg (external) 1.5 - 2.2 mEq/L - - -     Bone Health Latest Ref Rng & Units 9/18/2019 8/26/2019 7/11/2017   Phos mg/dL - - -   Phos (external) 2.5 - 46 mg/dL 3.5 - 3.9   PTHi 18 - 80 pg/mL - 12(L) -   Vit D  Def 20 - 75 ug/L - 29 -   Vit D Def (external) 24.0 - 102.0 pg/mL 51.3 - -     Heme Latest Ref Rng & Units 8/18/2020 7/9/2020 6/26/2020   WBC 10:9/L - - -   WBC (external) 3.2 - 11.0 10*9/L 4.1 6.4 5.4   Hgb 13.3 - 17.7 g/dL - - -   Hgb (external) 12.9 - 16.9 g/dL 14.9 14.3 13.6   Plt 10:9/L - - -   Plt (external) 130 - 375 10*9/L 209 223 216   ABSOLUTE NEUTROPHIL - - - -   ABSOLUTE NEUTROPHILS (EXTERNAL) 1.5 - 7.6 10*9/L 2.3 4.2 3.1   ABSOLUTE LYMPHOCYTES - - - -   ABSOLUTE LYMPHOCYTES (EXTERNAL) 0.8 - 3.3 10*9/L 0.9 1.5 1.6   ABSOLUTE MONOCYTES 0.0 - 1.3 10e9/L - - -   ABSOLUTE MONOCYTES (EXTERNAL) 0.2 - 0.9 10*9/L 0.8 0.6 0.5   ABSOLUTE EOSINOPHILS 0.0 - 0.7 10e9/L - - -   ABSOLUTE EOSINOPHILS (EXTERNAL) 0.0 - 0.4 10*9/L 0.1 0.1 0.0   ABSOLUTE BASOPHILS 0.0 - 0.2 10e9/L - - -   ABSOLUTE BASOPHILS (EXTERNAL) 0.0 - 0.1 MMOL/L 0.0 0.0 0.0   ABS IMMATURE GRANULOCYTES 0 - 0.03 10e9/L - - -     Liver Latest Ref Rng & Units 6/26/2020 4/3/2020 3/3/2020   AP 65 - 260 U/L - - -   AP (external) 40 - 150 IU/L 105 72 88   TBili 0.2 - 1.3 mg/dL - - -   TBili (external) 0.2 - 1.2 mg/dL 0.4 0.4 0.3   DBili (external) 0.0 - 0.5 mg/dL - 0.2 -   ALT 0 - 50 U/L - - -   ALT (external) 6 - 40 IU/L 35 20 24   AST 0 - 45 U/L - - -   AST (external) 10 - 40 IU/L 24 18 15   Tot Protein 6.8 - 8.8 g/dL - - -   Tot Protein (external) 6.0 - 8.0 g/dL 7.9 6.9 6.8   Albumin 3.9 - 5.1 g/dL - - -   Albumin (external) 3.5 - 5.0 g/dL 4.3 3.6 4.0        Iron studies Latest Ref Rng & Units 6/26/2020 3/3/2020 1/29/2020   Iron 35 - 180 ug/dL - - -   Iron sat 15 - 46 % - - -   Ferritin 26 - 388 ng/mL - - -   Ferritin (external) 30 - 300 ng/mL 184 270 666(H)     [facility] Txp Virology Latest Ref Rng & Units 10/30/2019 8/26/2019 12/9/2013   CMV IgG EU/mL - - -   CMV IgM <0.90 - - -   CMV IgM Interp <0.90 - - -   CVM DNA Quant - - EDTA PLASMA Whole blood, EDTA anticoagulant   CMV Quant <100 Copies/mL - - <100   CMV QT Log <2.0 Log copies/mL - - <2.0  The  Cytomegalovirus DNA Quantitation assay is a real-time polymerase chain   reaction (PCR) utilizing analyte specific reagents manufactured by [employer]   [employer]. Analyte Specific Reagents (ASRs) are used in many laboratory   tests necessary for standard medical care and generally do not require FDA   approval.   This test was developed and its performance characteristics determined by   [facility].  It has not been   cleared or approved by the [place] Food and Drug Administration.   CMV QUANT IU/ML CMVND:CMV DNA Not Detected [IU]/mL - CMV DNA Not Detected -   LOG IU/ML OF CMVQNT <2.1 [Log:IU]/mL - Not Calculated -   BK Spec - - - Plasma, EDTA anticoagulant  CORRECTED ON 12/16 AT 0902: PREVIOUSLY REPORTED AS Whole blood, EDTA   anticoagulant   BK Res <1000 copies/mL - - <1000   BK Log <3.0 Log copies/mL - - <3.0  The lower limit of detection for this assay is 1000 copies/mL.  Real-time TaqMan   PCR was performed using BK primers and probe for the detection of a 90 bp   portion of the  1 gene.  The performance characteristics were validated by   the [facility].   It has not been cleared or approved by the U.S. Food and Drug Administration.   EBV IgG - - - -   EBV DNA COPIES/ML EBVNEG:EBV DNA Not Detected [Copies]/mL EBV DNA Not Detected EBV DNA Not Detected <1000   EBV DNA LOG OF COPIES <2.7 [Log:copies]/mL Not Calculated Not Calculated <3.0   Hep B Core NEG - - -   Hep B Surf - - - -   HIV 1&2 NEG - - -       Again, thank you for allowing me to participate in the care of your patient.      Sincerely,    -Sander Love MD, YESENIA  Transplant Nephrology  Pager: [phone]

## 2020-08-24 NOTE — PROGRESS NOTES
CHRONIC TRANSPLANT NEPHROLOGY VISIT    Assessment & Plan   # LDKT: Stable   - Baseline Cr ~ 1.6-1.9   - Proteinuria: Normal (<0.2 grams), UA showing 100mg/dL albuminuria   - Date DSA Last Checked: Feb/2016       Latest DSA: No   - BK Viremia: Not checked recently due to time from transplant   - Kidney Tx Biopsy: Aug 26, 2011; Result: No diagnostic evidence of acute rejection. Unremarkable.    -Recommend checking DSA with Scr increases    # Immunosuppression: Everolimus (goal 4-6)  and Prednisone (5mg)   - Changes: No, but since last visit, CsA and MMF stopped due to PTLD    # Prophylaxis:   - PJP: Sulfa/TMP (Bactrim)    # Hypertension: Controlled; Goal BP: < 130/80   - Changes: No    # Anemia in Chronic Renal Disease: Hgb: Stable  MELIA: No   - Iron studies: Not checked recently    # Mineral Bone Disorder:   - Secondary renal hyperparathyroidism; PTH level: Normal (18-80 pg/ml)  - Vitamin D; level: Normal  - Calcium; level: High normal    # Electrolytes:   - Potassium; level: Normal  - Bicarbonate; level: Normal    # PTLD: Stage IV DLBCL, s/p R-CHOP w/ 6 cycles of therapy, now on mTORi + predisone. C/b neutropenic fevers after round 1 R-CHOP. PET scan showing shrinkage of mass but persistent uptake in 5.5cm mass in LLQ, partial response noted. S/p consolidative radiotherapy to the residual mass in the LLQ, 30Gy over 15 fractions.     # Skin Cancer Risk:    - Discussed sun protection and recommend regular follow up with Dermatology.    # Medical Compliance: Yes    # Transplant History:  Etiology of kidney failure: congenital solitary kidney  Tx: LDKT  Transplant: 2/3/2009 (Kidney)  Donor Type: Living Donor Class: Standard Criteria Donor  Significant changes in immunosuppression: None  Significant transplant-related complications: None    Transplant Office Phone Number: [phone]    Assessment and plan was discussed with the patient and he voiced his understanding and agreement.    Return visit: Return in about 6  months (around 2/24/2021).     Physician Attestation   I, Sander Love MD, personally examined and evaluated this patient.  I discussed the patient with the resident/fellow and care team, and agree with the assessment and plan of care as documented in the note on 08/24/20 .      I personally reviewed vital signs, medications, labs and imaging.    Key findings: 25yM w/ PMH of congenital solitary kidney s/p LDKT 2/2009 complicated by Stage IV DLBCL with mass in RLQ s/p 6 cycles of R-CHOP as well as localized radiation to RLQ mass, now on everolimus and prednisone 5mg daily presenting for follow up. The patient's weight is now stable.. He follows with an oncologist at an outside hospital and I have read his notes. There is a concern that he may continue to have a mass in the RLQ from the radiation. Plan for repeat PET-CT 12/2020.   Sander Love MD  Date of Service (when I saw the patient): 08/24/20          Chief Complaint   Mr. Steven is a 25 year old here for routine follow up.    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 He was last seen in clinic by Dr. Vernon on 8/9/18; please see that note for further details.     Since his last visit, the patient reports doing well. However, he lost weight recently without changing anything drastically. He had gained weight (~ 30 pounds) within the last year, but then lost it all prior to today. He feels that he is still losing weight, and thinks that his appetite has decreased as well. He has not changed his diet in the last year. He also suffered from some intermittent nausea that would last for a few hours at a time. The patient also reports getting the chicken pox for the first time in February 2019, but did not mention any complications or issues recovering from it.     Today, the patient reports feeling well. He  had no edema in his legs today and denies edema at any other times. His weight today is only four pounds different than it was last year. He had no other questions or concerns today.      Recent Hospitalizations:  [x] No [] Yes    New Medical Issues: [x] No [] Yes    Decreased energy: [] No [x] Yes Has stayed consistent   Chest pain or SOB with exertion:  [x] No [] Yes    Appetite change or weight change: [x] No [] Yes Slight decrease in appetite, lost weight recently   Nausea, vomiting or diarrhea:  [] No [x] Yes Intermittent nausea   Fever, sweats or chills: [] No [x] Yes Night sweats once per month   Leg swelling: [x] No [] Yes      Home BP: 120-130/70-80    Review of Systems   A comprehensive review of systems was obtained and negative, except as noted in the HPI or PMH.    Problem List   Patient Active Problem List   Diagnosis     HTN, kidney transplant related     Hydrocele, right     Kidney replaced by transplant     CKD (chronic kidney disease) stage 2     Aftercare following organ transplant     Immunosuppression (H)     Anemia in chronic renal disease     CKD (chronic kidney disease) stage 3, GFR 30-59 ml/min (H)     Anemia of chronic renal failure, stage 3 (moderate) (H)       Social History   Social History     Tobacco Use     Smoking status: Never Smoker     Smokeless tobacco: Never Used   Substance Use Topics     Alcohol use: Yes     Comment: rare     Drug use: Never       Allergies   Allergies   Allergen Reactions     Lisinopril Other (See Comments)     headache       Medications   Current Outpatient Medications   Medication Sig     everolimus (ZORTRESS) 0.5 MG tablet Take 2 tablets (1 mg) by mouth 2 times daily Total dose is 1.75 mg twice a day.     everolimus (ZORTRESS) 0.75 MG tablet Take 1 tablet (0.75 mg) by mouth 2 times daily Total dose is 1.75 mg twice a day     famotidine (PEPCID) 20 MG tablet Take 20 mg by mouth 2 times daily     metoprolol (LOPRESSOR) 100 MG tablet Take 1 tablet (100 mg) by  mouth 2 times daily     predniSONE (DELTASONE) 5 MG tablet Take 1 tablet (5 mg) by mouth daily     sertraline (ZOLOFT) 100 MG tablet Take 100 mg by mouth daily     sulfamethoxazole-trimethoprim (BACTRIM/SEPTRA) 400-80 MG tablet Take 1 tablet by mouth twice a week Twice a week, decreased for low WBC     clotrimazole (LOTRIMIN) 1 % external cream Apply topically 2 times daily (Patient not taking: Reported on 8/24/2020)     losartan (COZAAR) 50 MG tablet Take 1 tablet (50 mg) by mouth daily (Patient not taking: Reported on 8/24/2020)     sodium bicarbonate 650 MG tablet Take 1 tablet (650 mg) by mouth 2 times daily (Patient not taking: Reported on 8/24/2020)     No current facility-administered medications for this visit.      There are no discontinued medications.    Physical Exam   Vital Signs: /72   Temp 98.4  F (36.9  C) (Oral)   Wt 113.1 kg (249 lb 4.8 oz)   BMI 32.01 kg/m      GENERAL APPEARANCE: alert and no distress  HENT: mouth without ulcers or lesions  LYMPHATICS: no cervical or supraclavicular nodes  RESP: lungs clear to auscultation - no rales, rhonchi or wheezes  CV: regular rhythm, normal rate, no rub, no murmur  EDEMA: no LE edema bilaterally  ABDOMEN: soft, nondistended, nontender, bowel sounds normal, overweight  MS: extremities normal - no gross deformities noted, no evidence of inflammation in joints, no muscle tenderness  SKIN: no rash  TX KIDNEY: normal  DIALYSIS ACCESS:  None      Data     Renal Latest Ref Rng & Units 8/18/2020 7/9/2020 6/26/2020   Na mmol/L - - -   Na (external) 134 - 143 mEq/L 141 140 140   K mmol/L - - -   K (external) 3.4 - 5.1 mEq/L 3.9 4.0 3.8   Cl mmol/L - - -   Cl (external) 99 - 110 mEq/L 105 105 104   CO2 mmol/L - - -   CO2 (external) 19 - 29 mEq/L 24 24 23   BUN mg/dL - - -   BUN (external) 5 - 24 mg/dL 17 23 20   Cr 0.50 - 1.00 mg/dL - - -   Cr (external) 0.70 - 1.20 mg/dL 1.53(H) 1.47(H) 1.44(H)   Glucose 60 - 99 mg/dL - - -   Glucose (external) 70 - 99  mg/dL 96 93 94   Ca  mg/dL - - -   Ca (external) 8.4 - 10.5 mg/dL 10.0 10.0 10.4   Mg mg/dL - - -   Mg (external) 1.5 - 2.2 mEq/L - - -     Bone Health Latest Ref Rng & Units 9/18/2019 8/26/2019 7/11/2017   Phos mg/dL - - -   Phos (external) 2.5 - 46 mg/dL 3.5 - 3.9   PTHi 18 - 80 pg/mL - 12(L) -   Vit D Def 20 - 75 ug/L - 29 -   Vit D Def (external) 24.0 - 102.0 pg/mL 51.3 - -     Heme Latest Ref Rng & Units 8/18/2020 7/9/2020 6/26/2020   WBC 10:9/L - - -   WBC (external) 3.2 - 11.0 10*9/L 4.1 6.4 5.4   Hgb 13.3 - 17.7 g/dL - - -   Hgb (external) 12.9 - 16.9 g/dL 14.9 14.3 13.6   Plt 10:9/L - - -   Plt (external) 130 - 375 10*9/L 209 223 216   ABSOLUTE NEUTROPHIL - - - -   ABSOLUTE NEUTROPHILS (EXTERNAL) 1.5 - 7.6 10*9/L 2.3 4.2 3.1   ABSOLUTE LYMPHOCYTES - - - -   ABSOLUTE LYMPHOCYTES (EXTERNAL) 0.8 - 3.3 10*9/L 0.9 1.5 1.6   ABSOLUTE MONOCYTES 0.0 - 1.3 10e9/L - - -   ABSOLUTE MONOCYTES (EXTERNAL) 0.2 - 0.9 10*9/L 0.8 0.6 0.5   ABSOLUTE EOSINOPHILS 0.0 - 0.7 10e9/L - - -   ABSOLUTE EOSINOPHILS (EXTERNAL) 0.0 - 0.4 10*9/L 0.1 0.1 0.0   ABSOLUTE BASOPHILS 0.0 - 0.2 10e9/L - - -   ABSOLUTE BASOPHILS (EXTERNAL) 0.0 - 0.1 MMOL/L 0.0 0.0 0.0   ABS IMMATURE GRANULOCYTES 0 - 0.03 10e9/L - - -     Liver Latest Ref Rng & Units 6/26/2020 4/3/2020 3/3/2020   AP 65 - 260 U/L - - -   AP (external) 40 - 150 IU/L 105 72 88   TBili 0.2 - 1.3 mg/dL - - -   TBili (external) 0.2 - 1.2 mg/dL 0.4 0.4 0.3   DBili (external) 0.0 - 0.5 mg/dL - 0.2 -   ALT 0 - 50 U/L - - -   ALT (external) 6 - 40 IU/L 35 20 24   AST 0 - 45 U/L - - -   AST (external) 10 - 40 IU/L 24 18 15   Tot Protein 6.8 - 8.8 g/dL - - -   Tot Protein (external) 6.0 - 8.0 g/dL 7.9 6.9 6.8   Albumin 3.9 - 5.1 g/dL - - -   Albumin (external) 3.5 - 5.0 g/dL 4.3 3.6 4.0        Iron studies Latest Ref Rng & Units 6/26/2020 3/3/2020 1/29/2020   Iron 35 - 180 ug/dL - - -   Iron sat 15 - 46 % - - -   Ferritin 26 - 388 ng/mL - - -   Ferritin (external) 30 - 300 ng/mL 184 969  666(H)     [facility] Virology Latest Ref Rng & Units 10/30/2019 8/26/2019 12/9/2013   CMV IgG EU/mL - - -   CMV IgM <0.90 - - -   CMV IgM Interp <0.90 - - -   CVM DNA Quant - - EDTA PLASMA Whole blood, EDTA anticoagulant   CMV Quant <100 Copies/mL - - <100   CMV QT Log <2.0 Log copies/mL - - <2.0  The Cytomegalovirus DNA Quantitation assay is a real-time polymerase chain   reaction (PCR) utilizing analyte specific reagents manufactured by [employer]   [facility]. Analyte Specific Reagents (ASRs) are used in many laboratory   tests necessary for standard medical care and generally do not require FDA   approval.   This test was developed and its performance characteristics determined by   [facility].  It has not been   cleared or approved by the US Food and Drug Administration.   CMV QUANT IU/ML CMVND:CMV DNA Not Detected [IU]/mL - CMV DNA Not Detected -   LOG IU/ML OF CMVQNT <2.1 [Log:IU]/mL - Not Calculated -   BK Spec - - - Plasma, EDTA anticoagulant  CORRECTED ON 12/16 AT 0902: PREVIOUSLY REPORTED AS Whole blood, EDTA   anticoagulant   BK Res <1000 copies/mL - - <1000   BK Log <3.0 Log copies/mL - - <3.0  The lower limit of detection for this assay is 1000 copies/mL.  Real-time TaqMan   PCR was performed using BK primers and probe for the detection of a 90 bp   portion of the  1 gene.  The performance characteristics were validated by   the [facility].   It has not been cleared or approved by the U.S. Food and Drug Administration.   EBV IgG - - - -   EBV DNA COPIES/ML EBVNEG:EBV DNA Not Detected [Copies]/mL EBV DNA Not Detected EBV DNA Not Detected <1000   EBV DNA LOG OF COPIES <2.7 [Log:copies]/mL Not Calculated Not Calculated <3.0   Hep B Core NEG - - -   Hep B Surf - - - -   HIV 1&2 NEG - - -

## 2020-08-27 ENCOUNTER — TELEPHONE (OUTPATIENT)
Dept: NEPHROLOGY | Facility: CLINIC | Age: 25
End: 2020-08-27

## 2020-10-28 ENCOUNTER — TELEPHONE (OUTPATIENT)
Dept: TRANSPLANT | Facility: CLINIC | Age: 25
End: 2020-10-28

## 2020-10-28 DIAGNOSIS — Z94.0 KIDNEY REPLACED BY TRANSPLANT: ICD-10-CM

## 2020-10-28 DIAGNOSIS — Z94.0 KIDNEY TRANSPLANTED: ICD-10-CM

## 2020-10-28 DIAGNOSIS — Z48.298 AFTERCARE FOLLOWING ORGAN TRANSPLANT: ICD-10-CM

## 2020-10-28 DIAGNOSIS — Z79.899 ENCOUNTER FOR LONG-TERM (CURRENT) USE OF HIGH-RISK MEDICATION: ICD-10-CM

## 2020-10-28 DIAGNOSIS — Z79.899 ENCOUNTER FOR LONG-TERM CURRENT USE OF MEDICATION: ICD-10-CM

## 2020-10-28 DIAGNOSIS — Z48.298 AFTERCARE FOLLOWING ORGAN TRANSPLANT: Primary | ICD-10-CM

## 2020-10-28 PROCEDURE — 80169 DRUG ASSAY EVEROLIMUS: CPT | Performed by: INTERNAL MEDICINE

## 2020-10-28 RX ORDER — ATORVASTATIN CALCIUM 20 MG/1
20 TABLET, FILM COATED ORAL EVERY EVENING
Qty: 30 TABLET | Refills: 11 | Status: SHIPPED | OUTPATIENT
Start: 2020-10-28

## 2020-10-28 NOTE — TELEPHONE ENCOUNTER
Issue:  Cholesterol 261  Triglycerides 320    Plan:  Sander Love MD Harris, Kathleen RN             Please start atorva 20mg PO at bedtime. Thanks      Outcome:  Called Hussain to discuss above plan. No answer. Left a voice message advising him to start Atorvastatin 20 daily at bedtime. Advised him on dietary changes and offered a dietician referral. My chart message sent as well. Requested a call back to confirm understanding.   Prescription sent.

## 2020-11-02 LAB
EVEROLIMUS BLD-MCNC: 4.5 UG/L (ref 3–8)
TME LAST DOSE: NORMAL H

## 2020-12-06 ENCOUNTER — HEALTH MAINTENANCE LETTER (OUTPATIENT)
Age: 25
End: 2020-12-06

## 2020-12-15 DIAGNOSIS — Z79.899 ENCOUNTER FOR LONG-TERM CURRENT USE OF MEDICATION: ICD-10-CM

## 2020-12-15 DIAGNOSIS — Z48.298 AFTERCARE FOLLOWING ORGAN TRANSPLANT: ICD-10-CM

## 2020-12-15 DIAGNOSIS — Z94.0 KIDNEY REPLACED BY TRANSPLANT: ICD-10-CM

## 2020-12-15 PROCEDURE — 80169 DRUG ASSAY EVEROLIMUS: CPT | Performed by: INTERNAL MEDICINE

## 2020-12-21 LAB
EVEROLIMUS BLD-MCNC: 6.1 UG/L (ref 3–8)
TME LAST DOSE: NORMAL H

## 2021-02-12 DIAGNOSIS — Z48.298 AFTERCARE FOLLOWING ORGAN TRANSPLANT: ICD-10-CM

## 2021-02-12 DIAGNOSIS — Z94.0 KIDNEY REPLACED BY TRANSPLANT: ICD-10-CM

## 2021-02-12 DIAGNOSIS — Z79.899 ENCOUNTER FOR LONG-TERM CURRENT USE OF MEDICATION: ICD-10-CM

## 2021-02-12 PROCEDURE — 80169 DRUG ASSAY EVEROLIMUS: CPT | Performed by: INTERNAL MEDICINE

## 2021-02-19 LAB
EVEROLIMUS BLD-MCNC: 4.9 UG/L (ref 3–8)
TME LAST DOSE: NORMAL H

## 2021-02-23 ENCOUNTER — VIRTUAL VISIT (OUTPATIENT)
Dept: NEPHROLOGY | Facility: CLINIC | Age: 26
End: 2021-02-23
Attending: INTERNAL MEDICINE
Payer: COMMERCIAL

## 2021-02-23 DIAGNOSIS — Z48.298 AFTERCARE FOLLOWING ORGAN TRANSPLANT: ICD-10-CM

## 2021-02-23 DIAGNOSIS — D84.9 IMMUNOSUPPRESSION (H): ICD-10-CM

## 2021-02-23 DIAGNOSIS — I15.1 HTN, KIDNEY TRANSPLANT RELATED: ICD-10-CM

## 2021-02-23 DIAGNOSIS — Z94.0 HTN, KIDNEY TRANSPLANT RELATED: ICD-10-CM

## 2021-02-23 DIAGNOSIS — D47.Z1 PTLD (POST-TRANSPLANT LYMPHOPROLIFERATIVE DISORDER) (H): ICD-10-CM

## 2021-02-23 DIAGNOSIS — E78.5 DYSLIPIDEMIA: ICD-10-CM

## 2021-02-23 DIAGNOSIS — Z94.0 KIDNEY REPLACED BY TRANSPLANT: Primary | ICD-10-CM

## 2021-02-23 DIAGNOSIS — N18.31 STAGE 3A CHRONIC KIDNEY DISEASE (H): ICD-10-CM

## 2021-02-23 PROCEDURE — 99214 OFFICE O/P EST MOD 30 MIN: CPT | Mod: 95

## 2021-02-23 NOTE — LETTER
2/23/2021       RE: Hussain Steven  [address]  [place] [address]     Dear Colleague,    Thank you for referring your patient, Hussain Steven, to the [facility] [place] at [facility]. Please see a copy of my visit note below.    Hussain is a 25 year old who is being evaluated via a billable video visit.      How would you like to obtain your AVS? Mail a copy  If the video visit is dropped, the invitation should be resent by:  Will anyone else be joining your video visit? No    Video Start Time: 1505  Video-Visit Details    Type of service:  Video Visit    Video End Time:1521    Originating Location (pt. Location): Home    Distant Location (provider location):  [facility] [place]     Platform used for Video Visit: [email or web address]      CHRONIC TRANSPLANT NEPHROLOGY VISIT    Assessment & Plan   # LDKT: Stable   - Baseline Creatinine:  ~ 1.6-1.9   - Proteinuria: Minimal (0.2-0.5 grams)   - Date DSA Last Checked: Feb/2016      Latest DSA: No   - BK Viremia: Not checked recently due to time from transplant   - Kidney Tx Biopsy: Aug 26, 2011; Result: No diagnostic evidence of acute rejection.  Unremarkable             Apr 27, 2009; Result: Borderline acute cellular-mediated rejection.    # Immunosuppression: Everolimus (goal 4-6) and Prednisone (dose 5 mg daily)          - Continue with intensive monitoring of immunosuppression for efficacy and toxicity.          - Changes: Not at this time; With significant dyslipidemia, obesity and young age, would consider immunosuppression change consideration once he is further out from his PTLD.  Changes might include decreasing or stopping mTORi or prednisone for addition of mycophenolate.    # Infection Prophylaxis:   - PJP: Sulfa/TMP (Bactrim)    # Hypertension: Controlled;  Goal BP: < 130/80   - Changes: No, will continue on metoprolol due to higher heart rate when he is off beta  blocker.3    # Mineral Bone Disorder:   - Vitamin D; level: Not checked recently        On supplement: No  - Calcium; level: Normal        On supplement: No    # Electrolytes:   - Potassium; level: Low normal        On supplement: No  - Bicarbonate; level: Normal        On supplement: No    # Diffuse Large B-cell Lymphoma (PTLD): Patient was diagnosed 11/2019 with left-sided abdominal mass and weight loss.  He was treated with R-CHOP x 6 cycles followed by XRT 4/2020.  Patient's immunosuppression was changed from cyclosporine and mycophenolate mofetil to prednisone during chemotherapy and then started on everolimus along with prednisone after he was done with treatment.  Repeat CT/PET showed persistent update in right lower quadrant, although biopsy was benign.  No other evidence of recurrence at this time.  Patient just followed up with his Oncologist earlier today.    # Obesity, Class I (BMI = 33,5): His weight is up a few pounds after losing significant weight with his previous cancer diagnosis.   - Recommend weight loss for overall health by increasing exercise and watching caloric intake.    # Fatigue: No recent change, but with h/o of PTLD and young gentleman, would be concerned for infectious or other causes.   - Recommend checking CMV and EBV PCR, as well as TSH.    # Dyslipidemia: Poorly controlled, likely secondary to mTORi.  Recently started on atorvastatin.   - If PTLD remains in remission, could consider decreasing or stopping mTORi in the future if not controlled on statin.    # Skin Cancer Risk:    - Discussed sun protection and recommend regular follow up with Dermatology.    # Medical Compliance: Yes     # COVID-19 Virus Review: Discussed COVID-19 virus and the potential medical risks.  Reviewed preventative health recommendations, which includes washing hands for 20 seconds, avoid touching your face, and social distancing.  Asked patient to inform the transplant center if they are exposed or  diagnosed with this virus.    # Transplant History:  Etiology of Kidney Failure: Congenital solitary kidney  Tx: LDKT  Transplant: 2/3/2009 (Kidney)  Significant changes in immunosuppression: Changed off CNI and antimetabolite due to PTLD.  Significant transplant-related complications: Post-Transplant Lymphoproliferative Disorder (PTLD) and Acute cellular-mediated rejection    Transplant Office Phone Number: [phone]    Assessment and plan was discussed with the patient and he voiced his understanding and agreement.    Return visit: Return in about 1 year (around 2/23/2022).    James Dumont MD    Chief Complaint   Mr. Steven is a 25 year old here for kidney transplant and immunosuppression management.    History of Present Illness    Mr. Steven reports feeling pretty good overall with some medical complaints.  Since last clinic visit, patient reports no hospitalizations or new medical complaints and has been doing well overall.  He has a history of PTLD, diagnosed 11/2019.  Patient received R-CHOP x 6 cycles, as well as XRT.  His immunosuppression was changed to everolimus and prednisone.  Patient has no evidence of recurrence at this time.  He did have a right abdominal mass seen on latest CT/PET, but biopsy was benign.  Patient was seen by Oncology earlier today.  He appears to be having some side effects with everolimus as labs show significant dyslipidemia and patient was started on atorvastatin.    His energy level is low, although has been stable.  He is active, but really gets minimal exercise, especially during the cold winter months.  Denies any chest pain or shortness of breath with exertion.  Appetite is good and he has gained the weight back that he lost with the cancer.  No nausea, vomiting or diarrhea.  No fever, sweats or chills.  No leg swelling.    Home BP: 100/70s    Problem List   Patient Active Problem List   Diagnosis     HTN, kidney transplant related     Hydrocele, right      Kidney replaced by transplant     Aftercare following organ transplant     Immunosuppression (H)     CKD (chronic kidney disease) stage 3, GFR 30-59 ml/min     Dyslipidemia     PTLD (post-transplant lymphoproliferative disorder) (H)     GERD (gastroesophageal reflux disease)     Class 1 obesity in adult       Allergies   Allergies   Allergen Reactions     Lisinopril Other (See Comments)     headache       Medications   Current Outpatient Medications   Medication Sig     atorvastatin (LIPITOR) 20 MG tablet Take 1 tablet (20 mg) by mouth every evening     clotrimazole (LOTRIMIN) 1 % external cream Apply topically 2 times daily (Patient not taking: Reported on 8/24/2020)     everolimus (ZORTRESS) 0.5 MG tablet Take 2 tablets (1 mg) by mouth 2 times daily Total dose is 1.75 mg twice a day.     everolimus (ZORTRESS) 0.75 MG tablet Take 1 tablet (0.75 mg) by mouth 2 times daily Total dose is 1.75 mg twice a day     famotidine (PEPCID) 20 MG tablet Take 20 mg by mouth 2 times daily     metoprolol (LOPRESSOR) 100 MG tablet Take 1 tablet (100 mg) by mouth 2 times daily     predniSONE (DELTASONE) 5 MG tablet Take 1 tablet (5 mg) by mouth daily     sertraline (ZOLOFT) 100 MG tablet Take 100 mg by mouth daily     sulfamethoxazole-trimethoprim (BACTRIM/SEPTRA) 400-80 MG tablet Take 1 tablet by mouth twice a week Twice a week, decreased for low WBC     No current facility-administered medications for this visit.      Medications Discontinued During This Encounter   Medication Reason     losartan (COZAAR) 50 MG tablet      sodium bicarbonate 650 MG tablet        Physical Exam   Vital signs were deferred for this telemedicine visit.    GENERAL APPEARANCE: alert and no distress  HENT: no obvious abnormalities on appearance  RESP: breathing appears unremarkable with normal rate, no audible wheezing or cough and no apparent shortness of breath with conversation  MS: extremities normal - no gross deformities noted  SKIN: no apparent  rash and normal skin tone  NEURO: speech is clear with no obvious neurological deficits  PSYCH: mentation appears normal and affect normal    Data     Renal Latest Ref Rng & Units 2/12/2021 12/15/2020 10/28/2020   Na mmol/L - - -   Na (external) 134 - 143 mEq/L 140 143 142   K mmol/L - - -   K (external) 3.4 - 5.1 mEq/L 3.6 3.8 3.8   Cl mmol/L - - -   Cl (external) 99 - 110 mEq/L 103 104 106   CO2 mmol/L - - -   CO2 (external) 19 - 29 mEq/L 26 26 21   BUN mg/dL - - -   BUN (external) 5 - 24 mg/dL 20 17 20   Cr 0.50 - 1.00 mg/dL - - -   Cr (external) 0.70 - 1.20 mg/dL 1.75(H) 1.76(H) 1.64(H)   Glucose 60 - 99 mg/dL - - -   Glucose (external) 70 - 99 mg/dL 96 95 101(H)   Ca  mg/dL - - -   Ca (external) 8.4 - 10.5 mg/dL 9.7 10.1 9.7   Mg mg/dL - - -   Mg (external) 1.5 - 2.2 mEq/L - - -     Bone Health Latest Ref Rng & Units 9/18/2019 8/26/2019 7/11/2017   Phos mg/dL - - -   Phos (external) 2.5 - 46 mg/dL 3.5 - 3.9   PTHi 18 - 80 pg/mL - 12(L) -   Vit D Def 20 - 75 ug/L - 29 -   Vit D Def (external) 24.0 - 102.0 pg/mL 51.3 - -     Heme Latest Ref Rng & Units 2/12/2021 12/15/2020 10/28/2020   WBC 10:9/L - - -   WBC (external) 3.2 - 11.0 10*9/L 7.1 7.8 5.9   Hgb 13.3 - 17.7 g/dL - - -   Hgb (external) 12.9 - 16.9 g/dL 15.6 15.2 14.3   Plt 10:9/L - - -   Plt (external) 130 - 375 10*9/L 211 196 212   ABSOLUTE NEUTROPHIL - - - -   ABSOLUTE NEUTROPHILS (EXTERNAL) 1.5 - 7.6 10*9/L 5.2 5.7 4.2   ABSOLUTE LYMPHOCYTES - - - -   ABSOLUTE LYMPHOCYTES (EXTERNAL) 0.8 - 3.3 10*9/L 1.1 1.1 1.1   ABSOLUTE MONOCYTES 0.0 - 1.3 10e9/L - - -   ABSOLUTE MONOCYTES (EXTERNAL) 0.2 - 0.9 10*9/L 0.7 0.8 0.60   ABSOLUTE EOSINOPHILS 0.0 - 0.7 10e9/L - - -   ABSOLUTE EOSINOPHILS (EXTERNAL) 0.0 - 0.4 10*9/L 0.1 0.2 0.1   ABSOLUTE BASOPHILS 0.0 - 0.2 10e9/L - - -   ABSOLUTE BASOPHILS (EXTERNAL) 0.0 - 0.1 10*9/L 0.0 0.0 0.0   ABS IMMATURE GRANULOCYTES 0 - 0.03 10e9/L - - -     Liver Latest Ref Rng & Units 10/28/2020 6/26/2020 4/3/2020   AP 65 - 370  U/L - - -   AP (external) 40 - 150 IU/L 95 105 72   TBili 0.2 - 1.3 mg/dL - - -   TBili (external) 0.2 - 1.2 mg/dL 0.4 0.4 0.4   DBili (external) 0.0 - 0.5 mg/dL <0.2 - 0.2   ALT 0 - 50 U/L - - -   ALT (external) 6 - 40 IU/L 37 35 20   AST 0 - 45 U/L - - -   AST (external) 10 - 40 IU/L 23 24 18   Tot Protein 6.8 - 8.8 g/dL - - -   Tot Protein (external) 6.0 - 8.0 g/dL 7.4 7.9 6.9   Albumin 3.9 - 5.1 g/dL - - -   Albumin (external) 3.5 - 5.0 g/dL 4.0 4.3 3.6        Iron studies Latest Ref Rng & Units 6/26/2020 3/3/2020 1/29/2020   Iron 35 - 180 ug/dL - - -   Iron sat 15 - 46 % - - -   Ferritin 26 - 388 ng/mL - - -   Ferritin (external) 30 - 300 ng/mL 184 270 666(H)     [facility] Txp Virology Latest Ref Rng & Units 10/30/2019 8/26/2019 12/9/2013   CMV IgG EU/mL - - -   CMV IgM <0.90 - - -   CMV IgM Interp <0.90 - - -   CVM DNA Quant - - EDTA PLASMA Whole blood, EDTA anticoagulant   CMV Quant <100 Copies/mL - - <100   CMV QT Log <2.0 Log copies/mL - - <2.0  The Cytomegalovirus DNA Quantitation assay is a real-time polymerase chain   reaction (PCR) utilizing analyte specific reagents manufactured by [employer]   [employer]. Analyte Specific Reagents (ASRs) are used in many laboratory   tests necessary for standard medical care and generally do not require FDA   approval.   This test was developed and its performance characteristics determined by   [facility].  It has not been   cleared or approved by the US Food and Drug Administration.   CMV QUANT IU/ML CMVND:CMV DNA Not Detected [IU]/mL - CMV DNA Not Detected -   LOG IU/ML OF CMVQNT <2.1 [Log:IU]/mL - Not Calculated -   BK Spec - - - Plasma, EDTA anticoagulant  CORRECTED ON 12/16 AT 0902: PREVIOUSLY REPORTED AS Whole blood, EDTA   anticoagulant   BK Res <1000 copies/mL - - <1000   BK Log <3.0 Log copies/mL - - <3.0  The lower limit of detection for this assay is 1000 copies/mL.  Real-time TaqMan   PCR was performed using BK  primers and probe for the detection of a 90 bp   portion of the  1 gene.  The performance characteristics were validated by   the [facility] [place].   It has not been cleared or approved by the U.[place]. Food and Drug Administration.   EBV IgG - - - -   EBV DNA COPIES/ML EBVNEG:EBV DNA Not Detected [Copies]/mL EBV DNA Not Detected EBV DNA Not Detected <1000   EBV DNA LOG OF COPIES <2.7 [Log:copies]/mL Not Calculated Not Calculated <3.0   Hep B Core NEG - - -   Hep B Surf - - - -   HIV 1&2 NEG - - -              Again, thank you for allowing me to participate in the care of your patient.      Sincerely,    Kidney/Pancreas Recipient

## 2021-02-23 NOTE — PROGRESS NOTES
Hussain is a 25 year old who is being evaluated via a billable video visit.      How would you like to obtain your AVS? Mail a copy  If the video visit is dropped, the invitation should be resent by:  Will anyone else be joining your video visit? No    Video Start Time: 1505  Video-Visit Details    Type of service:  Video Visit    Video End Time:1521    Originating Location (pt. Location): Home    Distant Location (provider location):  [facility] [place]     Platform used for Video Visit: ividence      CHRONIC TRANSPLANT NEPHROLOGY VISIT    Assessment & Plan   # LDKT: Stable   - Baseline Creatinine:  ~ 1.6-1.9   - Proteinuria: Minimal (0.2-0.5 grams)   - Date DSA Last Checked: Feb/2016      Latest DSA: No   - BK Viremia: Not checked recently due to time from transplant   - Kidney Tx Biopsy: Aug 26, 2011; Result: No diagnostic evidence of acute rejection.  Unremarkable             Apr 27, 2009; Result: Borderline acute cellular-mediated rejection.    # Immunosuppression: Everolimus (goal 4-6) and Prednisone (dose 5 mg daily)          - Continue with intensive monitoring of immunosuppression for efficacy and toxicity.          - Changes: Not at this time; With significant dyslipidemia, obesity and young age, would consider immunosuppression change consideration once he is further out from his PTLD.  Changes might include decreasing or stopping mTORi or prednisone for addition of mycophenolate.    # Infection Prophylaxis:   - PJP: Sulfa/TMP (Bactrim)    # Hypertension: Controlled;  Goal BP: < 130/80   - Changes: No, will continue on metoprolol due to higher heart rate when he is off beta blocker.3    # Mineral Bone Disorder:   - Vitamin D; level: Not checked recently        On supplement: No  - Calcium; level: Normal        On supplement: No    # Electrolytes:   - Potassium; level: Low normal        On supplement: No  - Bicarbonate; level: Normal        On supplement: No    # Diffuse Large  B-cell Lymphoma (PTLD): Patient was diagnosed 11/2019 with left-sided abdominal mass and weight loss.  He was treated with R-CHOP x 6 cycles followed by XRT 4/2020.  Patient's immunosuppression was changed from cyclosporine and mycophenolate mofetil to prednisone during chemotherapy and then started on everolimus along with prednisone after he was done with treatment.  Repeat CT/PET showed persistent update in right lower quadrant, although biopsy was benign.  No other evidence of recurrence at this time.  Patient just followed up with his Oncologist earlier today.    # Obesity, Class I (BMI = 33,5): His weight is up a few pounds after losing significant weight with his previous cancer diagnosis.   - Recommend weight loss for overall health by increasing exercise and watching caloric intake.    # Fatigue: No recent change, but with h/o of PTLD and young gentleman, would be concerned for infectious or other causes.   - Recommend checking CMV and EBV PCR, as well as TSH.    # Dyslipidemia: Poorly controlled, likely secondary to mTORi.  Recently started on atorvastatin.   - If PTLD remains in remission, could consider decreasing or stopping mTORi in the future if not controlled on statin.    # Skin Cancer Risk:    - Discussed sun protection and recommend regular follow up with Dermatology.    # Medical Compliance: Yes     # COVID-19 Virus Review: Discussed COVID-19 virus and the potential medical risks.  Reviewed preventative health recommendations, which includes washing hands for 20 seconds, avoid touching your face, and social distancing.  Asked patient to inform the transplant center if they are exposed or diagnosed with this virus.    # Transplant History:  Etiology of Kidney Failure: Congenital solitary kidney  Tx: LDKT  Transplant: 2/3/2009 (Kidney)  Significant changes in immunosuppression: Changed off CNI and antimetabolite due to PTLD.  Significant transplant-related complications: Post-Transplant  Lymphoproliferative Disorder (PTLD) and Acute cellular-mediated rejection    Transplant Office Phone Number: [phone]    Assessment and plan was discussed with the patient and he voiced his understanding and agreement.    Return visit: Return in about 1 year (around 2/23/2022).    James Dumont MD    Chief Complaint   Mr. Steven is a 25 year old here for kidney transplant and immunosuppression management.    History of Present Illness    Mr. Steven reports feeling pretty good overall with some medical complaints.  Since last clinic visit, patient reports no hospitalizations or new medical complaints and has been doing well overall.  He has a history of PTLD, diagnosed 11/2019.  Patient received R-CHOP x 6 cycles, as well as XRT.  His immunosuppression was changed to everolimus and prednisone.  Patient has no evidence of recurrence at this time.  He did have a right abdominal mass seen on latest CT/PET, but biopsy was benign.  Patient was seen by Oncology earlier today.  He appears to be having some side effects with everolimus as labs show significant dyslipidemia and patient was started on atorvastatin.    His energy level is low, although has been stable.  He is active, but really gets minimal exercise, especially during the cold winter months.  Denies any chest pain or shortness of breath with exertion.  Appetite is good and he has gained the weight back that he lost with the cancer.  No nausea, vomiting or diarrhea.  No fever, sweats or chills.  No leg swelling.    Home BP: 100/70s    Problem List   Patient Active Problem List   Diagnosis     HTN, kidney transplant related     Hydrocele, right     Kidney replaced by transplant     Aftercare following organ transplant     Immunosuppression (H)     CKD (chronic kidney disease) stage 3, GFR 30-59 ml/min     Dyslipidemia     PTLD (post-transplant lymphoproliferative disorder) (H)     GERD (gastroesophageal reflux disease)     Class 1 obesity in adult        Allergies   Allergies   Allergen Reactions     Lisinopril Other (See Comments)     headache       Medications   Current Outpatient Medications   Medication Sig     atorvastatin (LIPITOR) 20 MG tablet Take 1 tablet (20 mg) by mouth every evening     clotrimazole (LOTRIMIN) 1 % external cream Apply topically 2 times daily (Patient not taking: Reported on 8/24/2020)     everolimus (ZORTRESS) 0.5 MG tablet Take 2 tablets (1 mg) by mouth 2 times daily Total dose is 1.75 mg twice a day.     everolimus (ZORTRESS) 0.75 MG tablet Take 1 tablet (0.75 mg) by mouth 2 times daily Total dose is 1.75 mg twice a day     famotidine (PEPCID) 20 MG tablet Take 20 mg by mouth 2 times daily     metoprolol (LOPRESSOR) 100 MG tablet Take 1 tablet (100 mg) by mouth 2 times daily     predniSONE (DELTASONE) 5 MG tablet Take 1 tablet (5 mg) by mouth daily     sertraline (ZOLOFT) 100 MG tablet Take 100 mg by mouth daily     sulfamethoxazole-trimethoprim (BACTRIM/SEPTRA) 400-80 MG tablet Take 1 tablet by mouth twice a week Twice a week, decreased for low WBC     No current facility-administered medications for this visit.      Medications Discontinued During This Encounter   Medication Reason     losartan (COZAAR) 50 MG tablet      sodium bicarbonate 650 MG tablet        Physical Exam   Vital signs were deferred for this telemedicine visit.    GENERAL APPEARANCE: alert and no distress  HENT: no obvious abnormalities on appearance  RESP: breathing appears unremarkable with normal rate, no audible wheezing or cough and no apparent shortness of breath with conversation  MS: extremities normal - no gross deformities noted  SKIN: no apparent rash and normal skin tone  NEURO: speech is clear with no obvious neurological deficits  PSYCH: mentation appears normal and affect normal    Data     Renal Latest Ref Rng & Units 2/12/2021 12/15/2020 10/28/2020   Na mmol/L - - -   Na (external) 134 - 143 mEq/L 140 143 142   K mmol/L - - -   K (external)  3.4 - 5.1 mEq/L 3.6 3.8 3.8   Cl mmol/L - - -   Cl (external) 99 - 110 mEq/L 103 104 106   CO2 mmol/L - - -   CO2 (external) 19 - 29 mEq/L 26 26 21   BUN mg/dL - - -   BUN (external) 5 - 24 mg/dL 20 17 20   Cr 0.50 - 1.00 mg/dL - - -   Cr (external) 0.70 - 1.20 mg/dL 1.75(H) 1.76(H) 1.64(H)   Glucose 60 - 99 mg/dL - - -   Glucose (external) 70 - 99 mg/dL 96 95 101(H)   Ca  mg/dL - - -   Ca (external) 8.4 - 10.5 mg/dL 9.7 10.1 9.7   Mg mg/dL - - -   Mg (external) 1.5 - 2.2 mEq/L - - -     Bone Health Latest Ref Rng & Units 9/18/2019 8/26/2019 7/11/2017   Phos mg/dL - - -   Phos (external) 2.5 - 46 mg/dL 3.5 - 3.9   PTHi 18 - 80 pg/mL - 12(L) -   Vit D Def 20 - 75 ug/L - 29 -   Vit D Def (external) 24.0 - 102.0 pg/mL 51.3 - -     Heme Latest Ref Rng & Units 2/12/2021 12/15/2020 10/28/2020   WBC 10:9/L - - -   WBC (external) 3.2 - 11.0 10*9/L 7.1 7.8 5.9   Hgb 13.3 - 17.7 g/dL - - -   Hgb (external) 12.9 - 16.9 g/dL 15.6 15.2 14.3   Plt 10:9/L - - -   Plt (external) 130 - 375 10*9/L 211 196 212   ABSOLUTE NEUTROPHIL - - - -   ABSOLUTE NEUTROPHILS (EXTERNAL) 1.5 - 7.6 10*9/L 5.2 5.7 4.2   ABSOLUTE LYMPHOCYTES - - - -   ABSOLUTE LYMPHOCYTES (EXTERNAL) 0.8 - 3.3 10*9/L 1.1 1.1 1.1   ABSOLUTE MONOCYTES 0.0 - 1.3 10e9/L - - -   ABSOLUTE MONOCYTES (EXTERNAL) 0.2 - 0.9 10*9/L 0.7 0.8 0.60   ABSOLUTE EOSINOPHILS 0.0 - 0.7 10e9/L - - -   ABSOLUTE EOSINOPHILS (EXTERNAL) 0.0 - 0.4 10*9/L 0.1 0.2 0.1   ABSOLUTE BASOPHILS 0.0 - 0.2 10e9/L - - -   ABSOLUTE BASOPHILS (EXTERNAL) 0.0 - 0.1 10*9/L 0.0 0.0 0.0   ABS IMMATURE GRANULOCYTES 0 - 0.03 10e9/L - - -     Liver Latest Ref Rng & Units 10/28/2020 6/26/2020 4/3/2020   AP 65 - 260 U/L - - -   AP (external) 40 - 150 IU/L 95 105 72   TBili 0.2 - 1.3 mg/dL - - -   TBili (external) 0.2 - 1.2 mg/dL 0.4 0.4 0.4   DBili (external) 0.0 - 0.5 mg/dL <0.2 - 0.2   ALT 0 - 50 U/L - - -   ALT (external) 6 - 40 IU/L 37 35 20   AST 0 - 45 U/L - - -   AST (external) 10 - 40 IU/L 23 24 18   Tot  Protein 6.8 - 8.8 g/dL - - -   Tot Protein (external) 6.0 - 8.0 g/dL 7.4 7.9 6.9   Albumin 3.9 - 5.1 g/dL - - -   Albumin (external) 3.5 - 5.0 g/dL 4.0 4.3 3.6        Iron studies Latest Ref Rng & Units 6/26/2020 3/3/2020 1/29/2020   Iron 35 - 180 ug/dL - - -   Iron sat 15 - 46 % - - -   Ferritin 26 - 388 ng/mL - - -   Ferritin (external) 30 - 300 ng/mL 184 270 666(H)     [facility] Txp Virology Latest Ref Rng & Units 10/30/2019 8/26/2019 12/9/2013   CMV IgG EU/mL - - -   CMV IgM <0.90 - - -   CMV IgM Interp <0.90 - - -   CVM DNA Quant - - EDTA PLASMA Whole blood, EDTA anticoagulant   CMV Quant <100 Copies/mL - - <100   CMV QT Log <2.0 Log copies/mL - - <2.0  The Cytomegalovirus DNA Quantitation assay is a real-time polymerase chain   reaction (PCR) utilizing analyte specific reagents manufactured by [employer]   [facility]. Analyte Specific Reagents (ASRs) are used in many laboratory   tests necessary for standard medical care and generally do not require FDA   approval.   This test was developed and its performance characteristics determined by   [facility].  It has not been   cleared or approved by the US Food and Drug Administration.   CMV QUANT IU/ML CMVND:CMV DNA Not Detected [IU]/mL - CMV DNA Not Detected -   LOG IU/ML OF CMVQNT <2.1 [Log:IU]/mL - Not Calculated -   BK Spec - - - Plasma, EDTA anticoagulant  CORRECTED ON 12/16 AT 0902: PREVIOUSLY REPORTED AS Whole blood, EDTA   anticoagulant   BK Res <1000 copies/mL - - <1000   BK Log <3.0 Log copies/mL - - <3.0  The lower limit of detection for this assay is 1000 copies/mL.  Real-time TaqMan   PCR was performed using BK primers and probe for the detection of a 90 bp   portion of the  1 gene.  The performance characteristics were validated by   the [facility].   It has not been cleared or approved by the U.S. Food and Drug Administration.   EBV IgG - - - -   EBV DNA COPIES/ML  EBVNEG:EBV DNA Not Detected [Copies]/mL EBV DNA Not Detected EBV DNA Not Detected <1000   EBV DNA LOG OF COPIES <2.7 [Log:copies]/mL Not Calculated Not Calculated <3.0   Hep B Core NEG - - -   Hep B Surf - - - -   HIV 1&2 NEG - - -

## 2021-02-23 NOTE — LETTER
2/23/2021      RE: Hussain Steven  [address]  [place] [address]       Hussain is a 25 year old who is being evaluated via a billable video visit.      How would you like to obtain your AVS? Mail a copy  If the video visit is dropped, the invitation should be resent by:  Will anyone else be joining your video visit? No    Video Start Time: 1505  Video-Visit Details    Type of service:  Video Visit    Video End Time:1521    Originating Location (pt. Location): Home    Distant Location (provider location):  [facility] [place]     Platform used for Video Visit: [employer]      CHRONIC TRANSPLANT NEPHROLOGY VISIT    Assessment & Plan   # LDKT: Stable   - Baseline Creatinine:  ~ 1.6-1.9   - Proteinuria: Minimal (0.2-0.5 grams)   - Date DSA Last Checked: Feb/2016      Latest DSA: No   - BK Viremia: Not checked recently due to time from transplant   - Kidney Tx Biopsy: Aug 26, 2011; Result: No diagnostic evidence of acute rejection.  Unremarkable             Apr 27, 2009; Result: Borderline acute cellular-mediated rejection.    # Immunosuppression: Everolimus (goal 4-6) and Prednisone (dose 5 mg daily)          - Continue with intensive monitoring of immunosuppression for efficacy and toxicity.          - Changes: Not at this time; With significant dyslipidemia, obesity and young age, would consider immunosuppression change consideration once he is further out from his PTLD.  Changes might include decreasing or stopping mTORi or prednisone for addition of mycophenolate.    # Infection Prophylaxis:   - PJP: Sulfa/TMP (Bactrim)    # Hypertension: Controlled;  Goal BP: < 130/80   - Changes: No, will continue on metoprolol due to higher heart rate when he is off beta blocker.3    # Mineral Bone Disorder:   - Vitamin D; level: Not checked recently        On supplement: No  - Calcium; level: Normal        On supplement: No    # Electrolytes:   - Potassium; level: Low normal        On supplement:  No  - Bicarbonate; level: Normal        On supplement: No    # Diffuse Large B-cell Lymphoma (PTLD): Patient was diagnosed 11/2019 with left-sided abdominal mass and weight loss.  He was treated with R-CHOP x 6 cycles followed by XRT 4/2020.  Patient's immunosuppression was changed from cyclosporine and mycophenolate mofetil to prednisone during chemotherapy and then started on everolimus along with prednisone after he was done with treatment.  Repeat CT/PET showed persistent update in right lower quadrant, although biopsy was benign.  No other evidence of recurrence at this time.  Patient just followed up with his Oncologist earlier today.    # Obesity, Class I (BMI = 33,5): His weight is up a few pounds after losing significant weight with his previous cancer diagnosis.   - Recommend weight loss for overall health by increasing exercise and watching caloric intake.    # Fatigue: No recent change, but with h/o of PTLD and young gentleman, would be concerned for infectious or other causes.   - Recommend checking CMV and EBV PCR, as well as TSH.    # Dyslipidemia: Poorly controlled, likely secondary to mTORi.  Recently started on atorvastatin.   - If PTLD remains in remission, could consider decreasing or stopping mTORi in the future if not controlled on statin.    # Skin Cancer Risk:    - Discussed sun protection and recommend regular follow up with Dermatology.    # Medical Compliance: Yes     # COVID-19 Virus Review: Discussed COVID-19 virus and the potential medical risks.  Reviewed preventative health recommendations, which includes washing hands for 20 seconds, avoid touching your face, and social distancing.  Asked patient to inform the transplant center if they are exposed or diagnosed with this virus.    # Transplant History:  Etiology of Kidney Failure: Congenital solitary kidney  Tx: LDKT  Transplant: 2/3/2009 (Kidney)  Significant changes in immunosuppression: Changed off CNI and antimetabolite due to  PTLD.  Significant transplant-related complications: Post-Transplant Lymphoproliferative Disorder (PTLD) and Acute cellular-mediated rejection    Transplant Office Phone Number: [phone]    Assessment and plan was discussed with the patient and he voiced his understanding and agreement.    Return visit: Return in about 1 year (around 2/23/2022).    James Dumont MD    Chief Complaint   Mr. Steven is a 25 year old here for kidney transplant and immunosuppression management.    History of Present Illness    Mr. Steven reports feeling pretty good overall with some medical complaints.  Since last clinic visit, patient reports no hospitalizations or new medical complaints and has been doing well overall.  He has a history of PTLD, diagnosed 11/2019.  Patient received R-CHOP x 6 cycles, as well as XRT.  His immunosuppression was changed to everolimus and prednisone.  Patient has no evidence of recurrence at this time.  He did have a right abdominal mass seen on latest CT/PET, but biopsy was benign.  Patient was seen by Oncology earlier today.  He appears to be having some side effects with everolimus as labs show significant dyslipidemia and patient was started on atorvastatin.    His energy level is low, although has been stable.  He is active, but really gets minimal exercise, especially during the cold winter months.  Denies any chest pain or shortness of breath with exertion.  Appetite is good and he has gained the weight back that he lost with the cancer.  No nausea, vomiting or diarrhea.  No fever, sweats or chills.  No leg swelling.    Home BP: 100/70s    Problem List   Patient Active Problem List   Diagnosis     HTN, kidney transplant related     Hydrocele, right     Kidney replaced by transplant     Aftercare following organ transplant     Immunosuppression (H)     CKD (chronic kidney disease) stage 3, GFR 30-59 ml/min     Dyslipidemia     PTLD (post-transplant lymphoproliferative disorder) (H)      GERD (gastroesophageal reflux disease)     Class 1 obesity in adult       Allergies   Allergies   Allergen Reactions     Lisinopril Other (See Comments)     headache       Medications   Current Outpatient Medications   Medication Sig     atorvastatin (LIPITOR) 20 MG tablet Take 1 tablet (20 mg) by mouth every evening     clotrimazole (LOTRIMIN) 1 % external cream Apply topically 2 times daily (Patient not taking: Reported on 8/24/2020)     everolimus (ZORTRESS) 0.5 MG tablet Take 2 tablets (1 mg) by mouth 2 times daily Total dose is 1.75 mg twice a day.     everolimus (ZORTRESS) 0.75 MG tablet Take 1 tablet (0.75 mg) by mouth 2 times daily Total dose is 1.75 mg twice a day     famotidine (PEPCID) 20 MG tablet Take 20 mg by mouth 2 times daily     metoprolol (LOPRESSOR) 100 MG tablet Take 1 tablet (100 mg) by mouth 2 times daily     predniSONE (DELTASONE) 5 MG tablet Take 1 tablet (5 mg) by mouth daily     sertraline (ZOLOFT) 100 MG tablet Take 100 mg by mouth daily     sulfamethoxazole-trimethoprim (BACTRIM/SEPTRA) 400-80 MG tablet Take 1 tablet by mouth twice a week Twice a week, decreased for low WBC     No current facility-administered medications for this visit.      Medications Discontinued During This Encounter   Medication Reason     losartan (COZAAR) 50 MG tablet      sodium bicarbonate 650 MG tablet        Physical Exam   Vital signs were deferred for this telemedicine visit.    GENERAL APPEARANCE: alert and no distress  HENT: no obvious abnormalities on appearance  RESP: breathing appears unremarkable with normal rate, no audible wheezing or cough and no apparent shortness of breath with conversation  MS: extremities normal - no gross deformities noted  SKIN: no apparent rash and normal skin tone  NEURO: speech is clear with no obvious neurological deficits  PSYCH: mentation appears normal and affect normal    Data     Renal Latest Ref Rng & Units 2/12/2021 12/15/2020 10/28/2020   Na mmol/L - - -   Na  (external) 134 - 143 mEq/L 140 143 142   K mmol/L - - -   K (external) 3.4 - 5.1 mEq/L 3.6 3.8 3.8   Cl mmol/L - - -   Cl (external) 99 - 110 mEq/L 103 104 106   CO2 mmol/L - - -   CO2 (external) 19 - 29 mEq/L 26 26 21   BUN mg/dL - - -   BUN (external) 5 - 24 mg/dL 20 17 20   Cr 0.50 - 1.00 mg/dL - - -   Cr (external) 0.70 - 1.20 mg/dL 1.75(H) 1.76(H) 1.64(H)   Glucose 60 - 99 mg/dL - - -   Glucose (external) 70 - 99 mg/dL 96 95 101(H)   Ca  mg/dL - - -   Ca (external) 8.4 - 10.5 mg/dL 9.7 10.1 9.7   Mg mg/dL - - -   Mg (external) 1.5 - 2.2 mEq/L - - -     Bone Health Latest Ref Rng & Units 9/18/2019 8/26/2019 7/11/2017   Phos mg/dL - - -   Phos (external) 2.5 - 46 mg/dL 3.5 - 3.9   PTHi 18 - 80 pg/mL - 12(L) -   Vit D Def 20 - 75 ug/L - 29 -   Vit D Def (external) 24.0 - 102.0 pg/mL 51.3 - -     Heme Latest Ref Rng & Units 2/12/2021 12/15/2020 10/28/2020   WBC 10:9/L - - -   WBC (external) 3.2 - 11.0 10*9/L 7.1 7.8 5.9   Hgb 13.3 - 17.7 g/dL - - -   Hgb (external) 12.9 - 16.9 g/dL 15.6 15.2 14.3   Plt 10:9/L - - -   Plt (external) 130 - 375 10*9/L 211 196 212   ABSOLUTE NEUTROPHIL - - - -   ABSOLUTE NEUTROPHILS (EXTERNAL) 1.5 - 7.6 10*9/L 5.2 5.7 4.2   ABSOLUTE LYMPHOCYTES - - - -   ABSOLUTE LYMPHOCYTES (EXTERNAL) 0.8 - 3.3 10*9/L 1.1 1.1 1.1   ABSOLUTE MONOCYTES 0.0 - 1.3 10e9/L - - -   ABSOLUTE MONOCYTES (EXTERNAL) 0.2 - 0.9 10*9/L 0.7 0.8 0.60   ABSOLUTE EOSINOPHILS 0.0 - 0.7 10e9/L - - -   ABSOLUTE EOSINOPHILS (EXTERNAL) 0.0 - 0.4 10*9/L 0.1 0.2 0.1   ABSOLUTE BASOPHILS 0.0 - 0.2 10e9/L - - -   ABSOLUTE BASOPHILS (EXTERNAL) 0.0 - 0.1 10*9/L 0.0 0.0 0.0   ABS IMMATURE GRANULOCYTES 0 - 0.03 10e9/L - - -     Liver Latest Ref Rng & Units 10/28/2020 6/26/2020 4/3/2020   AP 65 - 260 U/L - - -   AP (external) 40 - 150 IU/L 95 105 72   TBili 0.2 - 1.3 mg/dL - - -   TBili (external) 0.2 - 1.2 mg/dL 0.4 0.4 0.4   DBili (external) 0.0 - 0.5 mg/dL <0.2 - 0.2   ALT 0 - 50 U/L - - -   ALT (external) 6 - 40 IU/L 37 35 20    AST 0 - 45 U/L - - -   AST (external) 10 - 40 IU/L 23 24 18   Tot Protein 6.8 - 8.8 g/dL - - -   Tot Protein (external) 6.0 - 8.0 g/dL 7.4 7.9 6.9   Albumin 3.9 - 5.1 g/dL - - -   Albumin (external) 3.5 - 5.0 g/dL 4.0 4.3 3.6        Iron studies Latest Ref Rng & Units 6/26/2020 3/3/2020 1/29/2020   Iron 35 - 180 ug/dL - - -   Iron sat 15 - 46 % - - -   Ferritin 26 - 388 ng/mL - - -   Ferritin (external) 30 - 300 ng/mL 184 270 666(H)     [facility] Txp Virology Latest Ref Rng & Units 10/30/2019 8/26/2019 12/9/2013   CMV IgG EU/mL - - -   CMV IgM <0.90 - - -   CMV IgM Interp <0.90 - - -   CVM DNA Quant - - EDTA PLASMA Whole blood, EDTA anticoagulant   CMV Quant <100 Copies/mL - - <100   CMV QT Log <2.0 Log copies/mL - - <2.0  The Cytomegalovirus DNA Quantitation assay is a real-time polymerase chain   reaction (PCR) utilizing analyte specific reagents manufactured by [employer]   [facility]. Analyte Specific Reagents (ASRs) are used in many laboratory   tests necessary for standard medical care and generally do not require FDA   approval.   This test was developed and its performance characteristics determined by   [facility].  It has not been   cleared or approved by the US Food and Drug Administration.   CMV QUANT IU/ML CMVND:CMV DNA Not Detected [IU]/mL - CMV DNA Not Detected -   LOG IU/ML OF CMVQNT <2.1 [Log:IU]/mL - Not Calculated -   BK Spec - - - Plasma, EDTA anticoagulant  CORRECTED ON 12/16 AT 0902: PREVIOUSLY REPORTED AS Whole blood, EDTA   anticoagulant   BK Res <1000 copies/mL - - <1000   BK Log <3.0 Log copies/mL - - <3.0  The lower limit of detection for this assay is 1000 copies/mL.  Real-time TaqMan   PCR was performed using BK primers and probe for the detection of a 90 bp   portion of the  1 gene.  The performance characteristics were validated by   the [facility].   It has not been cleared or approved by the U.S. Food and  Drug Administration.   EBV IgG - - - -   EBV DNA COPIES/ML EBVNEG:EBV DNA Not Detected [Copies]/mL EBV DNA Not Detected EBV DNA Not Detected <1000   EBV DNA LOG OF COPIES <2.7 [Log:copies]/mL Not Calculated Not Calculated <3.0   Hep B Core NEG - - -   Hep B Surf - - - -   HIV 1&2 NEG - - -                James Dumont MD

## 2021-02-24 ENCOUNTER — TELEPHONE (OUTPATIENT)
Dept: TRANSPLANT | Facility: CLINIC | Age: 26
End: 2021-02-24

## 2021-02-24 PROBLEM — N18.30 ANEMIA OF CHRONIC RENAL FAILURE, STAGE 3 (MODERATE) (H): Status: RESOLVED | Noted: 2019-10-07 | Resolved: 2021-02-24

## 2021-02-24 PROBLEM — D63.1 ANEMIA OF CHRONIC RENAL FAILURE, STAGE 3 (MODERATE) (H): Status: RESOLVED | Noted: 2019-10-07 | Resolved: 2021-02-24

## 2021-02-24 PROBLEM — D47.Z1 PTLD (POST-TRANSPLANT LYMPHOPROLIFERATIVE DISORDER) (H): Status: ACTIVE | Noted: 2021-02-24

## 2021-02-24 PROBLEM — K21.9 GERD (GASTROESOPHAGEAL REFLUX DISEASE): Status: ACTIVE | Noted: 2021-02-24

## 2021-02-24 PROBLEM — D63.1 ANEMIA IN CHRONIC RENAL DISEASE: Status: RESOLVED | Noted: 2019-08-26 | Resolved: 2021-02-24

## 2021-02-24 PROBLEM — E78.5 DYSLIPIDEMIA: Status: ACTIVE | Noted: 2021-02-24

## 2021-02-24 PROBLEM — N18.9 ANEMIA IN CHRONIC RENAL DISEASE: Status: RESOLVED | Noted: 2019-08-26 | Resolved: 2021-02-24

## 2021-02-24 PROBLEM — E66.811 CLASS 1 OBESITY IN ADULT: Status: ACTIVE | Noted: 2021-02-24

## 2021-02-24 NOTE — TELEPHONE ENCOUNTER
James Dumont MD Harris, Kathleen, RN             Please check the following labs: Vitamin D, fasting lipids, CMV, and EBV PCR and TSH.      OUTCOME  Lab orders sent to local lab.  My Chart sent to Hussain.

## 2021-02-24 NOTE — LETTER
PHYSICIAN ORDERS      DATE & TIME ISSUED: 2021 2:43 PM  PATIENT NAME: Hussain Steven   : 1995     [facility] MR# [if applicable]: [id number]     DIAGNOSIS:  Kidney transplant/Aftercare following organ transplant/Long term  use of high risk medications.  ICD-10 CODE: Z94.0/Z48.298/Z79.899     Please collect the following labs in the next month:   -Vitamin D Level   -Fasting lipid panel   -CMV DNA quantification   -EBV DNA PCR quantification whole blood   -TSH       Any questions please call: [phone]    PLEASE FAX ALL LAB RESULTS -[phone]    .

## 2021-03-12 DIAGNOSIS — Z94.0 KIDNEY REPLACED BY TRANSPLANT: ICD-10-CM

## 2021-03-12 DIAGNOSIS — Z79.899 ENCOUNTER FOR LONG-TERM CURRENT USE OF MEDICATION: ICD-10-CM

## 2021-03-12 DIAGNOSIS — Z48.298 AFTERCARE FOLLOWING ORGAN TRANSPLANT: ICD-10-CM

## 2021-03-12 PROCEDURE — 80169 DRUG ASSAY EVEROLIMUS: CPT | Performed by: INTERNAL MEDICINE

## 2021-03-15 ENCOUNTER — TELEPHONE (OUTPATIENT)
Dept: TRANSPLANT | Facility: CLINIC | Age: 26
End: 2021-03-15

## 2021-03-15 NOTE — TELEPHONE ENCOUNTER
ISSUE  Vitamin D Level 11  ALT elevated at 67  Lipid improved after starting atorvastatin      PLAN    James Dumont MD Harris, Kathleen, RN             Would repeat LFTs in one month.  No vitamin D supplement for now as his calcium level is high.      OUTCOME  Orders sent for repeat LFT's.  My chart message sent to Hussain.

## 2021-03-15 NOTE — LETTER
PHYSICIAN ORDERS      DATE & TIME ISSUED: March 15, 2021 9:37 AM  PATIENT NAME: Hussain Steven   : 1995     [facility] MR# [if applicable]: [id number]     DIAGNOSIS:  Kidney transplant/Aftercare following organ transplant/Long term  use of high risk medications.  ICD-10 CODE: Z94.0/Z48.298/Z79.899     Please collect the following labs in one month:   -BMP   -CBC   -Everolimus level   -Hepatic panel     Any questions please call: [phone]    PLEASE FAX ALL LAB RESULTS -[phone]    .

## 2021-03-19 LAB
EVEROLIMUS BLD-MCNC: 4.4 UG/L (ref 3–8)
TME LAST DOSE: NORMAL H

## 2021-05-14 PROCEDURE — 80169 DRUG ASSAY EVEROLIMUS: CPT | Performed by: INTERNAL MEDICINE

## 2021-05-18 DIAGNOSIS — Z48.298 AFTERCARE FOLLOWING ORGAN TRANSPLANT: Primary | ICD-10-CM

## 2021-05-19 ENCOUNTER — TELEPHONE (OUTPATIENT)
Dept: TRANSPLANT | Facility: CLINIC | Age: 26
End: 2021-05-19

## 2021-05-19 LAB
EVEROLIMUS BLD-MCNC: 4.9 UG/L (ref 3–8)
TME LAST DOSE: NORMAL H

## 2021-05-19 NOTE — LETTER
OUTPATIENT LABORATORY TEST ORDER    Patient Name: Hussain Steven                   Transplant Date: 02-  YOB: 1995                                     Issue Date & Time: 5/19/2021  9:53 AM  [facility] MR: [id number]                                 Exp. Date (1 year after date issued)      Diagnoses: Kidney Transplant (ICD-10 Z94.0)   Long term use of medications (ICD-10  Z79.899)     Lab results to be available on the same day drawn.   Patient should release information to the [facility].  Please fax to the Transplant Center at [phone].      Every 2  Month(s)  - CBC and Platelet  - Basic Metabolic Panel (Sodium, Potassium, Chloride, CO2, Creatinine, Urea Nitrogen, Glucose, Calcium)          - Everolimus drug level              Every 6 Months       Due:  September 2021/March 2022                                     -Urine for protein/creatinine   -Lipid panel   -Liver function panel          If you have any questions, please call The Transplant Center at [phone] or [phone].    Please fax labs to [phone]  .

## 2021-05-19 NOTE — LETTER
PHYSICIAN ORDERS      DATE & TIME ISSUED: May 19, 2021 9:57 AM  PATIENT NAME: Hussain Steven   : 1995     [facility] MR# [if applicable]: [id number]     DIAGNOSIS:  Kidney transplant/Aftercare following organ transplant/Long term  use of high risk medications.  ICD-10 CODE: Z94.0/Z48.298/Z79.899     Please collect the following with next set of transplant labs in July:   -Liver function panel        Any questions please call: [phone]    PLEASE FAX ALL LAB RESULTS -[phone]    .

## 2021-05-19 NOTE — TELEPHONE ENCOUNTER
ISSUE  ALT remains elevated at 66 on recheck.  Currently on Everolimus and Atorvastatin.  Other labs at baseline.  Message sent to provider.    PLAN  James Dumont MD Harris, Kathleen, RN             No changes and would just recheck LFTs in 3 months.      OUTCOME  Lab orders sent.  My Chart message sent to Hussain.

## 2021-06-25 DIAGNOSIS — Z48.298 AFTERCARE FOLLOWING ORGAN TRANSPLANT: ICD-10-CM

## 2021-06-25 PROCEDURE — 80169 DRUG ASSAY EVEROLIMUS: CPT | Performed by: INTERNAL MEDICINE

## 2021-07-02 LAB
EVEROLIMUS BLD-MCNC: 4.7 UG/L (ref 3–8)
TME LAST DOSE: NORMAL H

## 2021-09-16 ENCOUNTER — TELEPHONE (OUTPATIENT)
Dept: NEPHROLOGY | Facility: CLINIC | Age: 26
End: 2021-09-16
Payer: COMMERCIAL

## 2021-09-16 NOTE — TELEPHONE ENCOUNTER
PA Initiation    Medication: Everolimus 0.75MG & 0.5MG - PA Initiated  Insurance Company: [employer] - Phone [phone] Fax [phone]  Pharmacy Filling the Rx:  [facility]  Filling Pharmacy Phone:    Filling Pharmacy Fax:    Start Date: 9/16/2021

## 2021-09-17 NOTE — TELEPHONE ENCOUNTER
PRIOR AUTHORIZATION DENIED    Medication: Everolimus 0.75mg - PA Denied    Denial Date:  9/16/21    Denial Rational: Coverage is provided if prescribed for the following: treatment of Angiomyolipoma of kidney secondary to Tuberour sclerosis syndrome, advanced breast cancer, liver transplant rejection, neuroendocrine tumor, partial seizure secondary to tuberous sclerosis syndrome, renal cell carcinoma, or subependymal giant cell astrocytoma secondary to tuberous sclerosis syndrome.    Appeal Information:

## 2021-09-17 NOTE — TELEPHONE ENCOUNTER
Spoke to patient about the denial of 0.75mg dose with [employer]. Patient states their [employer] insurance will end on 9/31. They have new insurance through [facility] effective 9/1/21. ID#[id number].  Test claim with [facility] insurance = $2491.67 (applied toward their deductible/oop) for 90 days and no PA was needed for the 0.75mg or 0.5mg. Patient reports that the high copay was not a concern at this time.  Patient will reach out to the pharmacy and provide new insurance info to have pharmacy bill meds to [facility] instead.    Patient will reach out to liaison if they have any other questions/concerns.

## 2021-09-17 NOTE — TELEPHONE ENCOUNTER
Prior Authorization Approval    Authorization Effective Date:  8/17/21  Authorization Expiration Date:  9/16/22  Medication: Everolimus 0.5MG - PA Approved  Approved Dose/Quantity:   Reference #:     Insurance Company: [employer] - [employer] [place] [employer] [phone] Fax [phone]  Expected CoPay:       CoPay Card Available:      Foundation Assistance Needed:    Which Pharmacy is filling the prescription (Not needed for infusion/clinic administered):    Pharmacy Notified:    Patient Notified:  Left message to advise patient that 0.5mg was approved but not the 0.75mg and we will work on appealing.

## 2021-09-26 ENCOUNTER — HEALTH MAINTENANCE LETTER (OUTPATIENT)
Age: 26
End: 2021-09-26

## 2021-10-01 ENCOUNTER — LAB (OUTPATIENT)
Dept: LAB | Facility: CLINIC | Age: 26
End: 2021-10-01
Payer: COMMERCIAL

## 2021-10-01 DIAGNOSIS — Z48.298 AFTERCARE FOLLOWING ORGAN TRANSPLANT: ICD-10-CM

## 2021-10-01 PROCEDURE — 84999 UNLISTED CHEMISTRY PROCEDURE: CPT | Performed by: INTERNAL MEDICINE

## 2021-10-01 PROCEDURE — 80169 DRUG ASSAY EVEROLIMUS: CPT | Performed by: INTERNAL MEDICINE

## 2021-10-06 LAB
Lab: NORMAL
PERFORMING LABORATORY: NORMAL
SPECIMEN STATUS: NORMAL
TEST NAME: NORMAL

## 2021-10-07 NOTE — LETTER
Instructed to call immediately if any new distortion, blurring, decreased vision or eye pain. PHYSICIAN ORDERS      DATE & TIME ISSUED: 2019 12:16 PM  PATIENT NAME: Hussain Steven   : 1995     [facility] MR# [if applicable]: [id number]     DIAGNOSIS:  Kidney transplant  ICD-10 CODE: Z94.0     Please obtain the following labs    EBV DNA PCR Quantitative Whole Blood   Lactate dehydrogenase (LDH)    Any questions please call: [phone]  Please fax results to [phone].    .

## 2021-10-08 LAB — MISCELLANEOUS TEST 1 (ARUP): NORMAL

## 2022-01-15 ENCOUNTER — HEALTH MAINTENANCE LETTER (OUTPATIENT)
Age: 27
End: 2022-01-15

## 2022-01-26 ENCOUNTER — LAB (OUTPATIENT)
Dept: LAB | Facility: CLINIC | Age: 27
End: 2022-01-26
Payer: COMMERCIAL

## 2022-01-26 DIAGNOSIS — Z48.298 AFTERCARE FOLLOWING ORGAN TRANSPLANT: ICD-10-CM

## 2022-01-26 PROCEDURE — 80169 DRUG ASSAY EVEROLIMUS: CPT

## 2022-01-26 PROCEDURE — 36415 COLL VENOUS BLD VENIPUNCTURE: CPT

## 2022-01-31 LAB
EVEROLIMUS BLD-MCNC: 4.7 UG/L (ref 3–8)
TME LAST DOSE: NORMAL H
TME LAST DOSE: NORMAL H

## 2022-02-01 ENCOUNTER — VIRTUAL VISIT (OUTPATIENT)
Dept: NEPHROLOGY | Facility: CLINIC | Age: 27
End: 2022-02-01
Attending: INTERNAL MEDICINE
Payer: COMMERCIAL

## 2022-02-01 DIAGNOSIS — Z94.0 HTN, KIDNEY TRANSPLANT RELATED: ICD-10-CM

## 2022-02-01 DIAGNOSIS — N18.31 STAGE 3A CHRONIC KIDNEY DISEASE (H): ICD-10-CM

## 2022-02-01 DIAGNOSIS — I15.1 HTN, KIDNEY TRANSPLANT RELATED: ICD-10-CM

## 2022-02-01 DIAGNOSIS — D84.9 IMMUNOSUPPRESSION (H): ICD-10-CM

## 2022-02-01 DIAGNOSIS — Z48.298 AFTERCARE FOLLOWING ORGAN TRANSPLANT: ICD-10-CM

## 2022-02-01 DIAGNOSIS — Z29.89 NEED FOR PNEUMOCYSTIS PROPHYLAXIS: ICD-10-CM

## 2022-02-01 DIAGNOSIS — Z94.0 KIDNEY REPLACED BY TRANSPLANT: Primary | ICD-10-CM

## 2022-02-01 PROCEDURE — 99214 OFFICE O/P EST MOD 30 MIN: CPT | Mod: 95 | Performed by: INTERNAL MEDICINE

## 2022-02-01 NOTE — LETTER
2/1/2022       RE: Hussain Steven  [address]  [place] [address]     Dear Colleague,    Thank you for referring your patient, Hussain Steven, to the [facility] [place] [facility]. Please see a copy of my visit note below.    Hussain is a 25 year old who is being evaluated via a billable video visit.      How would you like to obtain your AVS? Mail a copy  If the video visit is dropped, the invitation should be resent by:  Will anyone else be joining your video visit? No    Video Start Time: 1309  Video-Visit Details    Type of service:  Video Visit    Video End Time:1319    Originating Location (pt. Location): Home    Distant Location (provider location):  [facility] [place]     Platform used for Video Visit: Taggled      CHRONIC TRANSPLANT NEPHROLOGY VISIT    Assessment & Plan   # LDKT: Stable   - Baseline Creatinine:  ~ 1.6-1.9   - Proteinuria: Mild (0.5-1.0 grams)   - Date DSA Last Checked: Feb/2016      Latest DSA: Not checked recently due to time from transplant   - BK Viremia: Not checked recently due to time from transplant   - Kidney Tx Biopsy: Aug 26, 2011; Result: No diagnostic evidence of acute rejection.  Unremarkable             Apr 27, 2009; Result: Borderline acute cellular-mediated rejection.    # Immunosuppression: Everolimus (goal 4-6) and Prednisone (dose 5 mg daily)          - Continue with intensive monitoring of immunosuppression for efficacy and toxicity.          - Changes: Not at this time; With significant dyslipidemia, obesity and young age, would consider immunosuppression change consideration once he is further out from his PTLD.  Changes might include decreasing or stopping mTORi or prednisone for addition of mycophenolate.    # Infection Prophylaxis:   - PJP: Sulfa/TMP (Bactrim)    # Hypertension: Controlled;  Goal BP: < 130/80   - Changes: No, will continue on metoprolol due  to higher heart rate when he is off beta blocker.    # Mineral Bone Disorder:   - Vitamin D; level: Low        On supplement: No; Not on cholecalciferol due to higher serum calcium level.  - Calcium; level: Normal        On supplement: No    # Electrolytes:   - Potassium; level: Normal        On supplement: No  - Bicarbonate; level: Normal        On supplement: No    # Diffuse Large B-cell Lymphoma (PTLD): Patient was diagnosed 11/2019 with left-sided abdominal mass and weight loss.  He was treated with R-CHOP x 6 cycles followed by XRT 4/2020.  Patient's immunosuppression was changed from cyclosporine and mycophenolate mofetil to prednisone during chemotherapy and then started on everolimus along with prednisone after he was done with treatment.  Repeat CT/PET showed persistent update in right lower quadrant, although biopsy was benign.  No evidence of recurrence on latest PET/CT 11/2021.  Patient is followed by Oncology.    # Obesity, Class II (BMI = 35.9): His weight is stable lately, but has increased significantly following recover from his cancer diagnosis.   - Recommend weight loss for overall health by increasing exercise and watching caloric intake.   - Recommend patient be referred to local Weight Management Clinic.    # Fatigue: No recent change.  Previously negative CMV and EBV PCR.    # Dyslipidemia: Improved cholesterol on statin.  Now mostly hypertriglyceridemia, likely due to mTORi.   - If PTLD remains in remission, could consider decreasing or stopping mTORi in the future if not controlled on statin.    # Abnormal LFTs: Stable, mildly elevated ALT.  Likely due to fatty liver disease with patient's obesity.   - Recommend weight loss and will follow.    # Skin Cancer Risk:    - Discussed sun protection and recommend regular follow up with Dermatology.    # Medical Compliance: Yes     # COVID-19 Virus Review: Discussed COVID-19 virus and the potential medical risks.  Reviewed preventative health  "recommendations, including wearing a mask where appropriate.  Recommended COVID vaccination should be up to date with either an initial vaccination or booster shot when appropriate.  Asked the patient to inform the transplant center if they are exposed or diagnosed with this virus.    # COVID Vaccination Up To Date: Yes    # Transplant History:  Etiology of Kidney Failure: Congenital solitary kidney  Tx: LDKT  Transplant: 2/3/2009 (Kidney)  Significant changes in immunosuppression: Changed off CNI and antimetabolite due to PTLD.  Significant transplant-related complications: Post-Transplant Lymphoproliferative Disorder (PTLD) and Acute cellular-mediated rejection    Transplant Office Phone Number: [phone]    Assessment and plan was discussed with the patient and he voiced his understanding and agreement.    Return visit: Return in about 1 year (around 2/1/2023).    James Dumont MD    Chief Complaint   Mr. Steven is a 26 year old here for kidney transplant and immunosuppression management.    History of Present Illness    Mr. Steven reports feeling okay overall with some medical complaints.  Since last clinic visit, patient reports no hospitalizations or new medical complaints and has been doing well overall.  He is now working as a  for a law firm.  His energy level remains low, but no recent change.  He is active and does get some exercise as he has a pedal machine under his desk.  Denies any chest pain or shortness of breath with exertion.  No leg swelling.    Appetite is \"fair\" as he eats generally 2 meals a day.  His weight has been stable recently, but he is up about 30 lbs in the last year to ~ 280 lbs.  No nausea, vomiting or diarrhea.  No fever, sweats or chills.    Home BP: 120/70s    Problem List   Patient Active Problem List   Diagnosis     HTN, kidney transplant related     Hydrocele, right     Kidney replaced by transplant     Aftercare following organ transplant     " Immunosuppression (H)     CKD (chronic kidney disease) stage 3, GFR 30-59 ml/min (H)     Dyslipidemia     PTLD (post-transplant lymphoproliferative disorder) (H)     GERD (gastroesophageal reflux disease)     Class 1 obesity in adult     Need for pneumocystis prophylaxis       Allergies   Allergies   Allergen Reactions     Lisinopril Other (See Comments)     headache       Medications   Current Outpatient Medications   Medication Sig     atorvastatin (LIPITOR) 20 MG tablet Take 1 tablet (20 mg) by mouth every evening     everolimus (ZORTRESS) 0.5 MG tablet Take 2 tablets (1 mg) by mouth 2 times daily Total dose is 1.75 mg twice a day.     everolimus (ZORTRESS) 0.75 MG tablet Take 1 tablet (0.75 mg) by mouth 2 times daily Total dose is 1.75 mg twice a day     famotidine (PEPCID) 20 MG tablet Take 20 mg by mouth 2 times daily     metoprolol (LOPRESSOR) 100 MG tablet Take 1 tablet (100 mg) by mouth 2 times daily     predniSONE (DELTASONE) 5 MG tablet Take 1 tablet (5 mg) by mouth daily     sertraline (ZOLOFT) 100 MG tablet Take 100 mg by mouth daily     sulfamethoxazole-trimethoprim (BACTRIM/SEPTRA) 400-80 MG tablet Take 1 tablet by mouth twice a week Twice a week, decreased for low WBC     No current facility-administered medications for this visit.     There are no discontinued medications.    Physical Exam   Vital signs were deferred for this telemedicine visit.    GENERAL APPEARANCE: alert and no distress  HENT: no obvious abnormalities on appearance  RESP: breathing appears unremarkable with normal rate, no audible wheezing or cough and no apparent shortness of breath with conversation  MS: extremities normal - no gross deformities noted  SKIN: no apparent rash and normal skin tone  NEURO: speech is clear with no obvious neurological deficits  PSYCH: mentation appears normal and affect normal    Data     Renal Latest Ref Rng & Units 1/26/2022 10/1/2021 6/25/2021   Na mmol/L - - -   Na (external) 134 - 143 mEq/L  139 138 141   K mmol/L - - -   K (external) 3.4 - 5.1 mEq/L 4.2 4.2 3.9   Cl mmol/L - - -   Cl (external) 99 - 110 mEq/L 103 106 108   CO2 mmol/L - - -   CO2 (external) 19 - 29 mEq/L 26 23 23   BUN mg/dL - - -   BUN (external) 5 - 24 mg/dL 20 20 21   Cr 0.50 - 1.00 mg/dL - - -   Cr (external) 0.70 - 1.20 mg/dL 1.59(H) 1.48(H) 1.65(H)   Glucose 60 - 99 mg/dL - - -   Glucose (external) 70 - 99 mg/dL 96 103(H) 95   Ca  mg/dL - - -   Ca (external) 8.4 - 105 mg/dL 9.9 10.1 9.8   Mg mg/dL - - -   Mg (external) 1.5 - 2.2 mEq/L - - -     Bone Health Latest Ref Rng & Units 3/12/2021 9/18/2019 8/26/2019   Phos mg/dL - - -   Phos (external) 2.5 - 46 mg/dL - 3.5 -   PTHi 18 - 80 pg/mL - - 12(L)   Vit D Def 20 - 75 ug/L - - 29   Vit D Def (external) 27 - 80 ng/mL 11(L) 51.3 -     Heme Latest Ref Rng & Units 1/26/2022 10/1/2021 6/25/2021   WBC 10:9/L - - -   WBC (external) 3.2 - 11.0 10*9/L 6.4 6.4 6.2   Hgb 13.3 - 17.7 g/dL - - -   Hgb (external) 12.9 - 16.9 g/dL 15.6 15.2 14.4   Plt 10:9/L - - -   Plt (external) 130 - 375 10*9/L 212 228 201   ABSOLUTE NEUTROPHIL - - - -   ABSOLUTE NEUTROPHILS (EXTERNAL) 1.5 - 7.6 10*9/L 4.5 4.8 4.6   ABSOLUTE LYMPHOCYTES - - - -   ABSOLUTE LYMPHOCYTES (EXTERNAL) 0.8 - 3.3 10*9/L 1.2 0.9 0.9   ABSOLUTE MONOCYTES 0.0 - 1.3 10e9/L - - -   ABSOLUTE MONOCYTES (EXTERNAL) 0.2 - 0.9 10*9/L 0.6 0.6 0.5   ABSOLUTE EOSINOPHILS 0.0 - 0.7 10e9/L - - -   ABSOLUTE EOSINOPHILS (EXTERNAL) 0.0 - 0.4 10*9/L 0.1 0.1 0.1   ABSOLUTE BASOPHILS 0.0 - 0.2 10e9/L - - -   ABSOLUTE BASOPHILS (EXTERNAL) 0.0 - 0.1 10*9/L 0.0 0.0 0.0   ABS IMMATURE GRANULOCYTES 0 - 0.03 10e9/L - - -     Liver Latest Ref Rng & Units 1/26/2022 10/1/2021 5/14/2021   AP 65 - 260 U/L - - -   AP (external) 40 - 150 U/L 98 96 100   TBili 0.2 - 1.3 mg/dL - - -   TBili (external) 0.2 - 1.2 mg/dL 0.5 <0.5 0.4   DBili (external) 0.0 - 0.5 mg/dL 0.2 0.3 <0.2   ALT 0 - 50 U/L - - -   ALT (external) 6 - 40 IU/L 52(H) 35 66(H)   AST 0 - 45 U/L - - -   AST  (external) 10 - 40 IU/L 39 23 39   Tot Protein 6.8 - 8.8 g/dL - - -   Tot Protein (external) 6.0 - 8.0 g/dL 7.2 7.6 7.0   Albumin 3.9 - 5.1 g/dL - - -   Albumin (external) 3.5 - 5.0 g/dL 4.1 4.1 3.9        Iron studies Latest Ref Rng & Units 6/26/2020 3/3/2020 1/29/2020   Iron 35 - 180 ug/dL - - -   Iron sat 15 - 46 % - - -   Ferritin 26 - 388 ng/mL - - -   Ferritin (external) 30 - 300 ng/mL 184 270 666(H)     [facility] Txp Virology Latest Ref Rng & Units 3/12/2021 10/30/2019 8/26/2019   CMV IgG EU/mL - - -   CMV IgM <0.90 - - -   CMV IgM Interp <0.90 - - -   CVM DNA Quant - - - EDTA PLASMA   CMV DNA Quant Ext Undetected IU/mL Undetected - -   CMV Quant <100 Copies/mL - - -   CMV QT Log <2.0 Log copies/mL - - -   CMV QUANT IU/ML CMVND:CMV DNA Not Detected [IU]/mL - - CMV DNA Not Detected   LOG IU/ML OF CMVQNT <2.1 [Log:IU]/mL - - Not Calculated   LOG IU/ML OF CMVQNT (EXTERNAL) log IU/mL Undetected - -   BK Spec - - - -   BK Res <1000 copies/mL - - -   BK Log <3.0 Log copies/mL - - -   EBV IgG - - - -   EBV DNA QUANT (EXTERNAL) Undetected IU/mL Undetected - -   EBV DNA COPIES/ML EBVNEG:EBV DNA Not Detected [Copies]/mL - EBV DNA Not Detected EBV DNA Not Detected   EBV INTERP DNA QUANT (EXTERNAL) Undetected Undetected - -   EBV DNA LOG OF COPIES <2.7 [Log:copies]/mL - Not Calculated Not Calculated   EBV DNA LOG OF COPIES (EXTERNAL) Undetected log IU/mL Undetected - -   Hep B Core NEG - - -   Hep B Surf - - - -   HIV 1&2 NEG - - -                    Again, thank you for allowing me to participate in the care of your patient.      Sincerely,    James Dumont MD

## 2022-02-01 NOTE — LETTER
2/1/2022    RE: Hussain Steven  [address]  [place] [address]     Hussain is a 25 year old who is being evaluated via a billable video visit.      How would you like to obtain your AVS? Mail a copy  If the video visit is dropped, the invitation should be resent by:  Will anyone else be joining your video visit? No    Video Start Time: 1309  Video-Visit Details    Type of service:  Video Visit    Video End Time:1319    Originating Location (pt. Location): Home    Distant Location (provider location):  [facility] [place]     Platform used for Video Visit: [employer]      CHRONIC TRANSPLANT NEPHROLOGY VISIT    Assessment & Plan   # LDKT: Stable   - Baseline Creatinine:  ~ 1.6-1.9   - Proteinuria: Mild (0.5-1.0 grams)   - Date DSA Last Checked: Feb/2016      Latest DSA: Not checked recently due to time from transplant   - BK Viremia: Not checked recently due to time from transplant   - Kidney Tx Biopsy: Aug 26, 2011; Result: No diagnostic evidence of acute rejection.  Unremarkable             Apr 27, 2009; Result: Borderline acute cellular-mediated rejection.    # Immunosuppression: Everolimus (goal 4-6) and Prednisone (dose 5 mg daily)          - Continue with intensive monitoring of immunosuppression for efficacy and toxicity.          - Changes: Not at this time; With significant dyslipidemia, obesity and young age, would consider immunosuppression change consideration once he is further out from his PTLD.  Changes might include decreasing or stopping mTORi or prednisone for addition of mycophenolate.    # Infection Prophylaxis:   - PJP: Sulfa/TMP (Bactrim)    # Hypertension: Controlled;  Goal BP: < 130/80   - Changes: No, will continue on metoprolol due to higher heart rate when he is off beta blocker.    # Mineral Bone Disorder:   - Vitamin D; level: Low        On supplement: No; Not on cholecalciferol due to higher serum calcium level.  - Calcium; level: Normal        On supplement:  No    # Electrolytes:   - Potassium; level: Normal        On supplement: No  - Bicarbonate; level: Normal        On supplement: No    # Diffuse Large B-cell Lymphoma (PTLD): Patient was diagnosed 11/2019 with left-sided abdominal mass and weight loss.  He was treated with R-CHOP x 6 cycles followed by XRT 4/2020.  Patient's immunosuppression was changed from cyclosporine and mycophenolate mofetil to prednisone during chemotherapy and then started on everolimus along with prednisone after he was done with treatment.  Repeat CT/PET showed persistent update in right lower quadrant, although biopsy was benign.  No evidence of recurrence on latest PET/CT 11/2021.  Patient is followed by Oncology.    # Obesity, Class II (BMI = 35.9): His weight is stable lately, but has increased significantly following recover from his cancer diagnosis.   - Recommend weight loss for overall health by increasing exercise and watching caloric intake.   - Recommend patient be referred to local Weight Management Clinic.    # Fatigue: No recent change.  Previously negative CMV and EBV PCR.    # Dyslipidemia: Improved cholesterol on statin.  Now mostly hypertriglyceridemia, likely due to mTORi.   - If PTLD remains in remission, could consider decreasing or stopping mTORi in the future if not controlled on statin.    # Abnormal LFTs: Stable, mildly elevated ALT.  Likely due to fatty liver disease with patient's obesity.   - Recommend weight loss and will follow.    # Skin Cancer Risk:    - Discussed sun protection and recommend regular follow up with Dermatology.    # Medical Compliance: Yes     # COVID-19 Virus Review: Discussed COVID-19 virus and the potential medical risks.  Reviewed preventative health recommendations, including wearing a mask where appropriate.  Recommended COVID vaccination should be up to date with either an initial vaccination or booster shot when appropriate.  Asked the patient to inform the transplant center if they  "are exposed or diagnosed with this virus.    # COVID Vaccination Up To Date: Yes    # Transplant History:  Etiology of Kidney Failure: Congenital solitary kidney  Tx: LDKT  Transplant: 2/3/2009 (Kidney)  Significant changes in immunosuppression: Changed off CNI and antimetabolite due to PTLD.  Significant transplant-related complications: Post-Transplant Lymphoproliferative Disorder (PTLD) and Acute cellular-mediated rejection    Transplant Office Phone Number: [phone]    Assessment and plan was discussed with the patient and he voiced his understanding and agreement.    Return visit: Return in about 1 year (around 2/1/2023).    James Dumont MD    Chief Complaint   Mr. Steven is a 26 year old here for kidney transplant and immunosuppression management.    History of Present Illness    Mr. Steven reports feeling okay overall with some medical complaints.  Since last clinic visit, patient reports no hospitalizations or new medical complaints and has been doing well overall.  He is now working as a  for a law firm.  His energy level remains low, but no recent change.  He is active and does get some exercise as he has a pedal machine under his desk.  Denies any chest pain or shortness of breath with exertion.  No leg swelling.    Appetite is \"fair\" as he eats generally 2 meals a day.  His weight has been stable recently, but he is up about 30 lbs in the last year to ~ 280 lbs.  No nausea, vomiting or diarrhea.  No fever, sweats or chills.    Home BP: 120/70s    Problem List   Patient Active Problem List   Diagnosis     HTN, kidney transplant related     Hydrocele, right     Kidney replaced by transplant     Aftercare following organ transplant     Immunosuppression (H)     CKD (chronic kidney disease) stage 3, GFR 30-59 ml/min (H)     Dyslipidemia     PTLD (post-transplant lymphoproliferative disorder) (H)     GERD (gastroesophageal reflux disease)     Class 1 obesity in adult     Need " for pneumocystis prophylaxis       Allergies   Allergies   Allergen Reactions     Lisinopril Other (See Comments)     headache       Medications   Current Outpatient Medications   Medication Sig     atorvastatin (LIPITOR) 20 MG tablet Take 1 tablet (20 mg) by mouth every evening     everolimus (ZORTRESS) 0.5 MG tablet Take 2 tablets (1 mg) by mouth 2 times daily Total dose is 1.75 mg twice a day.     everolimus (ZORTRESS) 0.75 MG tablet Take 1 tablet (0.75 mg) by mouth 2 times daily Total dose is 1.75 mg twice a day     famotidine (PEPCID) 20 MG tablet Take 20 mg by mouth 2 times daily     metoprolol (LOPRESSOR) 100 MG tablet Take 1 tablet (100 mg) by mouth 2 times daily     predniSONE (DELTASONE) 5 MG tablet Take 1 tablet (5 mg) by mouth daily     sertraline (ZOLOFT) 100 MG tablet Take 100 mg by mouth daily     sulfamethoxazole-trimethoprim (BACTRIM/SEPTRA) 400-80 MG tablet Take 1 tablet by mouth twice a week Twice a week, decreased for low WBC     No current facility-administered medications for this visit.     There are no discontinued medications.    Physical Exam   Vital signs were deferred for this telemedicine visit.    GENERAL APPEARANCE: alert and no distress  HENT: no obvious abnormalities on appearance  RESP: breathing appears unremarkable with normal rate, no audible wheezing or cough and no apparent shortness of breath with conversation  MS: extremities normal - no gross deformities noted  SKIN: no apparent rash and normal skin tone  NEURO: speech is clear with no obvious neurological deficits  PSYCH: mentation appears normal and affect normal    Data     Renal Latest Ref Rng & Units 1/26/2022 10/1/2021 6/25/2021   Na mmol/L - - -   Na (external) 134 - 143 mEq/L 139 138 141   K mmol/L - - -   K (external) 3.4 - 5.1 mEq/L 4.2 4.2 3.9   Cl mmol/L - - -   Cl (external) 99 - 110 mEq/L 103 106 108   CO2 mmol/L - - -   CO2 (external) 19 - 29 mEq/L 26 23 23   BUN mg/dL - - -   BUN (external) 5 - 24 mg/dL 20 20  21   Cr 0.50 - 1.00 mg/dL - - -   Cr (external) 0.70 - 1.20 mg/dL 1.59(H) 1.48(H) 1.65(H)   Glucose 60 - 99 mg/dL - - -   Glucose (external) 70 - 99 mg/dL 96 103(H) 95   Ca  mg/dL - - -   Ca (external) 8.4 - 105 mg/dL 9.9 10.1 9.8   Mg mg/dL - - -   Mg (external) 1.5 - 2.2 mEq/L - - -     Bone Health Latest Ref Rng & Units 3/12/2021 9/18/2019 8/26/2019   Phos mg/dL - - -   Phos (external) 2.5 - 46 mg/dL - 3.5 -   PTHi 18 - 80 pg/mL - - 12(L)   Vit D Def 20 - 75 ug/L - - 29   Vit D Def (external) 27 - 80 ng/mL 11(L) 51.3 -     Heme Latest Ref Rng & Units 1/26/2022 10/1/2021 6/25/2021   WBC 10:9/L - - -   WBC (external) 3.2 - 11.0 10*9/L 6.4 6.4 6.2   Hgb 13.3 - 17.7 g/dL - - -   Hgb (external) 12.9 - 16.9 g/dL 15.6 15.2 14.4   Plt 10:9/L - - -   Plt (external) 130 - 375 10*9/L 212 228 201   ABSOLUTE NEUTROPHIL - - - -   ABSOLUTE NEUTROPHILS (EXTERNAL) 1.5 - 7.6 10*9/L 4.5 4.8 4.6   ABSOLUTE LYMPHOCYTES - - - -   ABSOLUTE LYMPHOCYTES (EXTERNAL) 0.8 - 3.3 10*9/L 1.2 0.9 0.9   ABSOLUTE MONOCYTES 0.0 - 1.3 10e9/L - - -   ABSOLUTE MONOCYTES (EXTERNAL) 0.2 - 0.9 10*9/L 0.6 0.6 0.5   ABSOLUTE EOSINOPHILS 0.0 - 0.7 10e9/L - - -   ABSOLUTE EOSINOPHILS (EXTERNAL) 0.0 - 0.4 10*9/L 0.1 0.1 0.1   ABSOLUTE BASOPHILS 0.0 - 0.2 10e9/L - - -   ABSOLUTE BASOPHILS (EXTERNAL) 0.0 - 0.1 10*9/L 0.0 0.0 0.0   ABS IMMATURE GRANULOCYTES 0 - 0.03 10e9/L - - -     Liver Latest Ref Rng & Units 1/26/2022 10/1/2021 5/14/2021   AP 65 - 260 U/L - - -   AP (external) 40 - 150 U/L 98 96 100   TBili 0.2 - 1.3 mg/dL - - -   TBili (external) 0.2 - 1.2 mg/dL 0.5 <0.5 0.4   DBili (external) 0.0 - 0.5 mg/dL 0.2 0.3 <0.2   ALT 0 - 50 U/L - - -   ALT (external) 6 - 40 IU/L 52(H) 35 66(H)   AST 0 - 45 U/L - - -   AST (external) 10 - 40 IU/L 39 23 39   Tot Protein 6.8 - 8.8 g/dL - - -   Tot Protein (external) 6.0 - 8.0 g/dL 7.2 7.6 7.0   Albumin 3.9 - 5.1 g/dL - - -   Albumin (external) 3.5 - 5.0 g/dL 4.1 4.1 3.9        Iron studies Latest Ref Rng & Units  6/26/2020 3/3/2020 1/29/2020   Iron 35 - 180 ug/dL - - -   Iron sat 15 - 46 % - - -   Ferritin 26 - 388 ng/mL - - -   Ferritin (external) 30 - 300 ng/mL 184 270 666(H)     [facility] Txp Virology Latest Ref Rng & Units 3/12/2021 10/30/2019 8/26/2019   CMV IgG EU/mL - - -   CMV IgM <0.90 - - -   CMV IgM Interp <0.90 - - -   CVM DNA Quant - - - EDTA PLASMA   CMV DNA Quant Ext Undetected IU/mL Undetected - -   CMV Quant <100 Copies/mL - - -   CMV QT Log <2.0 Log copies/mL - - -   CMV QUANT IU/ML CMVND:CMV DNA Not Detected [IU]/mL - - CMV DNA Not Detected   LOG IU/ML OF CMVQNT <2.1 [Log:IU]/mL - - Not Calculated   LOG IU/ML OF CMVQNT (EXTERNAL) log IU/mL Undetected - -   BK Spec - - - -   BK Res <1000 copies/mL - - -   BK Log <3.0 Log copies/mL - - -   EBV IgG - - - -   EBV DNA QUANT (EXTERNAL) Undetected IU/mL Undetected - -   EBV DNA COPIES/ML EBVNEG:EBV DNA Not Detected [Copies]/mL - EBV DNA Not Detected EBV DNA Not Detected   EBV INTERP DNA QUANT (EXTERNAL) Undetected Undetected - -   EBV DNA LOG OF COPIES <2.7 [Log:copies]/mL - Not Calculated Not Calculated   EBV DNA LOG OF COPIES (EXTERNAL) Undetected log IU/mL Undetected - -   Hep B Core NEG - - -   Hep B Surf - - - -   HIV 1&2 NEG - - -       James Dumont MD

## 2022-02-01 NOTE — PATIENT INSTRUCTIONS
Recommend working on weight loss for overall health by increasing exercise and watching caloric intake.    Would follow up with PCP and be referred to local weight management clinic.

## 2022-02-07 PROBLEM — Z29.89 NEED FOR PNEUMOCYSTIS PROPHYLAXIS: Status: ACTIVE | Noted: 2022-02-07

## 2022-02-07 NOTE — PROGRESS NOTES
Hussain is a 25 year old who is being evaluated via a billable video visit.      How would you like to obtain your AVS? Mail a copy  If the video visit is dropped, the invitation should be resent by:  Will anyone else be joining your video visit? No    Video Start Time: 1309  Video-Visit Details    Type of service:  Video Visit    Video End Time:1319    Originating Location (pt. Location): Home    Distant Location (provider location):  [facility] [place]     Platform used for Video Visit: [employer]      CHRONIC TRANSPLANT NEPHROLOGY VISIT    Assessment & Plan   # LDKT: Stable   - Baseline Creatinine:  ~ 1.6-1.9   - Proteinuria: Mild (0.5-1.0 grams)   - Date DSA Last Checked: Feb/2016      Latest DSA: Not checked recently due to time from transplant   - BK Viremia: Not checked recently due to time from transplant   - Kidney Tx Biopsy: Aug 26, 2011; Result: No diagnostic evidence of acute rejection.  Unremarkable             Apr 27, 2009; Result: Borderline acute cellular-mediated rejection.    # Immunosuppression: Everolimus (goal 4-6) and Prednisone (dose 5 mg daily)          - Continue with intensive monitoring of immunosuppression for efficacy and toxicity.          - Changes: Not at this time; With significant dyslipidemia, obesity and young age, would consider immunosuppression change consideration once he is further out from his PTLD.  Changes might include decreasing or stopping mTORi or prednisone for addition of mycophenolate.    # Infection Prophylaxis:   - PJP: Sulfa/TMP (Bactrim)    # Hypertension: Controlled;  Goal BP: < 130/80   - Changes: No, will continue on metoprolol due to higher heart rate when he is off beta blocker.    # Mineral Bone Disorder:   - Vitamin D; level: Low        On supplement: No; Not on cholecalciferol due to higher serum calcium level.  - Calcium; level: Normal        On supplement: No    # Electrolytes:   - Potassium; level: Normal        On supplement:  No  - Bicarbonate; level: Normal        On supplement: No    # Diffuse Large B-cell Lymphoma (PTLD): Patient was diagnosed 11/2019 with left-sided abdominal mass and weight loss.  He was treated with R-CHOP x 6 cycles followed by XRT 4/2020.  Patient's immunosuppression was changed from cyclosporine and mycophenolate mofetil to prednisone during chemotherapy and then started on everolimus along with prednisone after he was done with treatment.  Repeat CT/PET showed persistent update in right lower quadrant, although biopsy was benign.  No evidence of recurrence on latest PET/CT 11/2021.  Patient is followed by Oncology.    # Obesity, Class II (BMI = 35.9): His weight is stable lately, but has increased significantly following recover from his cancer diagnosis.   - Recommend weight loss for overall health by increasing exercise and watching caloric intake.   - Recommend patient be referred to local Weight Management Clinic.    # Fatigue: No recent change.  Previously negative CMV and EBV PCR.    # Dyslipidemia: Improved cholesterol on statin.  Now mostly hypertriglyceridemia, likely due to mTORi.   - If PTLD remains in remission, could consider decreasing or stopping mTORi in the future if not controlled on statin.    # Abnormal LFTs: Stable, mildly elevated ALT.  Likely due to fatty liver disease with patient's obesity.   - Recommend weight loss and will follow.    # Skin Cancer Risk:    - Discussed sun protection and recommend regular follow up with Dermatology.    # Medical Compliance: Yes     # COVID-19 Virus Review: Discussed COVID-19 virus and the potential medical risks.  Reviewed preventative health recommendations, including wearing a mask where appropriate.  Recommended COVID vaccination should be up to date with either an initial vaccination or booster shot when appropriate.  Asked the patient to inform the transplant center if they are exposed or diagnosed with this virus.    # COVID Vaccination Up To  "Date: Yes    # Transplant History:  Etiology of Kidney Failure: Congenital solitary kidney  Tx: LDKT  Transplant: 2/3/2009 (Kidney)  Significant changes in immunosuppression: Changed off CNI and antimetabolite due to PTLD.  Significant transplant-related complications: Post-Transplant Lymphoproliferative Disorder (PTLD) and Acute cellular-mediated rejection    Transplant Office Phone Number: [phone]    Assessment and plan was discussed with the patient and he voiced his understanding and agreement.    Return visit: Return in about 1 year (around 2/1/2023).    James Dumont MD    Chief Complaint   Mr. Steven is a 26 year old here for kidney transplant and immunosuppression management.    History of Present Illness    Mr. Steven reports feeling okay overall with some medical complaints.  Since last clinic visit, patient reports no hospitalizations or new medical complaints and has been doing well overall.  He is now working as a  for a law firm.  His energy level remains low, but no recent change.  He is active and does get some exercise as he has a pedal machine under his desk.  Denies any chest pain or shortness of breath with exertion.  No leg swelling.    Appetite is \"fair\" as he eats generally 2 meals a day.  His weight has been stable recently, but he is up about 30 lbs in the last year to ~ 280 lbs.  No nausea, vomiting or diarrhea.  No fever, sweats or chills.    Home BP: 120/70s    Problem List   Patient Active Problem List   Diagnosis     HTN, kidney transplant related     Hydrocele, right     Kidney replaced by transplant     Aftercare following organ transplant     Immunosuppression (H)     CKD (chronic kidney disease) stage 3, GFR 30-59 ml/min (H)     Dyslipidemia     PTLD (post-transplant lymphoproliferative disorder) (H)     GERD (gastroesophageal reflux disease)     Class 1 obesity in adult     Need for pneumocystis prophylaxis       Allergies   Allergies   Allergen " Reactions     Lisinopril Other (See Comments)     headache       Medications   Current Outpatient Medications   Medication Sig     atorvastatin (LIPITOR) 20 MG tablet Take 1 tablet (20 mg) by mouth every evening     everolimus (ZORTRESS) 0.5 MG tablet Take 2 tablets (1 mg) by mouth 2 times daily Total dose is 1.75 mg twice a day.     everolimus (ZORTRESS) 0.75 MG tablet Take 1 tablet (0.75 mg) by mouth 2 times daily Total dose is 1.75 mg twice a day     famotidine (PEPCID) 20 MG tablet Take 20 mg by mouth 2 times daily     metoprolol (LOPRESSOR) 100 MG tablet Take 1 tablet (100 mg) by mouth 2 times daily     predniSONE (DELTASONE) 5 MG tablet Take 1 tablet (5 mg) by mouth daily     sertraline (ZOLOFT) 100 MG tablet Take 100 mg by mouth daily     sulfamethoxazole-trimethoprim (BACTRIM/SEPTRA) 400-80 MG tablet Take 1 tablet by mouth twice a week Twice a week, decreased for low WBC     No current facility-administered medications for this visit.     There are no discontinued medications.    Physical Exam   Vital signs were deferred for this telemedicine visit.    GENERAL APPEARANCE: alert and no distress  HENT: no obvious abnormalities on appearance  RESP: breathing appears unremarkable with normal rate, no audible wheezing or cough and no apparent shortness of breath with conversation  MS: extremities normal - no gross deformities noted  SKIN: no apparent rash and normal skin tone  NEURO: speech is clear with no obvious neurological deficits  PSYCH: mentation appears normal and affect normal    Data     Renal Latest Ref Rng & Units 1/26/2022 10/1/2021 6/25/2021   Na mmol/L - - -   Na (external) 134 - 143 mEq/L 139 138 141   K mmol/L - - -   K (external) 3.4 - 5.1 mEq/L 4.2 4.2 3.9   Cl mmol/L - - -   Cl (external) 99 - 110 mEq/L 103 106 108   CO2 mmol/L - - -   CO2 (external) 19 - 29 mEq/L 26 23 23   BUN mg/dL - - -   BUN (external) 5 - 24 mg/dL 20 20 21   Cr 0.50 - 1.00 mg/dL - - -   Cr (external) 0.70 - 1.20 mg/dL  1.59(H) 1.48(H) 1.65(H)   Glucose 60 - 99 mg/dL - - -   Glucose (external) 70 - 99 mg/dL 96 103(H) 95   Ca  mg/dL - - -   Ca (external) 8.4 - 105 mg/dL 9.9 10.1 9.8   Mg mg/dL - - -   Mg (external) 1.5 - 2.2 mEq/L - - -     Bone Health Latest Ref Rng & Units 3/12/2021 9/18/2019 8/26/2019   Phos mg/dL - - -   Phos (external) 2.5 - 46 mg/dL - 3.5 -   PTHi 18 - 80 pg/mL - - 12(L)   Vit D Def 20 - 75 ug/L - - 29   Vit D Def (external) 27 - 80 ng/mL 11(L) 51.3 -     Heme Latest Ref Rng & Units 1/26/2022 10/1/2021 6/25/2021   WBC 10:9/L - - -   WBC (external) 3.2 - 11.0 10*9/L 6.4 6.4 6.2   Hgb 13.3 - 17.7 g/dL - - -   Hgb (external) 12.9 - 16.9 g/dL 15.6 15.2 14.4   Plt 10:9/L - - -   Plt (external) 130 - 375 10*9/L 212 228 201   ABSOLUTE NEUTROPHIL - - - -   ABSOLUTE NEUTROPHILS (EXTERNAL) 1.5 - 7.6 10*9/L 4.5 4.8 4.6   ABSOLUTE LYMPHOCYTES - - - -   ABSOLUTE LYMPHOCYTES (EXTERNAL) 0.8 - 3.3 10*9/L 1.2 0.9 0.9   ABSOLUTE MONOCYTES 0.0 - 1.3 10e9/L - - -   ABSOLUTE MONOCYTES (EXTERNAL) 0.2 - 0.9 10*9/L 0.6 0.6 0.5   ABSOLUTE EOSINOPHILS 0.0 - 0.7 10e9/L - - -   ABSOLUTE EOSINOPHILS (EXTERNAL) 0.0 - 0.4 10*9/L 0.1 0.1 0.1   ABSOLUTE BASOPHILS 0.0 - 0.2 10e9/L - - -   ABSOLUTE BASOPHILS (EXTERNAL) 0.0 - 0.1 10*9/L 0.0 0.0 0.0   ABS IMMATURE GRANULOCYTES 0 - 0.03 10e9/L - - -     Liver Latest Ref Rng & Units 1/26/2022 10/1/2021 5/14/2021   AP 65 - 260 U/L - - -   AP (external) 40 - 150 U/L 98 96 100   TBili 0.2 - 1.3 mg/dL - - -   TBili (external) 0.2 - 1.2 mg/dL 0.5 <0.5 0.4   DBili (external) 0.0 - 0.5 mg/dL 0.2 0.3 <0.2   ALT 0 - 50 U/L - - -   ALT (external) 6 - 40 IU/L 52(H) 35 66(H)   AST 0 - 45 U/L - - -   AST (external) 10 - 40 IU/L 39 23 39   Tot Protein 6.8 - 8.8 g/dL - - -   Tot Protein (external) 6.0 - 8.0 g/dL 7.2 7.6 7.0   Albumin 3.9 - 5.1 g/dL - - -   Albumin (external) 3.5 - 5.0 g/dL 4.1 4.1 3.9        Iron studies Latest Ref Rng & Units 6/26/2020 3/3/2020 1/29/2020   Iron 35 - 180 ug/dL - - -   Iron sat 15  - 46 % - - -   Ferritin 26 - 388 ng/mL - - -   Ferritin (external) 30 - 300 ng/mL 184 269 666(H)     P Txp Virology Latest Ref Rng & Units 3/12/2021 10/30/2019 8/26/2019   CMV IgG EU/mL - - -   CMV IgM <0.90 - - -   CMV IgM Interp <0.90 - - -   CVM DNA Quant - - - EDTA PLASMA   CMV DNA Quant Ext Undetected IU/mL Undetected - -   CMV Quant <100 Copies/mL - - -   CMV QT Log <2.0 Log copies/mL - - -   CMV QUANT IU/ML CMVND:CMV DNA Not Detected [IU]/mL - - CMV DNA Not Detected   LOG IU/ML OF CMVQNT <2.1 [Log:IU]/mL - - Not Calculated   LOG IU/ML OF CMVQNT (EXTERNAL) log IU/mL Undetected - -   BK Spec - - - -   BK Res <1000 copies/mL - - -   BK Log <3.0 Log copies/mL - - -   EBV IgG - - - -   EBV DNA QUANT (EXTERNAL) Undetected IU/mL Undetected - -   EBV DNA COPIES/ML EBVNEG:EBV DNA Not Detected [Copies]/mL - EBV DNA Not Detected EBV DNA Not Detected   EBV INTERP DNA QUANT (EXTERNAL) Undetected Undetected - -   EBV DNA LOG OF COPIES <2.7 [Log:copies]/mL - Not Calculated Not Calculated   EBV DNA LOG OF COPIES (EXTERNAL) Undetected log IU/mL Undetected - -   Hep B Core NEG - - -   Hep B Surf - - - -   HIV 1&2 NEG - - -

## 2022-04-18 ENCOUNTER — LAB (OUTPATIENT)
Dept: LAB | Facility: CLINIC | Age: 27
End: 2022-04-18

## 2022-04-18 PROCEDURE — 80169 DRUG ASSAY EVEROLIMUS: CPT | Performed by: INTERNAL MEDICINE

## 2022-04-22 LAB
EVEROLIMUS BLD-MCNC: 4.3 UG/L (ref 3–8)
TME LAST DOSE: NORMAL H
TME LAST DOSE: NORMAL H

## 2022-05-25 DIAGNOSIS — Z94.0 KIDNEY TRANSPLANTED: Primary | ICD-10-CM

## 2022-05-25 RX ORDER — EVEROLIMUS 0.5 MG/1
1 TABLET ORAL 2 TIMES DAILY
Qty: 360 TABLET | Refills: 3 | Status: SHIPPED | OUTPATIENT
Start: 2022-05-25 | End: 2023-02-06

## 2022-05-25 RX ORDER — PREDNISONE 5 MG/1
5 TABLET ORAL DAILY
Qty: 90 TABLET | Refills: 3 | Status: SHIPPED | OUTPATIENT
Start: 2022-05-25

## 2022-05-25 RX ORDER — EVEROLIMUS 0.75 MG/1
0.75 TABLET ORAL 2 TIMES DAILY
Qty: 180 TABLET | Refills: 3 | Status: SHIPPED | OUTPATIENT
Start: 2022-05-25 | End: 2023-02-06

## 2022-08-17 ENCOUNTER — LAB (OUTPATIENT)
Dept: LAB | Facility: CLINIC | Age: 27
End: 2022-08-17
Payer: COMMERCIAL

## 2022-08-17 PROCEDURE — 80169 DRUG ASSAY EVEROLIMUS: CPT | Performed by: INTERNAL MEDICINE

## 2022-08-23 LAB
EVEROLIMUS BLD-MCNC: 6.6 UG/L (ref 3–8)
TME LAST DOSE: NORMAL H
TME LAST DOSE: NORMAL H

## 2022-11-22 ENCOUNTER — LAB (OUTPATIENT)
Dept: LAB | Facility: CLINIC | Age: 27
End: 2022-11-22

## 2022-11-22 PROCEDURE — 80169 DRUG ASSAY EVEROLIMUS: CPT

## 2022-11-28 LAB
EVEROLIMUS BLD-MCNC: 6.5 UG/L (ref 3–8)
TME LAST DOSE: NORMAL H
TME LAST DOSE: NORMAL H

## 2022-12-12 ENCOUNTER — MYC MEDICAL ADVICE (OUTPATIENT)
Dept: TRANSPLANT | Facility: CLINIC | Age: 27
End: 2022-12-12

## 2022-12-12 DIAGNOSIS — Z94.0 KIDNEY TRANSPLANTED: Primary | ICD-10-CM

## 2022-12-12 DIAGNOSIS — Z48.298 AFTERCARE FOLLOWING ORGAN TRANSPLANT: ICD-10-CM

## 2023-02-06 ENCOUNTER — VIRTUAL VISIT (OUTPATIENT)
Dept: NEPHROLOGY | Facility: CLINIC | Age: 28
End: 2023-02-06
Attending: INTERNAL MEDICINE
Payer: COMMERCIAL

## 2023-02-06 DIAGNOSIS — Z48.298 AFTERCARE FOLLOWING ORGAN TRANSPLANT: ICD-10-CM

## 2023-02-06 DIAGNOSIS — Z94.0 KIDNEY REPLACED BY TRANSPLANT: Primary | ICD-10-CM

## 2023-02-06 DIAGNOSIS — Z94.0 HTN, KIDNEY TRANSPLANT RELATED: ICD-10-CM

## 2023-02-06 DIAGNOSIS — D47.Z1 PTLD (POST-TRANSPLANT LYMPHOPROLIFERATIVE DISORDER) (H): ICD-10-CM

## 2023-02-06 DIAGNOSIS — D84.9 IMMUNOSUPPRESSION (H): ICD-10-CM

## 2023-02-06 DIAGNOSIS — I15.1 HTN, KIDNEY TRANSPLANT RELATED: ICD-10-CM

## 2023-02-06 PROBLEM — Z29.89 NEED FOR PNEUMOCYSTIS PROPHYLAXIS: Status: RESOLVED | Noted: 2022-02-07 | Resolved: 2023-02-06

## 2023-02-06 PROBLEM — N18.30 CKD (CHRONIC KIDNEY DISEASE) STAGE 3, GFR 30-59 ML/MIN (H): Status: RESOLVED | Noted: 2019-10-07 | Resolved: 2023-02-06

## 2023-02-06 PROCEDURE — G0463 HOSPITAL OUTPT CLINIC VISIT: HCPCS | Mod: PN,GT | Performed by: INTERNAL MEDICINE

## 2023-02-06 PROCEDURE — 99215 OFFICE O/P EST HI 40 MIN: CPT | Mod: 95 | Performed by: INTERNAL MEDICINE

## 2023-02-06 RX ORDER — EVEROLIMUS 0.75 MG/1
0.75 TABLET ORAL 2 TIMES DAILY
Qty: 180 TABLET | Refills: 3 | COMMUNITY
Start: 2023-02-06 | End: 2023-03-28

## 2023-02-06 RX ORDER — EVEROLIMUS 0.5 MG/1
1 TABLET ORAL 2 TIMES DAILY
Qty: 360 TABLET | Refills: 3 | COMMUNITY
Start: 2023-02-06 | End: 2023-03-28

## 2023-02-06 NOTE — PATIENT INSTRUCTIONS
Patient Recommendations:  - Make an appointemnt with dermatology for skin check  -Call your oncologist if they don't call to schedule you in the next month    Transplant Patient Information  Your Post Transplant Coordinator is: Barbara Johnson  For non urgent items, we encourage you to contact your coordinator/care team online via [facility]  You and your care team can also contact your transplant coordinator Monday - Friday, 8am - 5pm at [phone] (Option 2 to reach the coordinator or Option 4 to schedule an appointment).  After hours for urgent matters, please call [facility] at [phone].

## 2023-02-06 NOTE — LETTER
2/6/2023       RE: Hussain Steven  [address]  [place] [address]     Dear Colleague,    Thank you for referring your patient, Hussain Steven, to the [facility] [place] at [facility]. Please see a copy of my visit note below.      CHRONIC TRANSPLANT NEPHROLOGY VISIT    Assessment & Plan   # LDKT: Stable   - Baseline Creatinine:  ~ 1.4-1.7   - Proteinuria: Mild (0.5-1.0 grams)   - Date DSA Last Checked: Feb/2016      Latest DSA: Not checked recently due to time from transplant   - BK Viremia: Not checked recently due to time from transplant   - Kidney Tx Biopsy: Aug 26, 2011; Result: No diagnostic evidence of acute rejection.  Unremarkable             Apr 27, 2009; Result: Borderline acute cellular-mediated rejection.    # Immunosuppression: Everolimus (goal 4-6) and Prednisone (dose 5 mg daily)          - Continue with intensive monitoring of immunosuppression for efficacy and toxicity.          - Changes: Not at this time; With significant dyslipidemia, obesity and young age, would consider immunosuppression change consideration once he is further out from his PTLD.  Changes might include decreasing or stopping mTORi for addition of mycophenolate    # Infection Prophylaxis:   - PJP: Sulfa/TMP (Bactrim)    # Hypertension: Controlled;  Goal BP: < 130/80   - Changes: No, will continue on metoprolol 100mg bid due to higher heart rate when he is off beta blocker.    # Mineral Bone Disorder:   - Vitamin D; level: Low        On supplement: No; Not on cholecalciferol due to higher serum calcium level in the past  - Calcium; level: Normal        On supplement: No    # Electrolytes:   - Potassium; level: Normal        On supplement: No  - Bicarbonate; level: Normal        On supplement: No    # Diffuse Large B-cell Lymphoma (PTLD):    -Patient was diagnosed 11/2019 with left-sided abdominal mass and weight loss.  He was treated with R-CHOP x 6  cycles followed by XRT 4/2020.  Patient's immunosuppression was changed from cyclosporine and mycophenolate mofetil to prednisone during chemotherapy and then started on everolimus along with prednisone after he was done with treatment.  Repeat CT/PET 7/27/22 with complete metabolic response   -Continue follow up with oncology. If next PET in ~7/2023 still with complete metabolic response would consider changing sirolimus to MMF due to dyslipidemia and young age with increased risk for CAD in his lifetime     # Obesity, Class II (BMI = 36.8kg/m2): His weight is stable lately, but has increased significantly following recover from his cancer diagnosis.   - Recommend weight loss for overall health by increasing exercise and watching caloric intake.   - Recommend patient be referred to local Weight Management Clinic.    # Fatigue: No recent change.  Previously negative CMV and EBV PCR.    # Dyslipidemia: Improved cholesterol on statin.  Now mostly hypertriglyceridemia, likely due to mTORi.   - If PTLD remains in remission, could consider decreasing or stopping mTORi in the future     # Abnormal LFTs: Stable, mildly elevated ALT.  Likely due to fatty liver disease with patient's obesity.   - Recommend weight loss and will follow.    # Skin Cancer Risk:    - Discussed sun protection and recommend regular follow up with Dermatology.    # Medical Compliance: Yes     # COVID-19 Virus Review: Discussed COVID-19 virus and the potential medical risks.  Reviewed preventative health recommendations, including wearing a mask where appropriate.  Recommended COVID vaccination should be up to date with either an initial vaccination or booster shot when appropriate.  Asked the patient to inform the transplant center if they are exposed or diagnosed with this virus.    # COVID Vaccination Up To Date: Yes    # Transplant History:  Etiology of Kidney Failure: Congenital solitary kidney  Tx: LDKT  Transplant: 2/3/2009  (Kidney)  Significant changes in immunosuppression: Changed off CNI and antimetabolite due to PTLD.  Significant transplant-related complications: Post-Transplant Lymphoproliferative Disorder (PTLD) and Acute cellular-mediated rejection    Transplant Office Phone Number: [phone]    Assessment and plan was discussed with the patient and he voiced his understanding and agreement.    Return visit: Return in about 6 months (around 8/6/2023).    Sander Love MD     I spent 54 minutes on the date of the encounter doing chart review, review of outside records, review of test results, interpretation of tests, patient visit and documentation      Chief Complaint   Mr. Steven is a 27 year old here for kidney transplant and immunosuppression management.    History of Present Illness   The patient overall feels well. he denies any recent hospitalizations. he denies nausea, vomiting, diarrhea, fever, chills, shortness of breath, chest pain, LE edema, unintentional weight loss, nights sweats, dysuria, hematuria.     He last had a PET CT in 7/2022 that showed complete remission. He thinks his next scan is in 1 year.     Home BP: 120/70s    Problem List   Patient Active Problem List   Diagnosis     HTN, kidney transplant related     Hydrocele, right     Kidney replaced by transplant     Aftercare following organ transplant     Immunosuppression (H)     CKD (chronic kidney disease) stage 3, GFR 30-59 ml/min (H)     Dyslipidemia     PTLD (post-transplant lymphoproliferative disorder) (H)     GERD (gastroesophageal reflux disease)     Class 1 obesity in adult     Need for pneumocystis prophylaxis       Allergies   Allergies   Allergen Reactions     Lisinopril Other (See Comments)     headache       Medications   Current Outpatient Medications   Medication Sig     everolimus (ZORTRESS) 0.5 MG tablet Take 2 tablets (1 mg) by mouth 2 times daily Total dose 1.75mg twice daily     everolimus (ZORTRESS) 0.75 MG tablet Take 1 tablet  (0.75 mg) by mouth 2 times daily Total dose 1.75mg twice daily     atorvastatin (LIPITOR) 20 MG tablet Take 1 tablet (20 mg) by mouth every evening     famotidine (PEPCID) 20 MG tablet Take 20 mg by mouth 2 times daily     metoprolol (LOPRESSOR) 100 MG tablet Take 1 tablet (100 mg) by mouth 2 times daily     predniSONE (DELTASONE) 5 MG tablet Take 1 tablet (5 mg) by mouth daily     sulfamethoxazole-trimethoprim (BACTRIM/SEPTRA) 400-80 MG tablet Take 1 tablet by mouth twice a week Twice a week, decreased for low WBC     No current facility-administered medications for this visit.     Medications Discontinued During This Encounter   Medication Reason     everolimus (ZORTRESS) 0.75 MG tablet      everolimus (ZORTRESS) 0.5 MG tablet        Physical Exam   There were no vitals taken for this visit.      GENERAL APPEARANCE: alert and no distress  HENT: no obvious abnormalities on appearance  RESP: breathing appears unremarkable with normal rate, no audible wheezing or cough and no apparent shortness of breath with conversation  MS: extremities normal - no gross deformities noted  SKIN: no apparent rash and normal skin tone  NEURO: speech is clear with no obvious neurological deficits  PSYCH: mentation appears normal and affect normal    Data     Renal Latest Ref Rng & Units 1/30/2023 11/22/2022 8/17/2022   Na mmol/L - - -   Na (external) 134 - 143 mEq/L 140 139 138   K mmol/L - - -   K (external) 3.4 - 5.1 mEq/L 3.7 3.8 4.0   Cl 99 - 110 mEq/L 105 106 106   CO2 mmol/L - - -   CO2 (external) 19 - 29 mEq/L 25 24 24   BUN mg/dL - - -   BUN (external) 5 - 24 mg/dL 13 13 18   Cr 0.50 - 1.00 mg/dL - - -   Cr (external) 0.70 - 1.20 mg/dL 1.59(H) 1.59(H) 1.65(H)   Glucose 60 - 99 mg/dL - - -   Glucose (external) 70 - 99 mg/dL 108(H) 98 103(H)   Ca  mg/dL - - -   Ca (external) 8.4 - 10.5 mg/dL 9.5 9.7 9.8   Mg mg/dL - - -   Mg (external) 1.5 - 2.2 mEq/L - - -     Bone Health Latest Ref Rng & Units 3/12/2021 9/18/2019 8/26/2019    Phos mg/dL - - -   Phos (external) 2.5 - 46 mg/dL - 3.5 -   PTHi 18 - 80 pg/mL - - 12(L)   Vit D Def 20 - 75 ug/L - - 29   Vit D Def (external) 27 - 80 ng/mL 11(L) 51.3 -     Heme Latest Ref Rng & Units 1/30/2023 11/22/2022 8/17/2022   WBC 10:9/L - - -   WBC (external) 3.2 - 11.0 10*9/L 6.8 7.1 6.7   Hgb 13.3 - 17.7 g/dL - - -   Hgb (external) 12.9 - 16.9 g/dL 15.3 15.4 15.2   Plt 10:9/L - - -   Plt (external) 139 - 375 10*9/L 203 218 215   ABSOLUTE NEUTROPHIL - - - -   ABSOLUTE NEUTROPHILS (EXTERNAL) 1.5 - 7.6 10*9/L 4.7 4.9 5.0   ABSOLUTE LYMPHOCYTES - - - -   ABSOLUTE LYMPHOCYTES (EXTERNAL) 0.8 - 3.3 10*9/L 1.3 1.5 1.2   ABSOLUTE MONOCYTES 0.0 - 1.3 10e9/L - - -   ABSOLUTE MONOCYTES (EXTERNAL) 0.2 - 0.9 10*9/L 0.5 0.5 0.4   ABSOLUTE EOSINOPHILS 0.0 - 0.7 10e9/L - - -   ABSOLUTE EOSINOPHILS (EXTERNAL) 0.0 - 0.4 10*9/L 0.3 0.2 0.1   ABSOLUTE BASOPHILS 0.0 - 0.2 10e9/L - - -   ABSOLUTE BASOPHILS (EXTERNAL) 0.0 - 0.1 10*9/L 0.0 0.0 0.0   ABS IMMATURE GRANULOCYTES 0 - 0.03 10e9/L - - -     Liver Latest Ref Rng & Units 1/30/2023 4/18/2022 1/26/2022   AP 65 - 260 U/L - - -   AP (external) 40 - 150 mg/dL 110 103 98   TBili 0.2 - 1.3 mg/dL - - -   TBili (external) 0.2 - 1.2 mg/dL 0.6 0.5 0.5   DBili (external) 0.0 - 0.5 mg/dL 0.2 0.2 0.2   ALT 0 - 50 U/L - - -   ALT (external) 6 - 40 IU/L 50(H) 45(H) 52(H)   AST 0 - 45 U/L - - -   AST (external) 10 - 40 IU/L 28 26 39   Tot Protein 6.8 - 8.8 g/dL - - -   Tot Protein (external) 6.0 - 8.0 g/dL 7.4 7.2 7.2   Albumin 3.9 - 5.1 g/dL - - -   Albumin (external) 3.5 - 5.0 g/dL 3.8 3.8 4.1        Iron studies Latest Ref Rng & Units 6/26/2020 3/3/2020 1/29/2020   Iron 35 - 180 ug/dL - - -   Iron sat 15 - 46 % - - -   Ferritin 26 - 388 ng/mL - - -   Ferritin (external) 30 - 300 ng/mL 184 270 666(H)     [facility] Txp Virology Latest Ref Rng & Units 3/12/2021 10/30/2019 8/26/2019   CMV IgG EU/mL - - -   CMV IgM <0.90 - - -   CMV IgM Interp <0.90 - - -   CVM DNA Quant - - - EDTA PLASMA    CMV DNA Quant Ext Undetected IU/mL Undetected - -   CMV Quant <100 Copies/mL - - -   CMV QT Log <2.0 Log copies/mL - - -   CMV QUANT IU/ML CMVND:CMV DNA Not Detected [IU]/mL - - CMV DNA Not Detected   LOG IU/ML OF CMVQNT <2.1 [Log:IU]/mL - - Not Calculated   LOG IU/ML OF CMVQNT (EXTERNAL) log IU/mL Undetected - -   BK Spec - - - -   BK Res <1000 copies/mL - - -   BK Log <3.0 Log copies/mL - - -   EBV IgG - - - -   EBV DNA QUANT (EXTERNAL) Undetected IU/mL Undetected - -   EBV DNA COPIES/ML EBVNEG:EBV DNA Not Detected [Copies]/mL - EBV DNA Not Detected EBV DNA Not Detected   EBV INTERP DNA QUANT (EXTERNAL) Undetected Undetected - -   EBV DNA LOG OF COPIES <2.7 [Log:copies]/mL - Not Calculated Not Calculated   EBV DNA LOG OF COPIES (EXTERNAL) Undetected log IU/mL Undetected - -   Hep B Core NEG - - -   Hep B Surf - - - -   HIV 1&2 NEG - - -             Again, thank you for allowing me to participate in the care of your patient.      Sincerely,    Sander Love MD

## 2023-02-06 NOTE — PROGRESS NOTES
CHRONIC TRANSPLANT NEPHROLOGY VISIT    Video-Visit Details    Type of service:  Video Visit    Video Start Time (time video started): 4:44 PM    Video End Time (time video stopped): 4:53 PM    Originating Location (pt. Location): Home    Distant Location (provider location):  On-site    Mode of Communication:  Video Conference via [facility]      Assessment & Plan   # LDKT: Stable   - Baseline Creatinine:  ~ 1.4-1.7   - Proteinuria: Mild (0.5-1.0 grams)   - Date DSA Last Checked: Feb/2016      Latest DSA: Not checked recently due to time from transplant   - BK Viremia: Not checked recently due to time from transplant   - Kidney Tx Biopsy: Aug 26, 2011; Result: No diagnostic evidence of acute rejection.  Unremarkable             Apr 27, 2009; Result: Borderline acute cellular-mediated rejection.    # Immunosuppression: Everolimus (goal 4-6) and Prednisone (dose 5 mg daily)          - Continue with intensive monitoring of immunosuppression for efficacy and toxicity.          - Changes: Not at this time; With significant dyslipidemia, obesity and young age, would consider immunosuppression change consideration once he is further out from his PTLD.  Changes might include decreasing or stopping mTORi for addition of mycophenolate    # Infection Prophylaxis:   - PJP: Sulfa/TMP (Bactrim)    # Hypertension: Controlled;  Goal BP: < 130/80   - Changes: No, will continue on metoprolol 100mg bid due to higher heart rate when he is off beta blocker.    # Mineral Bone Disorder:   - Vitamin D; level: Low        On supplement: No; Not on cholecalciferol due to higher serum calcium level in the past  - Calcium; level: Normal        On supplement: No    # Electrolytes:   - Potassium; level: Normal        On supplement: No  - Bicarbonate; level: Normal        On supplement: No    # Diffuse Large B-cell Lymphoma (PTLD):    -Patient was diagnosed 11/2019 with left-sided abdominal mass and weight loss.  He was treated with R-CHOP x 6  cycles followed by XRT 4/2020.  Patient's immunosuppression was changed from cyclosporine and mycophenolate mofetil to prednisone during chemotherapy and then started on everolimus along with prednisone after he was done with treatment.  Repeat CT/PET 7/27/22 with complete metabolic response   -Continue follow up with oncology. If next PET in ~7/2023 still with complete metabolic response would consider changing sirolimus to MMF due to dyslipidemia and young age with increased risk for CAD in his lifetime     # Obesity, Class II (BMI = 36.8kg/m2): His weight is stable lately, but has increased significantly following recover from his cancer diagnosis.   - Recommend weight loss for overall health by increasing exercise and watching caloric intake.   - Recommend patient be referred to local Weight Management Clinic.    # Fatigue: No recent change.  Previously negative CMV and EBV PCR.    # Dyslipidemia: Improved cholesterol on statin.  Now mostly hypertriglyceridemia, likely due to mTORi.   - If PTLD remains in remission, could consider decreasing or stopping mTORi in the future     # Abnormal LFTs: Stable, mildly elevated ALT.  Likely due to fatty liver disease with patient's obesity.   - Recommend weight loss and will follow.    # Skin Cancer Risk:    - Discussed sun protection and recommend regular follow up with Dermatology.    # Medical Compliance: Yes     # COVID-19 Virus Review: Discussed COVID-19 virus and the potential medical risks.  Reviewed preventative health recommendations, including wearing a mask where appropriate.  Recommended COVID vaccination should be up to date with either an initial vaccination or booster shot when appropriate.  Asked the patient to inform the transplant center if they are exposed or diagnosed with this virus.    # COVID Vaccination Up To Date: Yes    # Transplant History:  Etiology of Kidney Failure: Congenital solitary kidney  Tx: LDKT  Transplant: 2/3/2009  (Kidney)  Significant changes in immunosuppression: Changed off CNI and antimetabolite due to PTLD.  Significant transplant-related complications: Post-Transplant Lymphoproliferative Disorder (PTLD) and Acute cellular-mediated rejection    Transplant Office Phone Number: [phone]    Assessment and plan was discussed with the patient and he voiced his understanding and agreement.    Return visit: Return in about 6 months (around 8/6/2023).    Sander oLve MD     I spent 54 minutes on the date of the encounter doing chart review, review of outside records, review of test results, interpretation of tests, patient visit and documentation      Chief Complaint   Mr. Steven is a 27 year old here for kidney transplant and immunosuppression management.    History of Present Illness   The patient overall feels well. he denies any recent hospitalizations. he denies nausea, vomiting, diarrhea, fever, chills, shortness of breath, chest pain, LE edema, unintentional weight loss, nights sweats, dysuria, hematuria.     He last had a PET CT in 7/2022 that showed complete remission. He thinks his next scan is in 1 year.     Home BP: 120/70s    Problem List   Patient Active Problem List   Diagnosis     HTN, kidney transplant related     Hydrocele, right     Kidney replaced by transplant     Aftercare following organ transplant     Immunosuppression (H)     CKD (chronic kidney disease) stage 3, GFR 30-59 ml/min (H)     Dyslipidemia     PTLD (post-transplant lymphoproliferative disorder) (H)     GERD (gastroesophageal reflux disease)     Class 1 obesity in adult     Need for pneumocystis prophylaxis       Allergies   Allergies   Allergen Reactions     Lisinopril Other (See Comments)     headache       Medications   Current Outpatient Medications   Medication Sig     everolimus (ZORTRESS) 0.5 MG tablet Take 2 tablets (1 mg) by mouth 2 times daily Total dose 1.75mg twice daily     everolimus (ZORTRESS) 0.75 MG tablet Take 1 tablet  (0.75 mg) by mouth 2 times daily Total dose 1.75mg twice daily     atorvastatin (LIPITOR) 20 MG tablet Take 1 tablet (20 mg) by mouth every evening     famotidine (PEPCID) 20 MG tablet Take 20 mg by mouth 2 times daily     metoprolol (LOPRESSOR) 100 MG tablet Take 1 tablet (100 mg) by mouth 2 times daily     predniSONE (DELTASONE) 5 MG tablet Take 1 tablet (5 mg) by mouth daily     sulfamethoxazole-trimethoprim (BACTRIM/SEPTRA) 400-80 MG tablet Take 1 tablet by mouth twice a week Twice a week, decreased for low WBC     No current facility-administered medications for this visit.     Medications Discontinued During This Encounter   Medication Reason     everolimus (ZORTRESS) 0.75 MG tablet      everolimus (ZORTRESS) 0.5 MG tablet        Physical Exam   There were no vitals taken for this visit.      GENERAL APPEARANCE: alert and no distress  HENT: no obvious abnormalities on appearance  RESP: breathing appears unremarkable with normal rate, no audible wheezing or cough and no apparent shortness of breath with conversation  MS: extremities normal - no gross deformities noted  SKIN: no apparent rash and normal skin tone  NEURO: speech is clear with no obvious neurological deficits  PSYCH: mentation appears normal and affect normal    Data     Renal Latest Ref Rng & Units 1/30/2023 11/22/2022 8/17/2022   Na mmol/L - - -   Na (external) 134 - 143 mEq/L 140 139 138   K mmol/L - - -   K (external) 3.4 - 5.1 mEq/L 3.7 3.8 4.0   Cl 99 - 110 mEq/L 105 106 106   CO2 mmol/L - - -   CO2 (external) 19 - 29 mEq/L 25 24 24   BUN mg/dL - - -   BUN (external) 5 - 24 mg/dL 13 13 18   Cr 0.50 - 1.00 mg/dL - - -   Cr (external) 0.70 - 1.20 mg/dL 1.59(H) 1.59(H) 1.65(H)   Glucose 60 - 99 mg/dL - - -   Glucose (external) 70 - 99 mg/dL 108(H) 98 103(H)   Ca  mg/dL - - -   Ca (external) 8.4 - 10.5 mg/dL 9.5 9.7 9.8   Mg mg/dL - - -   Mg (external) 1.5 - 2.2 mEq/L - - -     Bone Health Latest Ref Rng & Units 3/12/2021 9/18/2019 8/26/2019    Phos mg/dL - - -   Phos (external) 2.5 - 46 mg/dL - 3.5 -   PTHi 18 - 80 pg/mL - - 12(L)   Vit D Def 20 - 75 ug/L - - 29   Vit D Def (external) 27 - 80 ng/mL 11(L) 51.3 -     Heme Latest Ref Rng & Units 1/30/2023 11/22/2022 8/17/2022   WBC 10:9/L - - -   WBC (external) 3.2 - 11.0 10*9/L 6.8 7.1 6.7   Hgb 13.3 - 17.7 g/dL - - -   Hgb (external) 12.9 - 16.9 g/dL 15.3 15.4 15.2   Plt 10:9/L - - -   Plt (external) 139 - 375 10*9/L 203 218 215   ABSOLUTE NEUTROPHIL - - - -   ABSOLUTE NEUTROPHILS (EXTERNAL) 1.5 - 7.6 10*9/L 4.7 4.9 5.0   ABSOLUTE LYMPHOCYTES - - - -   ABSOLUTE LYMPHOCYTES (EXTERNAL) 0.8 - 3.3 10*9/L 1.3 1.5 1.2   ABSOLUTE MONOCYTES 0.0 - 1.3 10e9/L - - -   ABSOLUTE MONOCYTES (EXTERNAL) 0.2 - 0.9 10*9/L 0.5 0.5 0.4   ABSOLUTE EOSINOPHILS 0.0 - 0.7 10e9/L - - -   ABSOLUTE EOSINOPHILS (EXTERNAL) 0.0 - 0.4 10*9/L 0.3 0.2 0.1   ABSOLUTE BASOPHILS 0.0 - 0.2 10e9/L - - -   ABSOLUTE BASOPHILS (EXTERNAL) 0.0 - 0.1 10*9/L 0.0 0.0 0.0   ABS IMMATURE GRANULOCYTES 0 - 0.03 10e9/L - - -     Liver Latest Ref Rng & Units 1/30/2023 4/18/2022 1/26/2022   AP 65 - 260 U/L - - -   AP (external) 40 - 150 mg/dL 110 103 98   TBili 0.2 - 1.3 mg/dL - - -   TBili (external) 0.2 - 1.2 mg/dL 0.6 0.5 0.5   DBili (external) 0.0 - 0.5 mg/dL 0.2 0.2 0.2   ALT 0 - 50 U/L - - -   ALT (external) 6 - 40 IU/L 50(H) 45(H) 52(H)   AST 0 - 45 U/L - - -   AST (external) 10 - 40 IU/L 28 26 39   Tot Protein 6.8 - 8.8 g/dL - - -   Tot Protein (external) 6.0 - 8.0 g/dL 7.4 7.2 7.2   Albumin 3.9 - 5.1 g/dL - - -   Albumin (external) 3.5 - 5.0 g/dL 3.8 3.8 4.1        Iron studies Latest Ref Rng & Units 6/26/2020 3/3/2020 1/29/2020   Iron 35 - 180 ug/dL - - -   Iron sat 15 - 46 % - - -   Ferritin 26 - 388 ng/mL - - -   Ferritin (external) 30 - 300 ng/mL 184 270 666(H)     [facility] Txp Virology Latest Ref Rng & Units 3/12/2021 10/30/2019 8/26/2019   CMV IgG EU/mL - - -   CMV IgM <0.90 - - -   CMV IgM Interp <0.90 - - -   CVM DNA Quant - - - EDTA PLASMA    CMV DNA Quant Ext Undetected IU/mL Undetected - -   CMV Quant <100 Copies/mL - - -   CMV QT Log <2.0 Log copies/mL - - -   CMV QUANT IU/ML CMVND:CMV DNA Not Detected [IU]/mL - - CMV DNA Not Detected   LOG IU/ML OF CMVQNT <2.1 [Log:IU]/mL - - Not Calculated   LOG IU/ML OF CMVQNT (EXTERNAL) log IU/mL Undetected - -   BK Spec - - - -   BK Res <1000 copies/mL - - -   BK Log <3.0 Log copies/mL - - -   EBV IgG - - - -   EBV DNA QUANT (EXTERNAL) Undetected IU/mL Undetected - -   EBV DNA COPIES/ML EBVNEG:EBV DNA Not Detected [Copies]/mL - EBV DNA Not Detected EBV DNA Not Detected   EBV INTERP DNA QUANT (EXTERNAL) Undetected Undetected - -   EBV DNA LOG OF COPIES <2.7 [Log:copies]/mL - Not Calculated Not Calculated   EBV DNA LOG OF COPIES (EXTERNAL) Undetected log IU/mL Undetected - -   Hep B Core NEG - - -   Hep B Surf - - - -   HIV 1&2 NEG - - -

## 2023-03-28 DIAGNOSIS — Z94.0 KIDNEY REPLACED BY TRANSPLANT: Primary | ICD-10-CM

## 2023-03-28 RX ORDER — EVEROLIMUS 0.75 MG/1
0.75 TABLET ORAL 2 TIMES DAILY
Qty: 180 TABLET | Refills: 3 | Status: SHIPPED | OUTPATIENT
Start: 2023-03-28 | End: 2023-10-04

## 2023-03-28 RX ORDER — EVEROLIMUS 0.5 MG/1
1 TABLET ORAL 2 TIMES DAILY
Qty: 360 TABLET | Refills: 3 | Status: SHIPPED | OUTPATIENT
Start: 2023-03-28 | End: 2023-10-04

## 2023-04-23 ENCOUNTER — HEALTH MAINTENANCE LETTER (OUTPATIENT)
Age: 28
End: 2023-04-23

## 2023-05-10 ENCOUNTER — MYC MEDICAL ADVICE (OUTPATIENT)
Dept: NEPHROLOGY | Facility: CLINIC | Age: 28
End: 2023-05-10
Payer: COMMERCIAL

## 2023-05-22 ENCOUNTER — LAB (OUTPATIENT)
Dept: LAB | Facility: CLINIC | Age: 28
End: 2023-05-22

## 2023-05-22 DIAGNOSIS — Z48.298 AFTERCARE FOLLOWING ORGAN TRANSPLANT: Primary | ICD-10-CM

## 2023-05-22 PROCEDURE — 80169 DRUG ASSAY EVEROLIMUS: CPT | Performed by: INTERNAL MEDICINE

## 2023-05-26 LAB
EVEROLIMUS BLD-MCNC: 6.7 UG/L (ref 3–8)
TME LAST DOSE: NORMAL H
TME LAST DOSE: NORMAL H

## 2023-10-04 DIAGNOSIS — Z94.0 KIDNEY REPLACED BY TRANSPLANT: Primary | ICD-10-CM

## 2023-10-04 RX ORDER — EVEROLIMUS 0.75 MG/1
0.75 TABLET ORAL 2 TIMES DAILY
Qty: 180 TABLET | Refills: 3 | Status: SHIPPED | OUTPATIENT
Start: 2023-10-04 | End: 2023-10-26

## 2023-10-04 RX ORDER — EVEROLIMUS 0.5 MG/1
1 TABLET ORAL 2 TIMES DAILY
Qty: 360 TABLET | Refills: 3 | Status: SHIPPED | OUTPATIENT
Start: 2023-10-04 | End: 2023-10-26

## 2023-10-05 ENCOUNTER — TELEPHONE (OUTPATIENT)
Dept: TRANSPLANT | Facility: CLINIC | Age: 28
End: 2023-10-05
Payer: COMMERCIAL

## 2023-10-05 NOTE — TELEPHONE ENCOUNTER
Please call [facility] pharmacy [phone] [phone] re: Everolimus 0.75Mg Medication coverage

## 2023-10-12 ENCOUNTER — TELEPHONE (OUTPATIENT)
Dept: TRANSPLANT | Facility: CLINIC | Age: 28
End: 2023-10-12
Payer: COMMERCIAL

## 2023-10-12 ENCOUNTER — TELEPHONE (OUTPATIENT)
Dept: ONCOLOGY | Facility: CLINIC | Age: 28
End: 2023-10-12
Payer: COMMERCIAL

## 2023-10-12 DIAGNOSIS — Z94.0 KIDNEY REPLACED BY TRANSPLANT: Primary | ICD-10-CM

## 2023-10-12 RX ORDER — EVEROLIMUS 0.75 MG/1
0.75 TABLET ORAL 2 TIMES DAILY
Qty: 60 TABLET | Refills: 0 | Status: SHIPPED | OUTPATIENT
Start: 2023-10-12

## 2023-10-12 RX ORDER — EVEROLIMUS 1 MG/1
1 TABLET ORAL 2 TIMES DAILY
Qty: 60 TABLET | Refills: 0 | Status: SHIPPED | OUTPATIENT
Start: 2023-10-12

## 2023-10-12 RX ORDER — EVEROLIMUS 0.75 MG/1
0.75 TABLET ORAL 2 TIMES DAILY
Qty: 60 TABLET | Refills: 0 | OUTPATIENT
Start: 2023-10-12 | End: 2023-10-12

## 2023-10-12 RX ORDER — EVEROLIMUS 1 MG/1
1 TABLET ORAL 2 TIMES DAILY
Qty: 60 TABLET | Refills: 0 | OUTPATIENT
Start: 2023-10-12 | End: 2023-10-12

## 2023-10-12 NOTE — TELEPHONE ENCOUNTER
PA Initiation    Medication: EVEROLIMUS 0.75 MG PO TABS  Insurance Company: [employer] - Phone [phone] Fax [phone]  [facility]  Filling Pharmacy Phone:  on file  Filling Pharmacy Fax:  on file  Start Date: 10/12/2023    10/12/23 -- waiting on clinical questions

## 2023-10-12 NOTE — TELEPHONE ENCOUNTER
PA Initiation    Medication: EVEROLIMUS 0.5 MG PO TABS  Insurance Company: [employer] - Phone [phone] Fax [phone]  Pharmacy Filling the Rx:  [facility]  Filling Pharmacy Phone:  on file  Filling Pharmacy Fax:  on file  Start Date: 10/12/2023  Waiting on clinical questions      10/12/23 completed clinical questions and sent over office visit.

## 2023-10-17 NOTE — TELEPHONE ENCOUNTER
Prior Authorization Approval    Medication: EVEROLIMUS 0.75 MG PO TABS  Authorization Effective Date: 10/16/2023  Authorization Expiration Date: 10/15/2024  Approved Dose/Quantity: UD  Reference #: [id number]   Insurance Company: [employer] - Phone [phone] Fax [phone]  Expected CoPay: $    CoPay Card Available: No    Financial Assistance Needed: no  Which Pharmacy is filling the prescription: [facility]  Pharmacy Notified: yes  Patient Notified: yes  Medication is only covered under medical unable to obtain a cost for the medication will all depend on where he is at with his deductible and oop  Approval # [id number]      Patient will need to call [employer] to provide his major medical benefit information - called patient and left message

## 2023-10-19 NOTE — TELEPHONE ENCOUNTER
Prior Authorization Approval    Medication: EVEROLIMUS 0.5 MG PO TABS  Authorization Effective Date: 10/18/2023  Authorization Expiration Date: 10/17/2024  Approved Dose/Quantity: UD  Reference #: [id number]   Insurance Company: [employer] - Phone [phone] Fax [phone]  Expected CoPay: unknown covered under the medical benefit (depends on where patient is at in hos deductible cannot get a cost)  CoPay Card Available: No    Financial Assistance Needed: no  Which Pharmacy is filling the prescription:    Pharmacy Notified: yes  Patient Notified: yes  Patients medication has been approved however it was approved under medical.  [employer] Ann[employer] can run through under the medication benefit however patient will need to call and provide that information to them  Patient is aware and will reach out. He is also aware to reach out to me as well if he has any issues

## 2023-10-20 ENCOUNTER — LAB (OUTPATIENT)
Dept: LAB | Facility: CLINIC | Age: 28
End: 2023-10-20
Payer: COMMERCIAL

## 2023-10-20 DIAGNOSIS — Z48.298 AFTERCARE FOLLOWING ORGAN TRANSPLANT: Primary | ICD-10-CM

## 2023-10-20 PROCEDURE — 80169 DRUG ASSAY EVEROLIMUS: CPT | Performed by: INTERNAL MEDICINE

## 2023-10-27 LAB
EVEROLIMUS BLD-MCNC: 9.9 UG/L (ref 3–8)
TME LAST DOSE: ABNORMAL H
TME LAST DOSE: ABNORMAL H

## 2023-10-30 DIAGNOSIS — Z94.0 KIDNEY REPLACED BY TRANSPLANT: ICD-10-CM

## 2023-10-30 RX ORDER — EVEROLIMUS 0.75 MG/1
1.5 TABLET ORAL 2 TIMES DAILY
Qty: 120 TABLET | Refills: 11 | Status: SHIPPED | OUTPATIENT
Start: 2023-10-30

## 2023-12-08 ENCOUNTER — LAB (OUTPATIENT)
Dept: LAB | Facility: CLINIC | Age: 28
End: 2023-12-08

## 2023-12-08 DIAGNOSIS — Z48.298 AFTERCARE FOLLOWING ORGAN TRANSPLANT: Primary | ICD-10-CM

## 2023-12-08 PROCEDURE — 80169 DRUG ASSAY EVEROLIMUS: CPT | Performed by: INTERNAL MEDICINE

## 2023-12-15 LAB
EVEROLIMUS BLD-MCNC: 6.4 UG/L (ref 3–8)
TME LAST DOSE: NORMAL H
TME LAST DOSE: NORMAL H

## 2024-02-27 ENCOUNTER — TELEPHONE (OUTPATIENT)
Dept: TRANSPLANT | Facility: CLINIC | Age: 29
End: 2024-02-27
Payer: COMMERCIAL

## 2024-02-27 NOTE — TELEPHONE ENCOUNTER
[facility] called, `Jane', [facility] has been trying to get a hold of patient but unable to connect;  `they are trying to reach patient  to let him know Everolimus has a very high Co - pay.  [facility] is checking to see if patient may want to try Zortress.    Everolimus 0.75mg  / Zortress.     [facility] is hopping we can connect with patient.

## 2024-03-14 ENCOUNTER — LAB (OUTPATIENT)
Dept: LAB | Facility: CLINIC | Age: 29
End: 2024-03-14
Payer: COMMERCIAL

## 2024-03-14 PROCEDURE — 80169 DRUG ASSAY EVEROLIMUS: CPT | Performed by: INTERNAL MEDICINE

## 2024-03-22 LAB
EVEROLIMUS BLD-MCNC: 6 UG/L (ref 3–8)
TME LAST DOSE: NORMAL H
TME LAST DOSE: NORMAL H

## 2024-03-26 ENCOUNTER — TELEPHONE (OUTPATIENT)
Dept: TRANSPLANT | Facility: CLINIC | Age: 29
End: 2024-03-26
Payer: COMMERCIAL

## 2024-03-27 NOTE — TELEPHONE ENCOUNTER
PA Initiation    Medication: EVEROLIMUS 0.75 MG PO TABS  Insurance Company: [employer] - Phone [phone] Fax [phone]  [place] [facility] [place]  Filling Pharmacy Phone:    Filling Pharmacy Fax:    Start Date: 3/27/2024

## 2024-03-29 NOTE — TELEPHONE ENCOUNTER
Prior Authorization Not Needed per Insurance    Medication: EVEROLIMUS 0.75 MG PO TABS  Insurance Company: [employer] - Phone [phone] Fax [phone]  Pharm[facility] F[facility] the Rx[facility]  Pharmacy Notified: YES  Patient Notified: NO  No pa is needed medication paid through with a delivery date of April 2nd

## 2024-06-29 ENCOUNTER — HEALTH MAINTENANCE LETTER (OUTPATIENT)
Age: 29
End: 2024-06-29

## 2024-09-23 ENCOUNTER — TELEPHONE (OUTPATIENT)
Dept: TRANSPLANT | Facility: CLINIC | Age: 29
End: 2024-09-23
Payer: COMMERCIAL

## 2024-09-23 NOTE — TELEPHONE ENCOUNTER
Issue:  Overdue for transplant labs.  Overdue for annual SOT appointment.  Has not read My Chart reminder.    Plan:  Call Hussain:  Request a full set of transplant labs, including a good drug level, be drawn in the next 1-2 weeks.    Advise him to call [phone] option #4 to schedule his annual SOT appointment.       LPN Task:  Please call with above plan.  Thanks!

## 2024-09-25 ENCOUNTER — TELEPHONE (OUTPATIENT)
Dept: ONCOLOGY | Facility: CLINIC | Age: 29
End: 2024-09-25
Payer: COMMERCIAL

## 2024-09-25 NOTE — TELEPHONE ENCOUNTER
PA Initiation    Medication: EVEROLIMUS 0.5 MG PO TABS  Insurance Company:  [employer] Filling the Rx:    Filling Pharmacy Phone:    Filling Pharmacy Fax:    Start Date: 9/25/2024

## 2024-10-09 ENCOUNTER — TELEPHONE (OUTPATIENT)
Dept: TRANSPLANT | Facility: CLINIC | Age: 29
End: 2024-10-09
Payer: COMMERCIAL

## 2024-10-09 NOTE — TELEPHONE ENCOUNTER
General  Route to LPN    Reason for call: Prior auth is needed for everolimus .75mg     Call back needed? Yes    Return Call Needed  Same as documented in contacts section  When to return call?: Greater than one day: Route standard priority

## 2024-10-09 NOTE — TELEPHONE ENCOUNTER
Prior Authorization Specialty Medication Request    Medication/Dose: Prior auth is needed for Everolimus .75mg   Diagnosis and ICD code (if different than what is on RX):  Z94.0   New/renewal/insurance change PA/secondary ins. PA:  Previously Tried and Failed:      Important Lab Values:   Rationale:     Insurance   Primary:   Insurance ID:      Secondary (if applicable):  Insurance ID:      Pharmacy Information (if different than what is on RX)  [facility]  Phone:    Fax:    Clinic Information  Preferred routing pool for dept communication:

## 2024-10-15 NOTE — TELEPHONE ENCOUNTER
Rep didn't get the PA however I sent this again and confirmed fax number. Rep is going to send a message to check on status if they have the pa we should hear back within 72 hours

## 2024-10-17 ENCOUNTER — TELEPHONE (OUTPATIENT)
Dept: TRANSPLANT | Facility: CLINIC | Age: 29
End: 2024-10-17
Payer: COMMERCIAL

## 2024-10-17 NOTE — TELEPHONE ENCOUNTER
Prior Authorization Approval    Medication: EVEROLIMUS 0.75 MG PO TABS  Authorization Effective Date: 10/8/2024  Authorization Expiration Date: 4/5/2025  Approved Dose/Quantity: UD  Reference #: [id number]    [employer] Card Available:      Financial Assistance Needed: N/A  Which Pharmacy is filling the prescription:    Pharmacy Notified: YES  Patient Notified: NO

## 2024-10-17 NOTE — TELEPHONE ENCOUNTER
"ISSUE: PA for everolimus may require an updated clinic note.  Multiple attempts have been made over the past year.  Last contact with SOT team noted as 10/12/2023.  Last SOT clinic apt: 2/1/2022, last lab on file March 2024.    Left message for the patient with request for call back to discuss.  Spoke with mother, ophelia, who states he is living in [facility] and is \" working a lot\"  she will reach out to the patient to request he call SOT clinic or respond to my chart message send 10/17.2024.    Sahra Bashir RN   Transplant Coordinator  [phone]    "

## 2024-10-18 NOTE — TELEPHONE ENCOUNTER
RNCC placed call to Hussain to inform that [facility] is awaiting to connect with him to schedule delivery of everolimus.     Patient states he has not spoken with them yet but confirms he has the phone number to call them for shipment.     Juliet Barth RN, BSN, CCTN  Solid Organ Transplant, Post Kidney and Pancreas  Transplant Care Coordinator  [phone]   ____________________________________________________________________   RE: Everolimus  Received: Today  Eulalia Sarah, Juliet Farias RN; Sahra Bashir RN; Carly Romano RN; Maura Johnson, RN  Caller: Unspecified (1 week ago)  When I spoke with someone from [facility] they stated they had an rx ready and waiting to hear back from patient to call to setup delivery.          Previous Messages       ----- Message -----  From: Juliet Barth RN  Sent: 10/18/2024  11:22 AM CDT  To: Carly Romano RN; Maura Johnson, RN; *  Subject: Everolimus                                      Hi- (One more person in the mix today as I am covering for Barbara)-    Please clarify, as it looks like we need to send everolimus 0.75 mg tablets, dose 1.5 mg (2 tabs) PO BID prescription to pharmacy yet? Last sent Oct 30, 2023...    [facility] that was on previous prescription?    Juliet Barth RN, BSN, CCTN  Solid Organ Transplant, Post Kidney and Pancreas  Transplant Care Coordinator  [phone]  ----- Message -----  From: Eulalia Sarah  Sent: 10/18/2024   8:45 AM CDT  To: Carly Romano RN; Maura Johnson, RN; *    This is approved through 4/5/2025

## 2024-10-24 ENCOUNTER — LAB (OUTPATIENT)
Dept: LAB | Facility: CLINIC | Age: 29
End: 2024-10-24
Payer: COMMERCIAL

## 2024-10-24 PROCEDURE — 80169 DRUG ASSAY EVEROLIMUS: CPT | Performed by: INTERNAL MEDICINE

## 2024-11-01 LAB
EVEROLIMUS BLD-MCNC: 5.9 UG/L (ref 3–8)
TME LAST DOSE: NORMAL H
TME LAST DOSE: NORMAL H

## 2024-11-01 NOTE — TELEPHONE ENCOUNTER
Added a one time free voucher while working on PA for everolimus      Daily Note     Today's date: 2024  Patient name: Miguel Angel Ramos  : 1950  MRN: [id number]  Referring provider: Sundeep Stone MD  Dx:   Encounter Diagnosis     ICD-10-CM    1. Chronic midline low back pain without sciatica  M54.50     G89.29           Start Time: 0730  Stop Time: 0815  Total time in clinic (min): 45 minutes    Subjective: Patient reports feeling great today after his injection on Wednesday.       Objective: See treatment diary below      Assessment: Tolerated treatment well. No noted discomfort since the injection. Continues to feel challenged with current exercise program. Patient demonstrated fatigue post treatment, exhibited good technique with therapeutic exercises, and would benefit from continued PT      Plan: Continue per plan of care.  Progress treatment as tolerated.      I have directly supervised and participated in the care of this patient with the therapy student, Saundra Santos, DOT. I agree with the details as outlined above as well as during today's session. Court Sousa, PT, DPT        Precautions: fall risk      FOTO   = 56/61   = 53/61  10= 53/61    POC exp =  1/3/25    Access Code: [email or web address]  URL: [email or web address]  Date: 2024  Prepared by: Court Sousa    Exercises  - Supine Transversus Abdominis Bracing - Hands on Stomach  - 5 x daily - 7 x weekly - 3 sets - 10 reps  - Hooklying Clamshell with Resistance  - 1 x daily - 7 x weekly - 3 sets - 10 reps - 5 hold  - Seated Transversus Abdominis Bracing with PLB  - 5 x daily - 7 x weekly - 3 sets - 10 reps    Manuals 10/16 10/18 10/25 10/29 11/1 9/27 10/2 10/4 10/9 10/11             Re SW                                          Neuro Re-Ed             TA iso             TA with hooklying clamshell YHB 2x10 YHB 2x10 YHB 2x10 NP  YHB 2x10  YHB 2x10     S/l clamshells             S/l hip abd w/ TA draw             Pallof press OJB 2x10 OJB 2x10 OJB 2x8 OJB 2x8 OJB 2x10 OJB 2x10 OJB  "2x10 OJB 2x10 OJB 2x10 OJB 2x10   Standing TA with pball press down     5\" x15        Bridges      2x10       Rectangle  BAPS x25 each way x25 each way x25 each way x25 each way X15 each way   x25 each way x25 each way x25 each way    Supine pelvic tilt                          SandDune march      Step up and hip flex BLE  X15 each side Step up and hip flex BLE  X15 each side Step up and hip flex BLE  X15 each side     Ther Ex             Vg for endur and strength L7 10' L7 10' L7 10' L7 10' L7 15' L7 10' L7 10' L7 10' L7 10' L7 10'   Resisted side step             LTR             Standing hip ext. On airex              Pball roll out             Standing ext at table             Pball DKTC       Supine 2x10       Leg ext.  35#   3x8 35#  3x8 40# 3x8 40# 3x10 40# 3x10 20# 2x8   30# 2x8 30#   3x8   Standing ham curls    4# AW 3x10 4# AW 3x10        Ther Activity             Rotan carry             Suitcase carry 15# KB with YJB [id number] 15# with YTB [id number] 15# with YTB [id number] 15# with YTB [id number] 15# KB with YJB [id number] 15# KB with YJB [id number] 15# KB with YJB 2x304ue 15# KB with YJB [id number] 15# KB with YJB [id number]    Box lift              Box carry             Seated deadlift             STS 3x6, standing on airex 3x6, standing on airex 5# KB 3x6, standing on airex 5# KB 3x6, standing on airex 5# KB 3x6, standing on airex 10# DB, 3x7 3x6, standing on airex 3x6, standing on airex 3x6, standing on airex 3x6, standing on airex, 5x STS   Lunges Sanddune +airex 3x5 each Sanddune +airex 3x5 each Sanddune +airex 3x5 each Sanddune +airex 3x5 each nv  Sanddune +airex 3x5 each Sanddune +airex 3x5 each Sanddune +airex 3x5 each Sanddune +airex 3x5 each   Bike for endurance              HEP and lifting technique education          POC/HEP SW   Gait Training                                       Modalities                                                                                "

## 2024-11-08 DIAGNOSIS — D47.Z1 PTLD (POST-TRANSPLANT LYMPHOPROLIFERATIVE DISORDER) (H): ICD-10-CM

## 2024-11-08 DIAGNOSIS — Z94.0 KIDNEY REPLACED BY TRANSPLANT: ICD-10-CM

## 2024-11-08 RX ORDER — EVEROLIMUS 0.75 MG/1
1.5 TABLET ORAL 2 TIMES DAILY
Qty: 120 TABLET | Refills: 11 | Status: SHIPPED | OUTPATIENT
Start: 2024-11-08

## 2024-11-08 NOTE — TELEPHONE ENCOUNTER
Medication Refill  Route to [facility]    Pharmacy Name: [facility]   Pharmacy Location: [address]  Name of Medication: Everolimus   Quantity / Dose: 0.75 mg Tab Qty 120

## 2024-12-09 NOTE — PROGRESS NOTES
Virtual Visit Details    Type of service:  Video Visit     Originating Location (pt. Location): Home    Distant Location (provider location):  On-site  Platform used for Video Visit: [facility]    TRANSPLANT NEPHROLOGY CLINIC VISIT   It was a pleasure meeting Mr. Steven via video today.  He was unaccompanied.  He is now more than 15 years from his kidney transplant.  He has no side effects from everolimus or prednisone.  More than 4 years have elapsed since completing treatment for his PTLD.  Images in 2020 and 2023 showed continued decreased metabolic activity.      If everything continues to be stable, next year we should discuss whether we should switch him to mycophenolate. I don't think it's warranted at this time as his cholesterol and proteinuria are okay and he has no other side effects from immunosuppression.     Recommendation:  Continue Everolimus and Prednisone.   Blood work every 3 months.   Check T cell subset with next blood work, if CD4 above 200, then stop Bactrim.    Assessment & Plan   # LDKT: CKD Stage 2 - Stable   - Baseline Creatinine: ~ 1.4-1.7   - Proteinuria: Minimal (0.2-0.5 grams)   - DSA Hx: No DSA   - Last cPRA: 44%   - BK Viremia: Not checked post transplant   - Kidney Tx Biopsy Hx: 2011: No history of acute rejection.    # Immunosuppression: Everolimus (goal 4-6) and Prednisone (dose 5 mg daily)   - Induction with Recent Transplant:  Not known due to time from transplant   - Continue with intensive monitoring of immunosuppression for efficacy and toxicity.   - Historical Changes in Immunosuppression:  Changed off CNI and antimetabolite due to PTLD.   - Changes: Not at this time    # Infection Prevention:      - PJP: Sulfa/TMP (Bactrim)      - CMV IgG Ab High Risk Discordance (D+/R-): Unknown  CMV Serostatus: Negative  - EBV IgG Ab High Risk Discordance (D+/R-): No  EBV Serostatus: Positive    # Hypertension: Controlled;  Goal BP: < 130/80   - Metoprolol 100 mg twice a day.   - Changes:  Not at this time    # Mineral Bone Disorder:    - Secondary renal hyperparathyroidism; PTH level:  Low.         On treatment: None  - Vitamin D; level: Low        On supplement: No  - Calcium; level: Normal        On supplement: No    # Electrolytes:   - Potassium; level: Normal        On supplement: No  - Bicarbonate; level: Normal        On supplement: No    # Other Significant PMH:  - Diffuse Large B-cell Lymphoma (PTLD): Patient was diagnosed 11/2019 with left-sided abdominal mass and weight loss. He was treated with R-CHOP x 6 cycles followed by XRT 4/2020.  Patient's immunosuppression was changed from cyclosporine and mycophenolate mofetil to prednisone during chemotherapy and then started on everolimus along with prednisone after he was done with treatment.      PET/CT in October 2023 with continued decrease in metabolic activity. No evidence of recurrence. Last saw oncology in February, 2024. Next scan, as per patient, one year from last one.      - Obesity, Class II (BMI = 36.8kg/m2): Recommend weight loss for overall health by increasing exercise and watching caloric intake .     - Fatigue: No recent change. Previously negative CMV and EBV PCR.     - Dyslipidemia: On Atorvastatin 20 mg every day. Last LDL in June, 2023 less than 70 mg/dL despite the use of an mTOR.     - Abnormal LFTs: Stable, mildly elevated ALT.  Likely due to fatty liver disease with patient's obesity. Recommend weight loss and will follow.     # Skin Cancer Risk: Discussed sun protection and recommend regular follow up with Dermatology.    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    Return visit: No follow-ups on file.    Wicho Fuentes MD    The longitudinal plan of care for the diagnosis(es)/condition(s) as documented were addressed during this visit. Due to the added complexity in care, I will continue to support Hussain in the subsequent management and with ongoing continuity of care.      Chief Complaint   Mr. Steven is a 29 year old here for kidney transplant and immunosuppression management.     History of Present Illness   Mr. Steven reports feeling good overall.    Since last clinic visit:   Hospitalizations: No   New Medical Issues: No    Chest pain or shortness of breath: No  Lower extremity swelling: No  Weight change: No  Nausea and vomiting: No  Diarrhea: No  Heartburn symptoms: No  Fever, sweats or chills: No  Urinary complaints: No    Home BP:  120s/70-80s.    Problem List   Patient Active Problem List   Diagnosis    HTN, kidney transplant related    Kidney replaced by transplant    Aftercare following organ transplant    Immunosuppression (H)    Dyslipidemia    PTLD (post-transplant lymphoproliferative disorder) (H)    GERD (gastroesophageal reflux disease)    Class 1 obesity in adult       Allergies   Allergies   Allergen Reactions    Lisinopril Other (See Comments)     headache       Medications   Current Outpatient Medications   Medication Sig Dispense Refill    atorvastatin (LIPITOR) 20 MG tablet Take 1 tablet (20 mg) by mouth every evening 30 tablet 11    everolimus (ZORTRESS) 0.5 MG tablet HOLD for dose changes. Total dose 1.5 mg twice daily      everolimus (ZORTRESS) 0.75 MG tablet Take 2 tablets (1.5 mg) by mouth 2 times daily. Total dose 1.5 mg twice daily 120 tablet 11    famotidine (PEPCID) 20 MG tablet Take 20 mg by mouth 2 times daily      metoprolol (LOPRESSOR) 100 MG tablet Take 1 tablet (100 mg) by mouth 2 times daily 60 tablet 1    predniSONE (DELTASONE) 5 MG tablet Take 1 tablet (5 mg) by mouth daily 90 tablet 3     sulfamethoxazole-trimethoprim (BACTRIM/SEPTRA) 400-80 MG tablet Take 1 tablet by mouth twice a week Twice a week, decreased for low WBC 10 tablet 11    ZORTRESS 0.75 MG tablet Take 1 tablet (0.75 mg) by mouth 2 times daily Total dose 1.75 mg twice daily. 60 tablet 0    ZORTRESS 1 MG tablet Take 1 tablet (1 mg) by mouth 2 times daily Total dose 1.75 mg twice daily. 60 tablet 0     No current facility-administered medications for this visit.     There are no discontinued medications.    Physical Exam   Vital Signs: There were no vitals taken for this visit.    GENERAL: alert and no distress  EYES: Eyes grossly normal to inspection.  No discharge or erythema, or obvious scleral/conjunctival abnormalities.  RESP: No audible wheeze, cough, or visible cyanosis.    SKIN: Visible skin clear. No significant rash, abnormal pigmentation or lesions.  NEURO: Cranial nerves grossly intact.  Mentation and speech appropriate for age.  PSYCH: Appropriate affect, tone, and pace of words      Data         Latest Ref Rng & Units 10/24/2024    10:29 AM 3/14/2024    10:47 AM 12/8/2023    11:09 AM   Renal   Na (external) 134 - 143 mEq/L 139     138     140       K (external) 3.4 - 5.1 mEq/L 4.2     4.1     4.0       Cl 99 - 110 mEq/L 108     108     108       Cl (external) 99 - 110 mEq/L 108     108     108       CO2 (external) 19 - 29 mEq/L 22     24     23       BUN (external) 5 - 24 mg/dL 16     16     12       Cr (external) 0.70 - 1.20 mg/dL 1.46     1.39     1.48       Glucose (external) 70 - 99 mg/dL 101     104     104       Ca (external) 8.4 - 10.5 mg/dL 10.0     9.6     9.7           This result is from an external source.         Latest Ref Rng & Units 3/12/2021     8:57 AM 9/18/2019     9:44 AM 8/26/2019     2:11 PM   Bone Health   Phos (external) 2.5 - 46 mg/dL  3.5        Parathyroid Hormone Intact 18 - 80 pg/mL   12    Vit D Def 20 - 75 ug/L   29    Vit D Def (external) 27 - 80 ng/mL 11     51.3            This result is  from an external source.         Latest Ref Rng & Units 10/24/2024    10:29 AM 3/14/2024    10:47 AM 12/8/2023    11:09 AM   Heme   WBC (external) 3.2 - 11.0 10*9/L 6.6     6.1     6.5       Hgb (external) 12.9 - 16.9 g/dL 15.7     14.9     14.5       Plt (external) 130 - 375 10*9/L 314     257     264       ABSOLUTE NEUTROPHILS (EXTERNAL) 1.5 - 7.6 10*9/L 4.3     4.1     4.4       ABSOLUTE LYMPHOCYTES (EXTERNAL) 0.8 - 3.3 10*9/L 1.5     1.4     1.4       ABSOLUTE MONOCYTES (EXTERNAL) 0.2 - 0.9 10*9/L 0.5     0.4     0.6       ABSOLUTE EOSINOPHILS (EXTERNAL) 0.0 - 0.4 10*9/L 0.3     0.2     0.2       ABSOLUTE BASOPHILS (EXTERNAL) 0.0 - 0.1 10*9/L 0.0     0.0     0.0           This result is from an external source.         Latest Ref Rng & Units 10/24/2024    10:29 AM 1/30/2023     8:33 AM 4/18/2022    10:20 AM   Liver   AP (external) 40 - 150 IU/L 133     110     103       TBili (external) 0.2 - 1.2 mg/dL 0.5     0.6     0.5       DBili (external) 0.0 - 0.5 mg/dL  0.2     0.2       ALT (external) 6 - 40 IU/L 55     50     45       AST (external) 10 - 40 IU/L 38     28     26       Tot Protein (external) 6.0 - 8.0 g/dL 7.8     7.4     7.2       Albumin (external) 3.5 - 5.0 g/dL 3.8     3.8     3.8           This result is from an external source.            Latest Ref Rng & Units 6/26/2020     9:22 AM 3/3/2020     8:03 AM 1/29/2020    10:05 AM   Iron studies   Ferritin (external) 30 - 300 ng/mL 184     270     666           This result is from an external source.         Latest Ref Rng & Units 3/12/2021     8:57 AM 10/30/2019     2:19 PM 8/26/2019     2:11 PM   [facility] Virology   CVM DNA Quant    EDTA PLASMA    CMV DNA Quant Ext Undetected IU/mL Undetected         CMV QUANT IU/ML CMVND^CMV DNA Not Detected [IU]/mL   CMV DNA Not Detected    LOG IU/ML OF CMVQNT <2.1 [Log_IU]/mL   Not Calculated    LOG IU/ML OF CMVQNT (EXTERNAL) log IU/mL Undetected         EBV DNA QUANT (EXTERNAL) Undetected IU/mL Undetected          EBV DNA COPIES/ML EBVNEG^EBV DNA Not Detected [Copies]/mL  EBV DNA Not Detected  EBV DNA Not Detected    EBV INTERP DNA QUANT (EXTERNAL) Undetected Undetected         EBV DNA LOG OF COPIES <2.7 [Log_copies]/mL  Not Calculated  Not Calculated    EBV DNA LOG OF COPIES (EXTERNAL) Undetected log IU/mL Undetected             This result is from an external source.     Failed to redirect to the Timeline version of the [employer] SmartLink.

## 2024-12-10 ENCOUNTER — VIRTUAL VISIT (OUTPATIENT)
Dept: TRANSPLANT | Facility: CLINIC | Age: 29
End: 2024-12-10
Attending: INTERNAL MEDICINE
Payer: COMMERCIAL

## 2024-12-10 VITALS — BODY MASS INDEX: 34.01 KG/M2 | HEIGHT: 74 IN | WEIGHT: 265 LBS

## 2024-12-10 DIAGNOSIS — I15.1 HTN, KIDNEY TRANSPLANT RELATED: Primary | ICD-10-CM

## 2024-12-10 DIAGNOSIS — Z94.0 HTN, KIDNEY TRANSPLANT RELATED: Primary | ICD-10-CM

## 2024-12-10 DIAGNOSIS — Z94.0 KIDNEY REPLACED BY TRANSPLANT: ICD-10-CM

## 2024-12-10 DIAGNOSIS — D84.9 IMMUNOSUPPRESSION (H): ICD-10-CM

## 2024-12-10 DIAGNOSIS — Z48.298 AFTERCARE FOLLOWING ORGAN TRANSPLANT: ICD-10-CM

## 2024-12-10 ASSESSMENT — PAIN SCALES - GENERAL: PAINLEVEL_OUTOF10: NO PAIN (0)

## 2024-12-10 NOTE — LETTER
12/10/2024      Hussain Steven  [address] [facility] [address]      Dear Colleague,    Thank you for referring your patient, Hussain Steven, to the [facility] TRANSPLANT CLINIC. Please see a copy of my visit note below.    Virtual Visit Details    Type of service:  Video Visit     Originating Location (pt. Location): Home    Distant Location (provider location):  On-site  Platform used for Video Visit: [facility]    TRANSPLANT NEPHROLOGY CLINIC VISIT   It was a pleasure meeting Mr. Steven via video today.  He was unaccompanied.  He is now more than 15 years from his kidney transplant.  He has no side effects from everolimus or prednisone.  More than 4 years have elapsed since completing treatment for his PTLD.  Images in 2020 and 2023 showed continued decreased metabolic activity.      If everything continues to be stable, next year we should discuss whether we should switch him to mycophenolate. I don't think it's warranted at this time as his cholesterol and proteinuria are okay and he has no other side effects from immunosuppression.     Recommendation:  Continue Everolimus and Prednisone.   Blood work every 3 months.   Check T cell subset with next blood work, if CD4 above 200, then stop Bactrim.    Assessment & Plan  # LDKT: CKD Stage 2 - Stable   - Baseline Creatinine: ~ 1.4-1.7   - Proteinuria: Minimal (0.2-0.5 grams)   - DSA Hx: No DSA   - Last cPRA: 44%   - BK Viremia: Not checked post transplant   - Kidney Tx Biopsy Hx: 2011: No history of acute rejection.    # Immunosuppression: Everolimus (goal 4-6) and Prednisone (dose 5 mg daily)   - Induction with Recent Transplant:  Not known due to time from transplant   - Continue with intensive monitoring of immunosuppression for efficacy and toxicity.   - Historical Changes in Immunosuppression:  Changed off CNI and antimetabolite due to PTLD.   - Changes: Not at this time    # Infection Prevention:      - PJP: Sulfa/TMP (Bactrim)      - CMV IgG Ab  High Risk Discordance (D+/R-): Unknown  CMV Serostatus: Negative  - EBV IgG Ab High Risk Discordance (D+/R-): No  EBV Serostatus: Positive    # Hypertension: Controlled;  Goal BP: < 130/80   - Metoprolol 100 mg twice a day.   - Changes: Not at this time    # Mineral Bone Disorder:    - Secondary renal hyperparathyroidism; PTH level:  Low.         On treatment: None  - Vitamin D; level: Low        On supplement: No  - Calcium; level: Normal        On supplement: No    # Electrolytes:   - Potassium; level: Normal        On supplement: No  - Bicarbonate; level: Normal        On supplement: No    # Other Significant PMH:  - Diffuse Large B-cell Lymphoma (PTLD): Patient was diagnosed 11/2019 with left-sided abdominal mass and weight loss. He was treated with R-CHOP x 6 cycles followed by XRT 4/2020.  Patient's immunosuppression was changed from cyclosporine and mycophenolate mofetil to prednisone during chemotherapy and then started on everolimus along with prednisone after he was done with treatment.      PET/CT in October 2023 with continued decrease in metabolic activity. No evidence of recurrence. Last saw oncology in February, 2024. Next scan, as per patient, one year from last one.      - Obesity, Class II (BMI = 36.8kg/m2): Recommend weight loss for overall health by increasing exercise and watching caloric intake .     - Fatigue: No recent change. Previously negative CMV and EBV PCR.     - Dyslipidemia: On Atorvastatin 20 mg every day. Last LDL in June, 2023 less than 70 mg/dL despite the use of an mTOR.     - Abnormal LFTs: Stable, mildly elevated ALT.  Likely due to fatty liver disease with patient's obesity. Recommend weight loss and will follow.     # Skin Cancer Risk: Discussed sun protection and recommend regular follow up with Dermatology.    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    Return visit: No follow-ups on file.    Wicho Fuentes MD    The longitudinal plan of care for the diagnosis(es)/condition(s) as documented were addressed during this visit. Due to the added complexity in care, I will continue to support Hussain in the subsequent management and with ongoing continuity of care.      Chief Complaint  Mr. Steven is a 29 year old here for kidney transplant and immunosuppression management.     History of Present Illness  Mr. Steven reports feeling good overall.    Since last clinic visit:   Hospitalizations: No   New Medical Issues: No    Chest pain or shortness of breath: No  Lower extremity swelling: No  Weight change: No  Nausea and vomiting: No  Diarrhea: No  Heartburn symptoms: No  Fever, sweats or chills: No  Urinary complaints: No    Home BP:  120s/70-80s.    Problem List  Patient Active Problem List   Diagnosis     HTN, kidney transplant related     Kidney replaced by transplant     Aftercare following organ transplant     Immunosuppression (H)     Dyslipidemia     PTLD (post-transplant lymphoproliferative disorder) (H)     GERD (gastroesophageal reflux disease)     Class 1 obesity in adult       Allergies  Allergies   Allergen Reactions     Lisinopril Other (See Comments)     headache       Medications  Current Outpatient Medications   Medication Sig Dispense Refill     atorvastatin (LIPITOR) 20 MG tablet Take 1 tablet (20 mg) by mouth every evening 30 tablet 11     everolimus (ZORTRESS) 0.5 MG tablet HOLD for dose changes. Total dose 1.5 mg twice daily       everolimus (ZORTRESS) 0.75 MG tablet Take 2 tablets (1.5 mg) by mouth 2 times daily. Total dose 1.5 mg twice daily 120 tablet 11     famotidine (PEPCID) 20 MG tablet Take 20  mg by mouth 2 times daily       metoprolol (LOPRESSOR) 100 MG tablet Take 1 tablet (100 mg) by mouth 2 times daily 60 tablet 1     predniSONE (DELTASONE) 5 MG tablet Take 1 tablet (5 mg) by mouth daily 90 tablet 3     sulfamethoxazole-trimethoprim (BACTRIM/SEPTRA) 400-80 MG tablet Take 1 tablet by mouth twice a week Twice a week, decreased for low WBC 10 tablet 11     ZORTRESS 0.75 MG tablet Take 1 tablet (0.75 mg) by mouth 2 times daily Total dose 1.75 mg twice daily. 60 tablet 0     ZORTRESS 1 MG tablet Take 1 tablet (1 mg) by mouth 2 times daily Total dose 1.75 mg twice daily. 60 tablet 0     No current facility-administered medications for this visit.     There are no discontinued medications.    Physical Exam  Vital Signs: There were no vitals taken for this visit.    GENERAL: alert and no distress  EYES: Eyes grossly normal to inspection.  No discharge or erythema, or obvious scleral/conjunctival abnormalities.  RESP: No audible wheeze, cough, or visible cyanosis.    SKIN: Visible skin clear. No significant rash, abnormal pigmentation or lesions.  NEURO: Cranial nerves grossly intact.  Mentation and speech appropriate for age.  PSYCH: Appropriate affect, tone, and pace of words      Data        Latest Ref Rng & Units 10/24/2024    10:29 AM 3/14/2024    10:47 AM 12/8/2023    11:09 AM   Renal   Na (external) 134 - 143 mEq/L 139     138     140       K (external) 3.4 - 5.1 mEq/L 4.2     4.1     4.0       Cl 99 - 110 mEq/L 108     108     108       Cl (external) 99 - 110 mEq/L 108     108     108       CO2 (external) 19 - 29 mEq/L 22     24     23       BUN (external) 5 - 24 mg/dL 16     16     12       Cr (external) 0.70 - 1.20 mg/dL 1.46     1.39     1.48       Glucose (external) 70 - 99 mg/dL 101     104     104       Ca (external) 8.4 - 10.5 mg/dL 10.0     9.6     9.7           This result is from an external source.         Latest Ref Rng & Units 3/12/2021     8:57 AM 9/18/2019     9:44 AM 8/26/2019     2:11  PM   Bone Health   Phos (external) 2.5 - 46 mg/dL  3.5        Parathyroid Hormone Intact 18 - 80 pg/mL   12    Vit D Def 20 - 75 ug/L   29    Vit D Def (external) 27 - 80 ng/mL 11     51.3            This result is from an external source.         Latest Ref Rng & Units 10/24/2024    10:29 AM 3/14/2024    10:47 AM 12/8/2023    11:09 AM   Heme   WBC (external) 3.2 - 11.0 10*9/L 6.6     6.1     6.5       Hgb (external) 12.9 - 16.9 g/dL 15.7     14.9     14.5       Plt (external) 130 - 375 10*9/L 314     257     264       ABSOLUTE NEUTROPHILS (EXTERNAL) 1.5 - 7.6 10*9/L 4.3     4.1     4.4       ABSOLUTE LYMPHOCYTES (EXTERNAL) 0.8 - 3.3 10*9/L 1.5     1.4     1.4       ABSOLUTE MONOCYTES (EXTERNAL) 0.2 - 0.9 10*9/L 0.5     0.4     0.6       ABSOLUTE EOSINOPHILS (EXTERNAL) 0.0 - 0.4 10*9/L 0.3     0.2     0.2       ABSOLUTE BASOPHILS (EXTERNAL) 0.0 - 0.1 10*9/L 0.0     0.0     0.0           This result is from an external source.         Latest Ref Rng & Units 10/24/2024    10:29 AM 1/30/2023     8:33 AM 4/18/2022    10:20 AM   Liver   AP (external) 40 - 150 IU/L 133     110     103       TBili (external) 0.2 - 1.2 mg/dL 0.5     0.6     0.5       DBili (external) 0.0 - 0.5 mg/dL  0.2     0.2       ALT (external) 6 - 40 IU/L 55     50     45       AST (external) 10 - 40 IU/L 38     28     26       Tot Protein (external) 6.0 - 8.0 g/dL 7.8     7.4     7.2       Albumin (external) 3.5 - 5.0 g/dL 3.8     3.8     3.8           This result is from an external source.            Latest Ref Rng & Units 6/26/2020     9:22 AM 3/3/2020     8:03 AM 1/29/2020    10:05 AM   Iron studies   Ferritin (external) 30 - 300 ng/mL 184     676 466           This result is from an external source.         Latest Ref Rng & Units 3/12/2021     8:57 AM 10/30/2019     2:19 PM 8/26/2019     2:11 PM   [facility]   CVM DNA Quant    EDTA PLASMA    CMV DNA Quant Ext Undetected IU/mL Undetected         CMV QUANT IU/ML CMVND^CMV DNA Not  Detected [IU]/mL   CMV DNA Not Detected    LOG IU/ML OF CMVQNT <2.1 [Log_IU]/mL   Not Calculated    LOG IU/ML OF CMVQNT (EXTERNAL) log IU/mL Undetected         EBV DNA QUANT (EXTERNAL) Undetected IU/mL Undetected         EBV DNA COPIES/ML EBVNEG^EBV DNA Not Detected [Copies]/mL  EBV DNA Not Detected  EBV DNA Not Detected    EBV INTERP DNA QUANT (EXTERNAL) Undetected Undetected         EBV DNA LOG OF COPIES <2.7 [Log_copies]/mL  Not Calculated  Not Calculated    EBV DNA LOG OF COPIES (EXTERNAL) Undetected log IU/mL Undetected             This result is from an external source.     Failed to redirect to the Timeline version of the REVFS SmartLink.            Again, thank you for allowing me to participate in the care of your patient.        Sincerely,        Wicho Fuentes MD

## 2024-12-10 NOTE — PATIENT INSTRUCTIONS
Patient Recommendations:  - Continue blood work once every 3 months.  - We'll add an immune cell test to see if we can stop Bactrim.     Transplant Patient Information  Your Post Transplant Coordinator is: Barbara Johnson  For non urgent items, we encourage you to contact your coordinator/care team online via [employer]  You and your care team can also contact your transplant coordinator Monday - Friday, 8am - 5pm at [phone] (Option 2 to reach the coordinator or Option 4 to schedule an appointment).  After hours for urgent matters, please call [facility] at [phone].

## 2024-12-10 NOTE — NURSING NOTE
Current patient location: [address] [place] [address]    Is the patient currently in the state of [place]? YES    Visit mode:VIDEO    If the visit is dropped, the patient can be reconnected by:VIDEO VISIT: Text to cell phone:   Telephone Information:   Mobile [phone]       Will anyone else be joining the visit? NO  (If patient encounters technical issues they should call [phone]    Are changes needed to the allergy or medication list? No    Are refills needed on medications prescribed by this physician? NO    Rooming Documentation:  Questionnaire(s) completed    Reason for visit: RECHECK (F/U)    Cecy RENEF

## 2025-01-30 NOTE — TELEPHONE ENCOUNTER
PA Initiation    Medication: EVEROLIMUS 0.75 MG PO TABS  Insurance Company:  [employer]  Pharmacy Reference# [id number]  Filling Pharmacy Phone:    Filling Pharmacy Fax:  [phone]  Start Date: 10/10/2024     Manually faxed (per [employer] rep) required form and attached documents supporting prior authorization. Rep states it can take 7-10 days for a response. Marked URGENT   30-Jan-2025 31-Jan-2025

## 2025-02-06 ENCOUNTER — TELEPHONE (OUTPATIENT)
Dept: TRANSPLANT | Facility: CLINIC | Age: 30
End: 2025-02-06
Payer: COMMERCIAL

## 2025-02-06 DIAGNOSIS — D47.Z1 PTLD (POST-TRANSPLANT LYMPHOPROLIFERATIVE DISORDER) (H): ICD-10-CM

## 2025-02-06 NOTE — TELEPHONE ENCOUNTER
PA Initiation    Medication: EVEROLIMUS 0.75 MG PO TABS  Insurance Company: Other (see comments)  Pharmacy Filling the Rx:    Filling Pharmacy Phone:    Filling Pharmacy Fax:    Start Date: 2/6/2025  PA submitted under the medical side due to pharmacy not covered (excluded ) from the patients plan   Can Call [phone] to check on status of PA  Case # [id number]  Faxed clinicals to [phone]

## 2025-02-07 NOTE — TELEPHONE ENCOUNTER
Rep stated that this medication should go through prime. Informed rep that I was told the medication is excluded from pharmacy benefit. She is going to look into this and call me back some time today

## 2025-02-10 ENCOUNTER — TELEPHONE (OUTPATIENT)
Dept: TRANSPLANT | Facility: CLINIC | Age: 30
End: 2025-02-10
Payer: COMMERCIAL

## 2025-02-10 DIAGNOSIS — D84.9 IMMUNOSUPPRESSION: ICD-10-CM

## 2025-02-10 DIAGNOSIS — Z48.298 AFTERCARE FOLLOWING ORGAN TRANSPLANT: ICD-10-CM

## 2025-02-10 DIAGNOSIS — Z94.0 KIDNEY REPLACED BY TRANSPLANT: Primary | ICD-10-CM

## 2025-02-10 RX ORDER — EVEROLIMUS 0.5 MG/1
0.5 TABLET ORAL 2 TIMES DAILY
Qty: 60 TABLET | Refills: 0 | Status: SHIPPED | OUTPATIENT
Start: 2025-02-10 | End: 2025-02-11

## 2025-02-10 RX ORDER — EVEROLIMUS 1 MG/1
1 TABLET ORAL 2 TIMES DAILY
Qty: 60 TABLET | Refills: 0 | Status: SHIPPED | OUTPATIENT
Start: 2025-02-10 | End: 2025-02-11 | Stop reason: ALTCHOICE

## 2025-02-10 NOTE — TELEPHONE ENCOUNTER
Still no updated at this time. Still pending at this time, the supervisor is in sometime today and should reach out to me later today on if this can be covered medically

## 2025-02-11 RX ORDER — EVEROLIMUS 0.5 MG/1
1.5 TABLET ORAL 2 TIMES DAILY
Qty: 180 TABLET | Refills: 0 | Status: SHIPPED | OUTPATIENT
Start: 2025-02-11 | End: 2025-02-12

## 2025-02-11 NOTE — TELEPHONE ENCOUNTER
RNCC received notification from Eulalia Sarah, pharmacy advocate, that patient had used Zortress Voucher for 1 mg tablets in 2023 and because this is a one time use per lifetime, the 1 mg prescription was declining. Therefore, prescription resent for Zortress 0.5 mg tablets, total dose 1.5 mg (3 tabs) PO BID for quantity 180 tabs (30 days) sent to [facility]. Reportedly however, the may only allow 120 tablets (20 days).     Juliet Barth RN, BSN, CCTN  Solid Organ Transplant, Post Kidney and Pancreas  Transplant Care Coordinator  [phone]

## 2025-02-12 DIAGNOSIS — D47.Z1 PTLD (POST-TRANSPLANT LYMPHOPROLIFERATIVE DISORDER) (H): ICD-10-CM

## 2025-02-12 DIAGNOSIS — Z48.298 AFTERCARE FOLLOWING ORGAN TRANSPLANT: ICD-10-CM

## 2025-02-12 DIAGNOSIS — Z94.0 KIDNEY REPLACED BY TRANSPLANT: Primary | ICD-10-CM

## 2025-02-12 RX ORDER — EVEROLIMUS 0.5 MG/1
1.5 TABLET ORAL 2 TIMES DAILY
Qty: 180 TABLET | Refills: 0 | Status: SHIPPED | OUTPATIENT
Start: 2025-02-12

## 2025-02-12 NOTE — TELEPHONE ENCOUNTER
Still no updated at this time. I spoke with Frederick RADFORD and she was not able to get an answer back. She said she will reach out to me later on this letting me know what she hears back

## 2025-02-13 DIAGNOSIS — D47.Z1 PTLD (POST-TRANSPLANT LYMPHOPROLIFERATIVE DISORDER) (H): ICD-10-CM

## 2025-02-19 NOTE — TELEPHONE ENCOUNTER
Everolimus is currently under the appeal process which can take up to 5- 7 business days typically but she was unsure how long exactly it could take .  She is going to get this expedited sine he has a couple weeks of medication in the meantime

## 2025-02-20 DIAGNOSIS — D47.Z1 PTLD (POST-TRANSPLANT LYMPHOPROLIFERATIVE DISORDER) (H): ICD-10-CM

## 2025-02-27 NOTE — TELEPHONE ENCOUNTER
Per Desiree -Rx zev which can mail to the patient   Everolimus $318.00 for Qty of 60/30  Zortress $705

## 2025-02-28 NOTE — TELEPHONE ENCOUNTER
Left message for patient to return call to txp office regarding current immunosuppression medications.  Office phone number given.

## 2025-03-04 ENCOUNTER — TELEPHONE (OUTPATIENT)
Dept: TRANSPLANT | Facility: CLINIC | Age: 30
End: 2025-03-04
Payer: COMMERCIAL

## 2025-03-04 DIAGNOSIS — D47.Z1 PTLD (POST-TRANSPLANT LYMPHOPROLIFERATIVE DISORDER) (H): ICD-10-CM

## 2025-03-04 NOTE — TELEPHONE ENCOUNTER
ISSUE: Insurance coverage for Everolimus.  PLAN: If insurance denies approval for Everolimus, switch to Envarsus. See provider and primary RNCC communication. Pharmacy advocate assisting with coverage review/appeal process and application for Envarsus.    Maura Johnson RN Brugman, Whitney M; TyrelEulalia A18 hours ago (4:43 PM)     Hi,  See Dr. Sandhu's note below. First choice would be to get the Everolimus approved/covered, but if that is not an option, then can we apply for Envarsus 1.5 mg daily through patient assistance?  Thanks!     Wicho Clark MD Harris, Kathleen, RN19 hours ago (4:23 PM)     Yes, that works.     Maura Johnson RN Funes Hernandez, Mario, MD19 hours ago (4:19 PM)     We would not be able to get free Tac IR. We could potentially get Envarsus through patient assistance. Would you be OK with starting Envarsus 1.5 mg daily goal 4-6, closer to 4?     Wicho Clark MD Harris, Kathleen, RN19 hours ago (4:13 PM)     Rosa Lorenzo, as you mentioned, we should try to get everolimus approved, but if not I would switch him to tacrolimus with a goal of 4-6, closer to 4. We can overlap the change, starting with tacrolimus 1 mg twice a day, until we have Tac close to 4.     OUTCOME:  This writer received email with Envarsus prescription to review. Recommended total daily dose of Envarsus 1.5 mg (0.75 tablet x 2 tablets daily), dispense 90 day supply, qty 180 tablets with 3 refills for the year. Accurate on form. May submit for physician review and signature.     Juliet Barth, RN, BSN, CCTN  Solid Organ Transplant, Post Kidney and Pancreas  Transplant Care Coordinator  [phone]

## 2025-03-04 NOTE — TELEPHONE ENCOUNTER
PRIOR AUTHORIZATION DENIED    Medication: ENVARSUS XR 0.75 MG PO TB24  Insurance Company: [employer] - Phone [phone] Fax [phone]  Denial Date: 3/4/2025  Denial Reason(s):           Appeal Information:         Patient Notified: no

## 2025-03-11 ENCOUNTER — TELEPHONE (OUTPATIENT)
Dept: TRANSPLANT | Facility: CLINIC | Age: 30
End: 2025-03-11

## 2025-03-11 DIAGNOSIS — D47.Z1 PTLD (POST-TRANSPLANT LYMPHOPROLIFERATIVE DISORDER) (H): ICD-10-CM

## 2025-03-11 NOTE — TELEPHONE ENCOUNTER
FREE DRUG APPLICATION INITIATED    Medication: ENVARSUS XR 0.75 MG PO TB24  Free Drug Program Name:  Other (see comments)  Date Submitted: 3/11/2025 10:11 AM  Phone #: [phone]  Fax #: [phone]  Additional Information:   Will need to send in an rx separately  Still unable to get a hold of patient

## 2025-03-12 ENCOUNTER — TELEPHONE (OUTPATIENT)
Dept: TRANSPLANT | Facility: CLINIC | Age: 30
End: 2025-03-12
Payer: COMMERCIAL

## 2025-03-12 DIAGNOSIS — D47.Z1 PTLD (POST-TRANSPLANT LYMPHOPROLIFERATIVE DISORDER) (H): ICD-10-CM

## 2025-03-12 NOTE — TELEPHONE ENCOUNTER
ISSUE  Patient no longer has prescription coverage and cannot afford Everolimus.      Hi Hussain Laird is a kidney transplant from 2009. His current IS is Everolimus and Pred 5.   He has history of PTLD in 2019. Prior to that he was on  mg BID and Cyclosporine .   Hussain is having insurance issues, He does not have any prescription coverage at this time.   We could potentially get him free Cellcept and/or Gengraf through a patient assistance program if you were OK with him changing immunosuppression.   No history of rejection, no DSA. It looks like he had significant BK right after transplant, I assume that's why he was on Cyclosporine and not Tac. BK has been negative since '09.   How would you feel about changing him over to Cellcept and/or Gengraf?   We are waiting to hear back on a last ditch effort to get Everolimus covered, but if we don't we would need to make the change soon as he will be out of meds in a week or so.   Thanks!       We would not be able to get free Tac IR. We could potentially get Envarsus through patient assistance. Would you be OK with starting Envarsus 1.5 mg daily goal 4-6, closer to 4?    Wicho Clark MD to Me       3/3/25  4:23 PM  Yes, that works.       Patient has not responded to pharmacy liaison phone call to fill out his portion on the patient assitance paperwork.      OUTCOME  Call placed to Hussain. Detailed VM left requesting a call back to discuss Envarsus. Emphasized the importance of gettting his patient assistance paperwork filled out and discussing the change from Everolimus to Envarsus.  My chart message sent.

## 2025-03-13 NOTE — TELEPHONE ENCOUNTER
Called patient and left message to call me back so we can get him enrolled with free drug on envarsus

## 2025-03-20 ENCOUNTER — TELEPHONE (OUTPATIENT)
Dept: TRANSPLANT | Facility: CLINIC | Age: 30
End: 2025-03-20
Payer: COMMERCIAL

## 2025-03-20 DIAGNOSIS — D47.Z1 PTLD (POST-TRANSPLANT LYMPHOPROLIFERATIVE DISORDER) (H): ICD-10-CM

## 2025-03-20 NOTE — LETTER
Hussain Steven  [address] [facility] [address]                March 20, 2025          Dear Hussain Steven,     We hope this letter finds you well. We at the Solid Organ Transplant Office have been trying to reach you via phone, however we have been unsuccessful. Please contact us at your earliest convenience at [phone] x [phone]. We would like to discuss immunosuppression changes due to lack of insurance coverage for prescriptions. Thank you.          Sincerely,     Your Transplant Team

## 2025-03-20 NOTE — TELEPHONE ENCOUNTER
ISSUE  Patient insurance no longer covering immunosuppression. Attempting to enroll patient in patient assistance program for Envarsus.  Patient has not responded to multiple phone calls or my chart messages to discuss immunosuppression changes.        OUTCOME  Call placed to Hussain. No answer.  Call placed to Hussain's mom (emergency contact) number no longer in service.  Letter sent to patient's home.

## 2025-05-08 ENCOUNTER — TELEPHONE (OUTPATIENT)
Dept: TRANSPLANT | Facility: CLINIC | Age: 30
End: 2025-05-08
Payer: COMMERCIAL

## 2025-05-08 DIAGNOSIS — Z94.0 KIDNEY REPLACED BY TRANSPLANT: Primary | ICD-10-CM

## 2025-05-08 DIAGNOSIS — Z48.298 AFTERCARE FOLLOWING ORGAN TRANSPLANT: ICD-10-CM

## 2025-05-08 RX ORDER — TACROLIMUS 0.75 MG/1
1.5 TABLET, EXTENDED RELEASE ORAL
Status: SHIPPED
Start: 2025-05-08

## 2025-05-08 NOTE — TELEPHONE ENCOUNTER
FREE DRUG APPLICATION APPROVED    Medication: ENVARSUS XR 0.75 MG PO TB24  Program Name:  Other (see comments)  Effective Date: 5/7/2025  Expiration Date: 5/5/2026  Pharmacy Filling the Rx:    Patient Notified: yes- called patient and left mesage  Additional Information: patient can call [phone] to setup delivery

## 2025-05-08 NOTE — TELEPHONE ENCOUNTER
ISSUE  Patient recently with no prescription insurance coverage.  Enrolled in Envarsus patient assistance.  Unsure if patient has been taking any immunosuppression for the past 2-3 months.        OUTCOME  Call placed to Hussain. VM left inquiring about immunosuppression, whether or not he has been taking it since he lost coverage, if he is still taking Prednisone 5 mg daily. Advised him to call [employer] [phone] ASAP to set up Envarsus delivery. Educated him on the importance of labs after starting Envarsus. Requested a call back to discuss.

## 2025-05-12 NOTE — TELEPHONE ENCOUNTER
Patient is aware that he has been approved through free drug. He has already placed his order and has their phone number for further refills

## 2025-06-16 ENCOUNTER — TELEPHONE (OUTPATIENT)
Dept: TRANSPLANT | Facility: CLINIC | Age: 30
End: 2025-06-16
Payer: COMMERCIAL

## 2025-06-16 DIAGNOSIS — Z48.298 AFTERCARE FOLLOWING ORGAN TRANSPLANT: Primary | ICD-10-CM

## 2025-06-16 NOTE — LETTER
PHYSICIAN ORDERS      DATE & TIME ISSUED: 2025 3:00 PM  PATIENT NAME: Hussain Steven   : 1995     [facility] MR# [if applicable]: [id number]     DIAGNOSIS:  kidney transplant/aftercare following organ transplant  ICD-10 CODE: Z94.0/Z48.298    Please collect the following labs weekly x 4 after starting Envarsus:   BMP   CBC   Tacrolimus level (24 hour trough level indicating time of last dose taken)    Any questions please call: [phone]    Please fax each result same day as resulted/available [phone]  Critical lab results page [phone] option 5          Wicho Fuentes M.D   of Medicine  Nephrology and Hypertension  Department of Medicine

## 2025-06-16 NOTE — TELEPHONE ENCOUNTER
ISSUE  Patient recently with no prescription insurance coverage.  Enrolled in [employer] patient assistance.  Unsure if patient has been taking any immunosuppression for the past 2-3 months.      PLAN  Confirm Hussain has started Envarsus 1.5 mg daily.   Confirm he is still taking Prednisone 5 mg daily? New prescription needed?  Advise labs ASAP if Envarsus has been started.       OUTCOME  Call placed to Hussain. Detailed VM left inquiring about current medications and advising labs. Requested a call back ASAP.  Lab orders sent to local lab.

## 2025-06-26 ENCOUNTER — LAB (OUTPATIENT)
Dept: LAB | Facility: CLINIC | Age: 30
End: 2025-06-26
Payer: COMMERCIAL

## 2025-06-26 DIAGNOSIS — Z48.298 AFTERCARE FOLLOWING ORGAN TRANSPLANT: Primary | ICD-10-CM

## 2025-06-26 PROCEDURE — 80169 DRUG ASSAY EVEROLIMUS: CPT | Performed by: INTERNAL MEDICINE

## 2025-06-30 ENCOUNTER — RESULTS FOLLOW-UP (OUTPATIENT)
Dept: MULTI SPECIALTY CLINIC | Facility: CLINIC | Age: 30
End: 2025-06-30

## 2025-07-02 ENCOUNTER — TELEPHONE (OUTPATIENT)
Dept: TRANSPLANT | Facility: CLINIC | Age: 30
End: 2025-07-02
Payer: COMMERCIAL

## 2025-07-02 DIAGNOSIS — D47.Z1 PTLD (POST-TRANSPLANT LYMPHOPROLIFERATIVE DISORDER) (H): ICD-10-CM

## 2025-07-02 NOTE — TELEPHONE ENCOUNTER
ISSUE  Creatinine elevated at 1.70.  Everolimus level run instead of Tac (Envarsus level). No longer on Everolimus.  Overdue for annual appointment.    Plan  Assess hydration status.  How much water is he drinking per day?  Any recent illness, diarrhea, s/s of a UTI, or medication changes?  Any increased intake of alcohol or caffeine?    Confirm he is taking Envarsus 1.5 mg daily in addition to Prednisone 5 mg daily.    Advise him to increase hydration and repeat labs in 1-2 weeks.  Orders have been sent.    LPN task;  Please call with above plan.  Thanks!

## 2025-07-02 NOTE — LETTER
PHYSICIAN ORDERS      DATE & TIME ISSUED: 2025 11:23 AM  PATIENT NAME: Hussain Steven   : 1995     [facility] MR# [if applicable]: [id number]     DIAGNOSIS:  kidney transplant/aftercare following organ transplant  ICD-10 CODE: Z94.0/Z48.298    UPDATED ORDERS, PLEASE DISREGARD OLD ORDERS    Please collect the following labs weekly x 4 after starting Envarsus:   BMP   CBC   Tacrolimus level (24 hour trough level indicating time of last dose taken)    Any questions please call: [phone]    Please fax each result same day as resulted/available [phone]  Critical lab results page [phone] option 5          Wicho Fuentes M.D   of Medicine  Nephrology and Hypertension  Department of Medicine

## 2025-07-03 NOTE — TELEPHONE ENCOUNTER
LEONIDES yesterday and today regarding lab results and recommendations. Front Flip message was sent and patient was asked to reply back to confirm or return a call to the transplant office.   Obdulia So LPN

## 2025-07-05 LAB — EVEROLIMUS BLD-MCNC: <2 NG/ML

## 2025-07-08 NOTE — TELEPHONE ENCOUNTER
LMTCB or respond to [employer] message with receipt of lab results and recommendations.  Obdulia So LPN

## 2025-07-13 ENCOUNTER — HEALTH MAINTENANCE LETTER (OUTPATIENT)
Age: 30
End: 2025-07-13

## 2025-08-05 ENCOUNTER — EXTERNAL ORDER RESULTS (OUTPATIENT)
Dept: LAB | Facility: CLINIC | Age: 30
End: 2025-08-05
Payer: COMMERCIAL

## 2025-08-05 PROCEDURE — 80197 ASSAY OF TACROLIMUS: CPT | Performed by: INTERNAL MEDICINE

## 2025-08-06 LAB
% BASOPHILS (EXTERNAL): 0.3 %
% EOSINOPHILS (EXTERNAL): 1.9 %
% IMMATURE GRANULOCYTES (EXTERNAL): 0.1 %
% LYMPHOCYTES (EXTERNAL): 29.9 %
% MONOCYTES (EXTERNAL): 6.9 %
% NEUTROPHILS (EXTERNAL): 60.9 %
ABSOLUTE BASOPHILS (EXTERNAL): 0 10*9/L (ref 0–0.1)
ABSOLUTE EOSINOPHILS (EXTERNAL): 0.2 10*9/L (ref 0–0.4)
ABSOLUTE IMMATURE GRANULOCYTES (EXTERNAL): 0.01 10*9/L (ref 0–0.06)
ABSOLUTE LYMPHOCYTES (EXTERNAL): 2.3 10*9/L (ref 0.8–3.3)
ABSOLUTE MONOCYTES (EXTERNAL): 0.5 10*9/L (ref 0.2–0.9)
ABSOLUTE NEUTROPHILS (EXTERNAL): 4.7 10*9/L (ref 1.5–7.6)

## 2025-08-09 ENCOUNTER — LAB (OUTPATIENT)
Dept: LAB | Facility: CLINIC | Age: 30
End: 2025-08-09
Payer: COMMERCIAL

## 2025-08-09 DIAGNOSIS — Z48.298 AFTERCARE FOLLOWING ORGAN TRANSPLANT: Primary | ICD-10-CM

## 2025-08-10 LAB
TACROLIMUS BLD-MCNC: 5.5 UG/L (ref 5–15)
TME LAST DOSE: NORMAL H
TME LAST DOSE: NORMAL H